# Patient Record
Sex: FEMALE | Race: WHITE | NOT HISPANIC OR LATINO | Employment: OTHER | URBAN - METROPOLITAN AREA
[De-identification: names, ages, dates, MRNs, and addresses within clinical notes are randomized per-mention and may not be internally consistent; named-entity substitution may affect disease eponyms.]

---

## 2017-02-21 RX ORDER — IPRATROPIUM BROMIDE AND ALBUTEROL SULFATE 2.5; .5 MG/3ML; MG/3ML
SOLUTION RESPIRATORY (INHALATION)
Qty: 180 ML | Refills: 0 | Status: SHIPPED | OUTPATIENT
Start: 2017-02-21 | End: 2018-03-20

## 2017-06-01 VITALS
DIASTOLIC BLOOD PRESSURE: 80 MMHG | WEIGHT: 268 LBS | HEART RATE: 98 BPM | BODY MASS INDEX: 45.75 KG/M2 | HEIGHT: 64 IN | OXYGEN SATURATION: 88 % | SYSTOLIC BLOOD PRESSURE: 130 MMHG

## 2017-06-01 RX ORDER — METFORMIN HYDROCHLORIDE 500 MG/1
500 TABLET ORAL
COMMUNITY
End: 2017-08-23 | Stop reason: SDUPTHER

## 2017-06-01 RX ORDER — BUDESONIDE AND FORMOTEROL FUMARATE DIHYDRATE 160; 4.5 UG/1; UG/1
2 AEROSOL RESPIRATORY (INHALATION) EVERY 12 HOURS
COMMUNITY
End: 2017-10-23

## 2017-06-01 RX ORDER — LISINOPRIL 2.5 MG/1
2.5 TABLET ORAL DAILY
COMMUNITY
End: 2017-09-22 | Stop reason: SDUPTHER

## 2017-06-01 RX ORDER — DILTIAZEM HYDROCHLORIDE 120 MG/1
120 CAPSULE, COATED, EXTENDED RELEASE ORAL DAILY
COMMUNITY
End: 2017-10-23 | Stop reason: SDUPTHER

## 2017-06-05 ENCOUNTER — OFFICE VISIT (OUTPATIENT)
Dept: FAMILY MEDICINE | Facility: CLINIC | Age: 67
End: 2017-06-05
Payer: MEDICARE

## 2017-06-05 VITALS
HEIGHT: 65 IN | BODY MASS INDEX: 42.99 KG/M2 | WEIGHT: 258 LBS | HEART RATE: 68 BPM | SYSTOLIC BLOOD PRESSURE: 120 MMHG | DIASTOLIC BLOOD PRESSURE: 68 MMHG | OXYGEN SATURATION: 98 %

## 2017-06-05 DIAGNOSIS — E11.9 TYPE 2 DIABETES MELLITUS WITHOUT COMPLICATION, WITHOUT LONG-TERM CURRENT USE OF INSULIN: ICD-10-CM

## 2017-06-05 DIAGNOSIS — I10 HYPERTENSION, ESSENTIAL: ICD-10-CM

## 2017-06-05 DIAGNOSIS — R73.09 HEMOGLOBIN A1C LESS THAN 7.0%: ICD-10-CM

## 2017-06-05 DIAGNOSIS — M1A.09X0 IDIOPATHIC CHRONIC GOUT OF MULTIPLE SITES WITHOUT TOPHUS: ICD-10-CM

## 2017-06-05 PROCEDURE — 99213 OFFICE O/P EST LOW 20 MIN: CPT | Mod: ,,, | Performed by: FAMILY MEDICINE

## 2017-06-05 RX ORDER — ALLOPURINOL 100 MG/1
100 TABLET ORAL DAILY
Qty: 90 TABLET | Refills: 1 | Status: SHIPPED | OUTPATIENT
Start: 2017-06-05 | End: 2017-10-23 | Stop reason: SDUPTHER

## 2017-06-05 NOTE — PROGRESS NOTES
Subjective:       Patient ID: Johanna Mancini is a 67 y.o. female.    Chief Complaint: Hypertension (rx refills) and Diabetes    Hypertension   This is a chronic problem. The current episode started more than 1 year ago. The problem is unchanged. The problem is controlled. Pertinent negatives include no anxiety, blurred vision, chest pain, headaches or shortness of breath. Risk factors for coronary artery disease include dyslipidemia, obesity, post-menopausal state and sedentary lifestyle. Past treatments include ACE inhibitors. There are no compliance problems.    Diabetes   Pertinent negatives for hypoglycemia include no dizziness or headaches. Pertinent negatives for diabetes include no blurred vision, no chest pain, no fatigue and no weakness.     Review of Systems   Constitutional: Negative for fatigue, fever and unexpected weight change.   HENT: Negative for congestion, hearing loss and sore throat.    Eyes: Negative for blurred vision and visual disturbance.   Respiratory: Negative for cough, chest tightness and shortness of breath.    Cardiovascular: Negative for chest pain and leg swelling.   Gastrointestinal: Negative for abdominal pain, constipation, diarrhea, nausea and vomiting.   Genitourinary: Negative for difficulty urinating.   Musculoskeletal: Negative for back pain.   Neurological: Negative for dizziness, weakness and headaches.   Hematological: Negative for adenopathy.   Psychiatric/Behavioral: Negative for suicidal ideas.       Past Medical History:   Diagnosis Date    Articular gout     COPD (chronic obstructive pulmonary disease)     Hypertension     Osteopetrosis     Sleep apnea       Past Surgical History:   Procedure Laterality Date    CHOLECYSTECTOMY      HYSTERECTOMY      MINERVA equivalent       Family History   Problem Relation Age of Onset    Heart disease Mother     Cancer Father     Cancer Sister      breast       Social History     Social History    Marital status:       Spouse name: N/A    Number of children: N/A    Years of education: N/A     Social History Main Topics    Smoking status: Former Smoker    Smokeless tobacco: None    Alcohol use No    Drug use: No    Sexual activity: Not Asked     Other Topics Concern    None     Social History Narrative    .  Three children. Former smoker, quit 15 yr ago.  Retired , cook and kitchen work.        Current Outpatient Prescriptions   Medication Sig Dispense Refill    albuterol-ipratropium 2.5mg-0.5mg/3mL (DUO-NEB) 0.5 mg-3 mg(2.5 mg base)/3 mL nebulizer solution 1 VIAL THRU NEBULZIER EVERY 6 TO 8 HOURS AS NEEDED 180 mL 0    allopurinol (ZYLOPRIM) 100 MG tablet Take 1 tablet (100 mg total) by mouth once daily. 90 tablet 1    budesonide-formoterol 160-4.5 mcg (SYMBICORT) 160-4.5 mcg/actuation HFAA Inhale 2 puffs into the lungs every 12 (twelve) hours. Controller      cloNIDine (CATAPRES) 0.1 MG tablet TAKE 1 TABLET BY MOUTH TWICE DAILY 60 tablet 5    diltiaZEM (CARDIZEM CD) 120 MG Cp24 Take 120 mg by mouth once daily.      hydrochlorothiazide (HYDRODIURIL) 25 MG tablet TAKE 1 TABLET BY MOUTH DAILY 30 tablet 5    metformin (GLUCOPHAGE) 500 MG tablet Take 500 mg by mouth daily with breakfast.      theophylline (THEODUR) 300 MG 12 hr tablet TAKE 1 TABLET BY MOUTH TWICE DAILY 60 tablet 5    atenolol (TENORMIN) 25 MG tablet TAKE ONE TABLET BY MOUTH EVERY DAY 90 tablet 3    ibuprofen (ADVIL,MOTRIN) 200 MG tablet Take 200 mg by mouth every 6 (six) hours as needed.      lisinopril (PRINIVIL,ZESTRIL) 2.5 MG tablet Take 2.5 mg by mouth once daily.      tramadol (ULTRAM) 50 mg tablet Take 50 mg by mouth every 6 (six) hours as needed.       No current facility-administered medications for this visit.        Review of patient's allergies indicates:   Allergen Reactions    Tylenol [acetaminophen] Anaphylaxis    Levofloxacin Itching     Objective:    HPI     Hypertension    Additional comments: rx refills       Last  "edited by Miguel Ángel Doty MA on 6/5/2017  1:36 PM. (History)      Blood pressure 120/68, pulse 68, height 5' 5" (1.651 m), weight 117 kg (258 lb), SpO2 98 %. Body mass index is 42.93 kg/m².   Physical Exam   Constitutional: Vital signs are normal. She appears well-developed and well-nourished. She is cooperative. No distress.   HENT:   Head: Normocephalic and atraumatic.   Right Ear: Tympanic membrane normal.   Left Ear: Tympanic membrane normal.   Eyes: Conjunctivae, EOM and lids are normal. Lids are everted and swept, no foreign bodies found. Right pupil is round and reactive. Left pupil is round and reactive.   Neck: Trachea normal. Neck supple.   Cardiovascular: Regular rhythm, S1 normal and S2 normal.    Pulmonary/Chest: Breath sounds normal. No respiratory distress. She has no wheezes. She has no rales.   Abdominal: Soft. Bowel sounds are normal. She exhibits no mass. There is no tenderness. There is no rigidity and no guarding.   Musculoskeletal: Normal range of motion.   Lymphadenopathy:     She has no cervical adenopathy.     She has no axillary adenopathy.   Neurological: She is alert.   Skin: Skin is warm and dry.   Psychiatric: She has a normal mood and affect. Her behavior is normal. Judgment and thought content normal.           Assessment:       1. Hypertension, essential    2. Type 2 diabetes mellitus without complication, without long-term current use of insulin    3. Hemoglobin A1c less than 7.0%    4. Idiopathic chronic gout of multiple sites without tophus        Plan:       Johanna was seen today for hypertension and diabetes.    Diagnoses and all orders for this visit:    Hypertension, essential    Type 2 diabetes mellitus without complication, without long-term current use of insulin    Hemoglobin A1c less than 7.0%    Idiopathic chronic gout of multiple sites without tophus  -     allopurinol (ZYLOPRIM) 100 MG tablet; Take 1 tablet (100 mg total) by mouth once daily.         "

## 2017-06-05 NOTE — PATIENT INSTRUCTIONS
Diabetes and Heart Disease     Take your medicines as directed each day, even if you feel fine.   If you have diabetes, you are two to four times more likely to have heart disease than someone without diabetes. This higher risk is due to diabetes, but it is also due to other risk factors for heart disease that happen in people with diabetes. But theres good news. You can help control your health risks by making some changes in your life. You can take steps to reduce your risk of heart disease by half--similar to the risk in people who don't have diabetes.  Your main risk factors  Three major risk factors for heart disease are high blood sugar, high blood pressure, and high levels of lipids. By keeping risk factors under control, you can help keep your heart and arteries healthy. This may reduce your chances of a heart attack.  · Blood sugar. High blood sugar can make artery walls tough and rough. Plaque (waxy material in the blood) can then build up along the artery walls, making it harder for blood to flow through the arteries. Having high blood sugar increases the chances of having high blood pressure and high cholesterol.  · Blood pressure. When blood pressure is high all the time it causes your heart to work harder to pump blood. Artery walls become damaged. This increases the risk for plaque build up.  · Lipids. The body needs some lipids in the blood to stay healthy. But lipid levels that are too high can damage the artery walls. Lipids include cholesterol and triglycerides. There are two kinds of cholesterol. LDL (bad) cholesterol can damage the arteries. But HDL (good) cholesterol helps clear LDL cholesterol from the blood vessels. This helps keep the arteries healthy. When blood sugar is high, the level of triglycerides in the blood may also be high. High blood triglyceride levels can cause plaque to form.   Other risk factors  Certain lifestyle factors can increase levels of your blood sugar, blood  pressure, and lipids. Such increases raise your risk of heart disease:  · Smoking damages the lining of your arteries. This allows plaque to build up in the artery walls. Smoking also constricts (narrows) the arteries. This can raise blood pressure and cause chest pain or angina. Smoking also increases your risk of getting type 2 diabetes.  · Not being active makes it harder for your heart to do its work. Inactivity is linked to many other risk factors, such as high blood pressure and poor cholesterol levels. Inactivity also increases your risk of getting type 2 diabetes.  · Being overweight makes it harder for your body to use insulin. It also makes your heart work too hard. Being overweight is also the main contributor to the development of type 2 diabetes,   Changes you can make  Following a few simple steps can help keep your risk factors under control. Work with your healthcare team to reach your goals.  · Quitting smoking could save your life. Smoking damages the lining of the blood vessels and raises blood pressure. Smoking also affects how your body uses insulin. This makes it harder to keep blood sugar under control. If you smoke and need help quitting, talk to your healthcare team.   · Testing your blood sugar is the only way to know whether it is under control. Be sure to test your blood sugar yourself. Also get your blood tested in the lab, as directed.  · Monitoring your blood pressure and lipid levels can help you achieve safe levels. Visit your healthcare team as scheduled.  · Taking medicines as directed can help control blood sugar, blood pressure, blood clotting, and/or cholesterol levels.  · Eating right can reduce your risk factors and help you lose weight. Try to limit the amount of processed or refined carbohydrates you eat at one time. Cut back on your total calorie intake. Eat foods low in saturated fat and cholesterol. Eat fiber, including vegetables and whole grains, and cut down on salt. A  dietitian or diabetes educator can help form a meal plan that works for you--even if you are on a low budget.   · Being active can help reduce your weight, strengthen your heart, and lower your lipid levels and blood pressure. Exercise and activity are good for your whole body. Talk to your healthcare team about increasing your activity safely over time.  · Keeping your appointments with your healthcare provider helps you stay healthy. Go in for checkups and lab tests as scheduled.  Date Last Reviewed: 5/19/2016  © 0981-5013 [employer]. [address] [place] [address], [place] [address]. All rights reserved. This information is not intended as a substitute for professional medical care. Always follow your healthcare professional's instructions.

## 2017-08-23 DIAGNOSIS — E11.9 TYPE 2 DIABETES MELLITUS WITHOUT COMPLICATION, WITH LONG-TERM CURRENT USE OF INSULIN: Primary | ICD-10-CM

## 2017-08-23 DIAGNOSIS — Z79.4 TYPE 2 DIABETES MELLITUS WITHOUT COMPLICATION, WITH LONG-TERM CURRENT USE OF INSULIN: Primary | ICD-10-CM

## 2017-08-24 RX ORDER — METFORMIN HYDROCHLORIDE 500 MG/1
500 TABLET ORAL
Qty: 30 TABLET | Refills: 2 | Status: SHIPPED | OUTPATIENT
Start: 2017-08-24 | End: 2017-09-22 | Stop reason: SDUPTHER

## 2017-09-20 ENCOUNTER — TELEPHONE (OUTPATIENT)
Dept: FAMILY MEDICINE | Facility: CLINIC | Age: 67
End: 2017-09-20

## 2017-09-20 DIAGNOSIS — Z79.4 TYPE 2 DIABETES MELLITUS WITHOUT COMPLICATION, WITH LONG-TERM CURRENT USE OF INSULIN: Primary | ICD-10-CM

## 2017-09-20 DIAGNOSIS — E11.9 TYPE 2 DIABETES MELLITUS WITHOUT COMPLICATION, WITH LONG-TERM CURRENT USE OF INSULIN: Primary | ICD-10-CM

## 2017-09-20 NOTE — TELEPHONE ENCOUNTER
----- Message from Neelima Meneses sent at 9/19/2017  2:47 PM CDT -----  Patient needs diabetic nephropathy screening.

## 2017-09-22 DIAGNOSIS — Z79.4 TYPE 2 DIABETES MELLITUS WITHOUT COMPLICATION, WITH LONG-TERM CURRENT USE OF INSULIN: ICD-10-CM

## 2017-09-22 DIAGNOSIS — E11.9 TYPE 2 DIABETES MELLITUS WITHOUT COMPLICATION, WITH LONG-TERM CURRENT USE OF INSULIN: ICD-10-CM

## 2017-09-22 RX ORDER — LISINOPRIL 2.5 MG/1
2.5 TABLET ORAL DAILY
Qty: 30 TABLET | Refills: 1 | Status: SHIPPED | OUTPATIENT
Start: 2017-09-22 | End: 2017-10-23 | Stop reason: SDUPTHER

## 2017-09-22 RX ORDER — METFORMIN HYDROCHLORIDE 500 MG/1
500 TABLET ORAL
Qty: 30 TABLET | Refills: 1 | Status: SHIPPED | OUTPATIENT
Start: 2017-09-22 | End: 2017-10-23 | Stop reason: SDUPTHER

## 2017-10-16 LAB
ALBUMIN SERPL-MCNC: 3.8 G/DL (ref 3.1–4.7)
ALP SERPL-CCNC: 70 IU/L (ref 40–104)
ALT (SGPT): 16 IU/L (ref 3–33)
AST SERPL-CCNC: 19 IU/L (ref 10–40)
BILIRUB SERPL-MCNC: 0.7 MG/DL (ref 0.3–1)
BUN SERPL-MCNC: 15 MG/DL (ref 8–20)
CALCIUM SERPL-MCNC: 9.3 MG/DL (ref 7.7–10.4)
CHLORIDE: 98 MMOL/L (ref 98–110)
CO2 SERPL-SCNC: 33.5 MMOL/L (ref 22.8–31.6)
CREATININE RANDOM URINE: 86 MG/DL
CREATININE: 0.65 MG/DL (ref 0.6–1.4)
GLUCOSE: 111 MG/DL (ref 70–99)
MICROALBUM.,U,RANDOM: 2.4 MCG/ML (ref 0–19.9)
MICROALBUMIN/CREATININE RATIO: 3 (ref 0–30)
POTASSIUM SERPL-SCNC: 4.3 MMOL/L (ref 3.5–5)
PROT SERPL-MCNC: 6.9 G/DL (ref 6–8.2)
SODIUM: 142 MMOL/L (ref 134–144)

## 2017-10-17 LAB — HBA1C MFR BLD: 6.2 % (ref 3.1–6.5)

## 2017-10-23 ENCOUNTER — OFFICE VISIT (OUTPATIENT)
Dept: FAMILY MEDICINE | Facility: CLINIC | Age: 67
End: 2017-10-23
Payer: MEDICARE

## 2017-10-23 VITALS
HEIGHT: 65 IN | SYSTOLIC BLOOD PRESSURE: 110 MMHG | OXYGEN SATURATION: 91 % | BODY MASS INDEX: 43.65 KG/M2 | DIASTOLIC BLOOD PRESSURE: 60 MMHG | HEART RATE: 94 BPM | WEIGHT: 262 LBS

## 2017-10-23 DIAGNOSIS — E11.9 TYPE 2 DIABETES MELLITUS WITHOUT COMPLICATION, WITHOUT LONG-TERM CURRENT USE OF INSULIN: Primary | ICD-10-CM

## 2017-10-23 DIAGNOSIS — M1A.09X0 IDIOPATHIC CHRONIC GOUT OF MULTIPLE SITES WITHOUT TOPHUS: ICD-10-CM

## 2017-10-23 DIAGNOSIS — Z79.4 TYPE 2 DIABETES MELLITUS WITHOUT COMPLICATION, WITH LONG-TERM CURRENT USE OF INSULIN: ICD-10-CM

## 2017-10-23 DIAGNOSIS — J43.1 PANLOBULAR EMPHYSEMA: ICD-10-CM

## 2017-10-23 DIAGNOSIS — M10.09 IDIOPATHIC GOUT OF MULTIPLE SITES, UNSPECIFIED CHRONICITY: ICD-10-CM

## 2017-10-23 DIAGNOSIS — I10 HYPERTENSION, ESSENTIAL: ICD-10-CM

## 2017-10-23 DIAGNOSIS — Z23 NEED FOR INFLUENZA VACCINATION: ICD-10-CM

## 2017-10-23 DIAGNOSIS — E11.9 TYPE 2 DIABETES MELLITUS WITHOUT COMPLICATION, WITH LONG-TERM CURRENT USE OF INSULIN: ICD-10-CM

## 2017-10-23 PROCEDURE — G0008 ADMIN INFLUENZA VIRUS VAC: HCPCS | Mod: ,,, | Performed by: FAMILY MEDICINE

## 2017-10-23 PROCEDURE — 99214 OFFICE O/P EST MOD 30 MIN: CPT | Mod: ,,, | Performed by: FAMILY MEDICINE

## 2017-10-23 PROCEDURE — 90662 IIV NO PRSV INCREASED AG IM: CPT | Mod: ,,, | Performed by: FAMILY MEDICINE

## 2017-10-23 RX ORDER — ALLOPURINOL 100 MG/1
100 TABLET ORAL DAILY
Qty: 90 TABLET | Refills: 1 | Status: SHIPPED | OUTPATIENT
Start: 2017-10-23 | End: 2018-03-20 | Stop reason: SDUPTHER

## 2017-10-23 RX ORDER — IBUPROFEN 200 MG
200 TABLET ORAL EVERY 6 HOURS PRN
COMMUNITY
Start: 2017-10-23

## 2017-10-23 RX ORDER — HYDROCHLOROTHIAZIDE 25 MG/1
25 TABLET ORAL DAILY
Qty: 30 TABLET | Refills: 5 | Status: SHIPPED | OUTPATIENT
Start: 2017-10-23 | End: 2018-03-20

## 2017-10-23 RX ORDER — DILTIAZEM HYDROCHLORIDE 120 MG/1
120 CAPSULE, COATED, EXTENDED RELEASE ORAL DAILY
Qty: 30 CAPSULE | Refills: 6 | Status: SHIPPED | OUTPATIENT
Start: 2017-10-23 | End: 2018-03-20

## 2017-10-23 RX ORDER — CLONIDINE HYDROCHLORIDE 0.1 MG/1
0.1 TABLET ORAL 2 TIMES DAILY
Qty: 60 TABLET | Refills: 5 | Status: SHIPPED | OUTPATIENT
Start: 2017-10-23 | End: 2018-03-20

## 2017-10-23 RX ORDER — METFORMIN HYDROCHLORIDE 500 MG/1
500 TABLET ORAL
Qty: 30 TABLET | Refills: 6 | Status: SHIPPED | OUTPATIENT
Start: 2017-10-23 | End: 2018-03-20

## 2017-10-23 RX ORDER — THEOPHYLLINE 300 MG/1
300 TABLET, EXTENDED RELEASE ORAL 2 TIMES DAILY
Qty: 60 TABLET | Refills: 5 | Status: SHIPPED | OUTPATIENT
Start: 2017-10-23 | End: 2018-03-20

## 2017-10-23 RX ORDER — LISINOPRIL 2.5 MG/1
2.5 TABLET ORAL DAILY
Qty: 30 TABLET | Refills: 1 | Status: SHIPPED | OUTPATIENT
Start: 2017-10-23 | End: 2017-12-26 | Stop reason: SDUPTHER

## 2017-10-23 NOTE — PATIENT INSTRUCTIONS
Chronic Lung Disease: Managing Sleep Problems  Chronic lung diseases include chronic obstructive pulmonary disease (COPD), most often chronic bronchitis and emphysema. They also include pulmonary fibrosis or sarcoidosis. If you have a chronic lung disease, you may have trouble sleeping. You may wake up often at night. Or you may not feel rested in the morning. There are many reasons you may not be getting a good nights sleep. Lung disease can make it harder to breathe at night. Age, certain medicines, and not getting enough activity during the day can also affect sleep.    Sleeping better  If youre having trouble sleeping, try the following:  · Use a breathing technique. Taking slow, deep breaths can help you relax and fall asleep.  ¨ Ask your healthcare provider to show you how to do pursed-lip and diaphragmatic breathing in bed. Both of these breathing methods are good for people with lung disease.  · Dont have drinks with caffeine in the afternoon or evening.   · Try to go to sleep and wake up at around the same time every day. This helps your body get into a pattern.  · Avoid long naps during the day. This can make it harder to sleep at night. A very brief nap should be OK.  · Make sure your bed and bedroom are comfortable for you. This includes temperature, light, and noise level.  · It may be best not to watch television or use your computer or phone in bed.  · Talk to your healthcare provider about  any medicines you take at bedtime, they may be keeping you awake. You may be able to take the medicine at another time of day.  CPAP and BiPAP  To help with breathing at night, your healthcare provider may prescribe CPAP or BiPAP. You may be given a CPAP (continuous positive airway pressure) device. Or you may be given a BiPAP (bilevel positive airway pressure) device. These devices send a gentle flow of air through a nasal mask while you sleep. This air goes through your nose and into your lungs, keeping  your airways open. Below are tips for using these devices:  · Give yourself time to adjust to the device. It may take a while. You can ask your provider or someone from the medical supply company for suggestions to make it more comfortable.  · If your mask doesnt fit or feel right, talk to your provider or the medical supply company representative about adjusting it. Or you may try a different mask. Custom-made masks are also available.  · These devices work best if your nose is clear. If you have allergies or other problems that block your nose, talk to your provider.  Date Last Reviewed: 5/1/2016  © 6994-2431 [employer]. [address] [place] [address], [place] [address]. All rights reserved. This information is not intended as a substitute for professional medical care. Always follow your healthcare professional's instructions.

## 2017-10-23 NOTE — PROGRESS NOTES
Subjective:       Patient ID: Johanna Mancini is a 67 y.o. female.    Chief Complaint: Diabetes (discuss lab results) and Medication Refill (b/p, diabetes, )    Diabetes   She presents for her follow-up diabetic visit. She has type 2 diabetes mellitus. No MedicAlert identification noted. Her disease course has been improving. Pertinent negatives for hypoglycemia include no confusion, dizziness or headaches. Pertinent negatives for diabetes include no blurred vision, no chest pain, no fatigue, no foot paresthesias, no foot ulcerations, no polydipsia, no polyphagia and no weakness. Symptoms are stable.   Medication Refill   Pertinent negatives include no abdominal pain, arthralgias, chest pain, chills, congestion, coughing, diaphoresis, fatigue, fever, headaches, myalgias, nausea, rash, sore throat, vomiting or weakness.     Review of Systems   Constitutional: Negative for appetite change, chills, diaphoresis, fatigue and fever.   HENT: Negative for congestion, ear pain, hearing loss, sore throat and trouble swallowing.    Eyes: Negative for blurred vision, photophobia, pain and visual disturbance.   Respiratory: Positive for shortness of breath (sees monique). Negative for cough and chest tightness.    Cardiovascular: Negative for chest pain, palpitations and leg swelling.   Gastrointestinal: Negative for abdominal pain, blood in stool, constipation, diarrhea, nausea and vomiting.   Endocrine: Negative for cold intolerance, heat intolerance, polydipsia and polyphagia.   Genitourinary: Negative for difficulty urinating, flank pain, pelvic pain and vaginal pain.   Musculoskeletal: Negative for arthralgias and myalgias.   Skin: Negative for rash.   Allergic/Immunologic: Negative for immunocompromised state.   Neurological: Negative for dizziness, weakness, light-headedness and headaches.   Hematological: Negative for adenopathy. Does not bruise/bleed easily.   Psychiatric/Behavioral: Negative for confusion, self-injury  and suicidal ideas.       Past Medical History:   Diagnosis Date    Articular gout     CHF (congestive heart failure)     COPD (chronic obstructive pulmonary disease)     Diabetes mellitus, type 2     Hypertension     Osteopetrosis     Sleep apnea       Past Surgical History:   Procedure Laterality Date    CHOLECYSTECTOMY      HYSTERECTOMY      MINERVA equivalent       Family History   Problem Relation Age of Onset    Heart disease Mother     Cancer Father     Cancer Sister      breast       Social History     Social History    Marital status:      Spouse name: N/A    Number of children: N/A    Years of education: N/A     Social History Main Topics    Smoking status: Former Smoker    Smokeless tobacco: Never Used    Alcohol use No    Drug use: No    Sexual activity: Not Asked     Other Topics Concern    None     Social History Narrative    .  Three children. Former smoker, quit 15 yr ago.  Retired , cook and kitchen work.        Current Outpatient Prescriptions   Medication Sig Dispense Refill    albuterol-ipratropium 2.5mg-0.5mg/3mL (DUO-NEB) 0.5 mg-3 mg(2.5 mg base)/3 mL nebulizer solution 1 VIAL THRU NEBULZIER EVERY 6 TO 8 HOURS AS NEEDED 180 mL 0    allopurinol (ZYLOPRIM) 100 MG tablet Take 1 tablet (100 mg total) by mouth once daily. 90 tablet 1    cloNIDine (CATAPRES) 0.1 MG tablet Take 1 tablet (0.1 mg total) by mouth 2 (two) times daily. 60 tablet 5    diltiaZEM (CARDIZEM CD) 120 MG Cp24 Take 1 capsule (120 mg total) by mouth once daily. 30 capsule 6    hydrochlorothiazide (HYDRODIURIL) 25 MG tablet Take 1 tablet (25 mg total) by mouth once daily. 30 tablet 5    ibuprofen (ADVIL,MOTRIN) 200 MG tablet Take 1 tablet (200 mg total) by mouth every 6 (six) hours as needed.      lisinopril (PRINIVIL,ZESTRIL) 2.5 MG tablet Take 1 tablet (2.5 mg total) by mouth once daily. 30 tablet 1    metformin (GLUCOPHAGE) 500 MG tablet Take 1 tablet (500 mg total) by mouth daily with  "breakfast. 30 tablet 6    theophylline (THEODUR) 300 MG 12 hr tablet Take 1 tablet (300 mg total) by mouth 2 (two) times daily. 60 tablet 5     No current facility-administered medications for this visit.        Review of patient's allergies indicates:   Allergen Reactions    Tylenol [acetaminophen] Anaphylaxis    Levofloxacin Itching     Objective:    HPI     Diabetes    Additional comments: discuss lab results           Medication Refill    Additional comments: b/p, diabetes,        Last edited by Breanne Welsh MA on 10/23/2017  9:39 AM. (History)      Blood pressure 110/60, pulse 94, height 5' 5" (1.651 m), weight 118.8 kg (262 lb), SpO2 (!) 91 %. Body mass index is 43.6 kg/m².   Physical Exam   Constitutional: She is oriented to person, place, and time. She appears well-developed and well-nourished. She is cooperative. No distress.   obese   HENT:   Head: Normocephalic and atraumatic.   Right Ear: Tympanic membrane normal.   Left Ear: Tympanic membrane normal.   Eyes: Conjunctivae, EOM and lids are normal. Pupils are equal, round, and reactive to light. Lids are everted and swept, no foreign bodies found. Right pupil is round and reactive. Left pupil is round and reactive.   Neck: Trachea normal and normal range of motion. Neck supple.   Cardiovascular: Normal rate, regular rhythm, S1 normal, S2 normal, normal heart sounds and intact distal pulses.    Pulmonary/Chest: No respiratory distress. She has no wheezes. She has rales (mild, using o2).   Abdominal: Soft. Bowel sounds are normal. There is no rigidity and no guarding.   Musculoskeletal: Normal range of motion.   Lymphadenopathy:     She has no cervical adenopathy.     She has no axillary adenopathy.   Neurological: She is alert and oriented to person, place, and time.   Skin: Skin is warm and dry. Capillary refill takes less than 2 seconds.   Psychiatric: She has a normal mood and affect. Her behavior is normal. Judgment and thought content normal. "   Nursing note and vitals reviewed.          Assessment:       1. Type 2 diabetes mellitus without complication, without long-term current use of insulin    2. Hypertension, essential    3. Panlobular emphysema    4. Idiopathic gout of multiple sites, unspecified chronicity    5. Need for influenza vaccination    6. Idiopathic chronic gout of multiple sites without tophus    7. Type 2 diabetes mellitus without complication, with long-term current use of insulin        Plan:       Johanna was seen today for diabetes and medication refill.    Diagnoses and all orders for this visit:    Type 2 diabetes mellitus without complication, without long-term current use of insulin  -     metformin (GLUCOPHAGE) 500 MG tablet; Take 1 tablet (500 mg total) by mouth daily with breakfast.    Hypertension, essential  -     cloNIDine (CATAPRES) 0.1 MG tablet; Take 1 tablet (0.1 mg total) by mouth 2 (two) times daily.  -     diltiaZEM (CARDIZEM CD) 120 MG Cp24; Take 1 capsule (120 mg total) by mouth once daily.  -     hydrochlorothiazide (HYDRODIURIL) 25 MG tablet; Take 1 tablet (25 mg total) by mouth once daily.  -     lisinopril (PRINIVIL,ZESTRIL) 2.5 MG tablet; Take 1 tablet (2.5 mg total) by mouth once daily.    Panlobular emphysema  -     theophylline (THEODUR) 300 MG 12 hr tablet; Take 1 tablet (300 mg total) by mouth 2 (two) times daily.    Idiopathic gout of multiple sites, unspecified chronicity    Need for influenza vaccination  -     Influenza - High Dose (65+) (PF) (IM)    Idiopathic chronic gout of multiple sites without tophus  -     allopurinol (ZYLOPRIM) 100 MG tablet; Take 1 tablet (100 mg total) by mouth once daily.  -     ibuprofen (ADVIL,MOTRIN) 200 MG tablet; Take 1 tablet (200 mg total) by mouth every 6 (six) hours as needed.    Type 2 diabetes mellitus without complication, with long-term current use of insulin  -     lisinopril (PRINIVIL,ZESTRIL) 2.5 MG tablet; Take 1 tablet (2.5 mg total) by mouth once  daily.  -     metformin (GLUCOPHAGE) 500 MG tablet; Take 1 tablet (500 mg total) by mouth daily with breakfast.

## 2017-12-11 ENCOUNTER — TELEPHONE (OUTPATIENT)
Dept: INTERNAL MEDICINE | Facility: CLINIC | Age: 67
End: 2017-12-11

## 2017-12-11 NOTE — TELEPHONE ENCOUNTER
----- Message from Neelima Meneses sent at 12/6/2017  3:17 PM CST -----  It's reported the patient had a mammo last year I believe. Can you find out where and get result? I need this before year end to send to [facility]. Thanks

## 2017-12-26 DIAGNOSIS — I10 HYPERTENSION, ESSENTIAL: ICD-10-CM

## 2017-12-26 DIAGNOSIS — Z79.4 TYPE 2 DIABETES MELLITUS WITHOUT COMPLICATION, WITH LONG-TERM CURRENT USE OF INSULIN: ICD-10-CM

## 2017-12-26 DIAGNOSIS — E11.9 TYPE 2 DIABETES MELLITUS WITHOUT COMPLICATION, WITH LONG-TERM CURRENT USE OF INSULIN: ICD-10-CM

## 2017-12-27 RX ORDER — LISINOPRIL 2.5 MG/1
TABLET ORAL
Qty: 30 TABLET | Refills: 0 | Status: SHIPPED | OUTPATIENT
Start: 2017-12-27 | End: 2018-03-20

## 2018-01-24 DIAGNOSIS — I10 HYPERTENSION, ESSENTIAL: ICD-10-CM

## 2018-01-24 DIAGNOSIS — Z79.4 TYPE 2 DIABETES MELLITUS WITHOUT COMPLICATION, WITH LONG-TERM CURRENT USE OF INSULIN: ICD-10-CM

## 2018-01-24 DIAGNOSIS — E11.9 TYPE 2 DIABETES MELLITUS WITHOUT COMPLICATION, WITH LONG-TERM CURRENT USE OF INSULIN: ICD-10-CM

## 2018-01-24 RX ORDER — LISINOPRIL 2.5 MG/1
TABLET ORAL
Qty: 30 TABLET | Refills: 0 | Status: SHIPPED | OUTPATIENT
Start: 2018-01-24 | End: 2018-02-21 | Stop reason: SDUPTHER

## 2018-01-25 ENCOUNTER — HOSPITAL ENCOUNTER (EMERGENCY)
Facility: HOSPITAL | Age: 68
Discharge: HOME | End: 2018-01-25
Attending: EMERGENCY MEDICINE
Payer: MEDICARE

## 2018-01-25 VITALS
SYSTOLIC BLOOD PRESSURE: 105 MMHG | HEIGHT: 61 IN | HEART RATE: 102 BPM | OXYGEN SATURATION: 95 % | TEMPERATURE: 98 F | DIASTOLIC BLOOD PRESSURE: 58 MMHG | WEIGHT: 260 LBS | RESPIRATION RATE: 21 BRPM | BODY MASS INDEX: 49.09 KG/M2

## 2018-01-25 DIAGNOSIS — R05.9 COUGH: ICD-10-CM

## 2018-01-25 DIAGNOSIS — J44.9 CHRONIC OBSTRUCTIVE PULMONARY DISEASE, UNSPECIFIED COPD TYPE: Primary | ICD-10-CM

## 2018-01-25 DIAGNOSIS — R06.02 SOB (SHORTNESS OF BREATH): ICD-10-CM

## 2018-01-25 LAB
ALBUMIN SERPL BCP-MCNC: 3.8 G/DL
ALP SERPL-CCNC: 93 U/L
ALT SERPL W/O P-5'-P-CCNC: 18 U/L
ANION GAP SERPL CALC-SCNC: 14 MMOL/L
AST SERPL-CCNC: 19 U/L
BASOPHILS NFR BLD: 0 %
BILIRUB SERPL-MCNC: 0.2 MG/DL
BNP SERPL-MCNC: 27 PG/ML
BUN SERPL-MCNC: 18 MG/DL
CALCIUM SERPL-MCNC: 9.9 MG/DL
CHLORIDE SERPL-SCNC: 97 MMOL/L
CO2 SERPL-SCNC: 30 MMOL/L
CREAT SERPL-MCNC: 0.8 MG/DL
DIFFERENTIAL METHOD: ABNORMAL
EOSINOPHIL NFR BLD: 3 %
ERYTHROCYTE [DISTWIDTH] IN BLOOD BY AUTOMATED COUNT: 13.7 %
EST. GFR  (AFRICAN AMERICAN): >60 ML/MIN/1.73 M^2
EST. GFR  (NON AFRICAN AMERICAN): >60 ML/MIN/1.73 M^2
FLUAV AG SPEC QL IA: NEGATIVE
FLUBV AG SPEC QL IA: NEGATIVE
GLUCOSE SERPL-MCNC: 103 MG/DL
HCT VFR BLD AUTO: 43.3 %
HGB BLD-MCNC: 13.7 G/DL
LYMPHOCYTES NFR BLD: 40 %
MCH RBC QN AUTO: 28.2 PG
MCHC RBC AUTO-ENTMCNC: 31.6 G/DL
MCV RBC AUTO: 89 FL
MONOCYTES NFR BLD: 7 %
NEUTROPHILS NFR BLD: 44 %
NEUTS BAND NFR BLD MANUAL: 6 %
PLATELET # BLD AUTO: 174 K/UL
PMV BLD AUTO: 12.2 FL
POTASSIUM SERPL-SCNC: 3.8 MMOL/L
PROT SERPL-MCNC: 7.5 G/DL
RBC # BLD AUTO: 4.86 M/UL
SODIUM SERPL-SCNC: 141 MMOL/L
SPECIMEN SOURCE: NORMAL
TROPONIN I SERPL DL<=0.01 NG/ML-MCNC: <0.006 NG/ML
WBC # BLD AUTO: 5.85 K/UL

## 2018-01-25 PROCEDURE — 93010 ELECTROCARDIOGRAM REPORT: CPT | Mod: ,,, | Performed by: INTERNAL MEDICINE

## 2018-01-25 PROCEDURE — 83880 ASSAY OF NATRIURETIC PEPTIDE: CPT

## 2018-01-25 PROCEDURE — 85025 COMPLETE CBC W/AUTO DIFF WBC: CPT

## 2018-01-25 PROCEDURE — 80053 COMPREHEN METABOLIC PANEL: CPT

## 2018-01-25 PROCEDURE — 94640 AIRWAY INHALATION TREATMENT: CPT

## 2018-01-25 PROCEDURE — 63600175 PHARM REV CODE 636 W HCPCS: Performed by: EMERGENCY MEDICINE

## 2018-01-25 PROCEDURE — 27000221 HC OXYGEN, UP TO 24 HOURS

## 2018-01-25 PROCEDURE — 87400 INFLUENZA A/B EACH AG IA: CPT

## 2018-01-25 PROCEDURE — 99285 EMERGENCY DEPT VISIT HI MDM: CPT | Mod: 25

## 2018-01-25 PROCEDURE — 84484 ASSAY OF TROPONIN QUANT: CPT

## 2018-01-25 PROCEDURE — 25000242 PHARM REV CODE 250 ALT 637 W/ HCPCS: Performed by: EMERGENCY MEDICINE

## 2018-01-25 RX ORDER — ALBUTEROL SULFATE 2.5 MG/.5ML
5 SOLUTION RESPIRATORY (INHALATION)
Status: COMPLETED | OUTPATIENT
Start: 2018-01-25 | End: 2018-01-25

## 2018-01-25 RX ORDER — PREDNISONE 50 MG/1
50 TABLET ORAL DAILY
Qty: 4 TABLET | Refills: 0 | Status: SHIPPED | OUTPATIENT
Start: 2018-01-26 | End: 2018-01-30

## 2018-01-25 RX ORDER — OSELTAMIVIR PHOSPHATE 75 MG/1
75 CAPSULE ORAL 2 TIMES DAILY
Qty: 10 CAPSULE | Refills: 0 | Status: SHIPPED | OUTPATIENT
Start: 2018-01-25 | End: 2018-01-30

## 2018-01-25 RX ORDER — PREDNISONE 20 MG/1
60 TABLET ORAL
Status: COMPLETED | OUTPATIENT
Start: 2018-01-25 | End: 2018-01-25

## 2018-01-25 RX ORDER — IPRATROPIUM BROMIDE 0.5 MG/2.5ML
500 SOLUTION RESPIRATORY (INHALATION)
Status: COMPLETED | OUTPATIENT
Start: 2018-01-25 | End: 2018-01-25

## 2018-01-25 RX ADMIN — PREDNISONE 60 MG: 20 TABLET ORAL at 10:01

## 2018-01-25 RX ADMIN — IPRATROPIUM BROMIDE 500 MCG: 0.5 SOLUTION RESPIRATORY (INHALATION) at 09:01

## 2018-01-25 RX ADMIN — ALBUTEROL SULFATE 5 MG: 2.5 SOLUTION RESPIRATORY (INHALATION) at 09:01

## 2018-01-26 NOTE — ED TRIAGE NOTES
"Pt presents to ED with c/o " not feeling good". Pt states that she had a cough for 3 weeks, that she thought was getting better. Pt states that she thiks she has a fever. Reports having pneumonia 2 months ago. Denies chest pain, but reports back pain. Denies N/V, reports neck pain. Will continue to monitor..   "

## 2018-01-26 NOTE — ED PROVIDER NOTES
"Encounter Date: 1/25/2018    SCRIBE #1 NOTE: I, Win Lawson, am scribing for, and in the presence of,  Khoi Nicole III, MD. I have scribed the following portions of the note - Other sections scribed: HPI, ROS.       History     Chief Complaint   Patient presents with    Cough     hx of COPD; "I have a cold" patient on 4 L at home     CC: Cough    HPI: This 68 y.o. Female with PMHx COPD, CHF, DM type II, HTN, gout, osteopetrosis, and PSHx cholecystectomy and hysterectomy presents to the ED c/o rhinorrhea, productive (white sputum) cough, postnasal drip, back ache, fatigue, fever, chills. Pt reports SOB which is alleviated by sitting up from a laying position. Pt also says she feels swollen bumps just inferior to her b/l ears. Pt says she had a "cold with a touch of pneumonia" 2 weeks ago which never completely resolved and is "coming back"; pt says she was treated with prednisone and antibiotics. Pt says she uses 4L oxygen at home and 2 breathing tx's per night. Pt denies change in leg swelling. Pt denies seasonal allergies.      The history is provided by the patient. No  was used.     Review of patient's allergies indicates:   Allergen Reactions    Tylenol [acetaminophen] Anaphylaxis    Levofloxacin Itching     Past Medical History:   Diagnosis Date    Articular gout     CHF (congestive heart failure)     COPD (chronic obstructive pulmonary disease)     Diabetes mellitus, type 2     Hypertension     Osteopetrosis     Sleep apnea      Past Surgical History:   Procedure Laterality Date    CHOLECYSTECTOMY      HYSTERECTOMY      MINERVA equivalent     Family History   Problem Relation Age of Onset    Heart disease Mother     Cancer Father     Cancer Sister      breast     Social History   Substance Use Topics    Smoking status: Former Smoker    Smokeless tobacco: Never Used    Alcohol use No     Review of Systems   Constitutional: Positive for chills, fatigue and fever.        (+) " "b/l "bumps" under ears   HENT: Positive for postnasal drip and rhinorrhea. Negative for sore throat.    Respiratory: Positive for cough and shortness of breath.    Cardiovascular: Negative for chest pain and leg swelling (change).   Gastrointestinal: Negative for nausea.   Genitourinary: Negative for dysuria.   Musculoskeletal: Positive for back pain.   Skin: Negative for rash.   Neurological: Negative for weakness.   Hematological: Does not bruise/bleed easily.       Physical Exam     Initial Vitals [01/25/18 1754]   BP Pulse Resp Temp SpO2   (!) 139/94 (!) 130 (!) 24 97.8 °F (36.6 °C) (!) 86 %      MAP       109         Physical Exam    Nursing note and vitals reviewed.  Constitutional: She appears well-developed and well-nourished. She is not diaphoretic. No distress.   HENT:   Head: Normocephalic and atraumatic.   Nose: Nose normal.   Mouth/Throat: Oropharynx is clear and moist. No oropharyngeal exudate.   Eyes: Conjunctivae and EOM are normal. Pupils are equal, round, and reactive to light. No scleral icterus.   Neck: Normal range of motion. Neck supple. No thyromegaly present. No tracheal deviation present.   Cardiovascular: Normal rate, regular rhythm and normal heart sounds. Exam reveals no gallop and no friction rub.    No murmur heard.  Pulmonary/Chest: No respiratory distress. She has wheezes (diffuse exp, decr air entry b/l, prolonged exp). She has no rhonchi. She has no rales.   Abdominal: Soft. Bowel sounds are normal. She exhibits no distension and no mass. There is no tenderness. There is no rebound and no guarding.   Musculoskeletal: Normal range of motion. She exhibits edema (baseline, nonpitting). She exhibits no tenderness.   Lymphadenopathy:     She has no cervical adenopathy.   Neurological: She is alert and oriented to person, place, and time. She has normal strength. No cranial nerve deficit or sensory deficit.   Skin: Skin is warm and dry. No rash noted. No erythema. No pallor. "   Psychiatric: She has a normal mood and affect. Her behavior is normal. Thought content normal.         ED Course   Procedures  Labs Reviewed   CBC W/ AUTO DIFFERENTIAL   COMPREHENSIVE METABOLIC PANEL   TROPONIN I   B-TYPE NATRIURETIC PEPTIDE   INFLUENZA A AND B ANTIGEN             Medical Decision Making:   Initial Assessment:   68-year-old female history of COPD and CHF presents complaining of cough, shortness of breath, and runny nose been present for over 2 weeks but that have begun to worsen again in the last few days after she was initially treated with prednisone by her primary physician in a couple weeks ago.  Physical examination reveals diffuse secretory wheeze, decreased air entry bilaterally, prolongation of expiration, chronic lower extremity edema, and no other concerning abnormalities.  The patient is on 4 L by nasal cannula with an oxygen saturation 95-96%, and this is her home level of oxygen.  Differential Diagnosis:   COPD exacerbation, pneumonia, CHF exacerbation, noncardiogenic pulmonary edema, pleural effusions, influenza  Independently Interpreted Test(s):   I have ordered and independently interpreted X-rays - see summary below.       <> Summary of X-Ray Reading(s): Chest x-ray: No acute abnormality  ED Management:  Screening eval unremarkable. Pt has good WOB and improvement in BS after treatment in the ED. Patient counseled regarding test results, recommendations for supportive care, and need for follow-up.  Return precautions given.              Scribe Attestation:   Scribe #1: I performed the above scribed service and the documentation accurately describes the services I performed. I attest to the accuracy of the note.    Attending Attestation:           Physician Attestation for Scribe:  Physician Attestation Statement for Scribe #1: I, Khoi Nicole III, MD, reviewed documentation, as scribed by Win Lawson in my presence, and it is both accurate and complete.                 ED Course       Clinical Impression:   The primary encounter diagnosis was Chronic obstructive pulmonary disease, unspecified COPD type. Diagnoses of SOB (shortness of breath) and Cough were also pertinent to this visit.                           Khoi Nicole III, MD  03/21/18 3125

## 2018-01-26 NOTE — DISCHARGE INSTRUCTIONS
Return to the emergency department if you develop worsening shortness of breath, persistent vomiting, fainting, or for any new or worsening medical concerns.  For the next few days, you should use your breathing machine every 6 hours.

## 2018-02-21 DIAGNOSIS — E11.9 TYPE 2 DIABETES MELLITUS WITHOUT COMPLICATION, WITH LONG-TERM CURRENT USE OF INSULIN: ICD-10-CM

## 2018-02-21 DIAGNOSIS — Z79.4 TYPE 2 DIABETES MELLITUS WITHOUT COMPLICATION, WITH LONG-TERM CURRENT USE OF INSULIN: ICD-10-CM

## 2018-02-21 DIAGNOSIS — I10 HYPERTENSION, ESSENTIAL: ICD-10-CM

## 2018-02-21 RX ORDER — LISINOPRIL 2.5 MG/1
TABLET ORAL
Qty: 30 TABLET | Refills: 0 | Status: SHIPPED | OUTPATIENT
Start: 2018-02-21 | End: 2018-03-20

## 2018-03-03 DIAGNOSIS — M1A.09X0 IDIOPATHIC CHRONIC GOUT OF MULTIPLE SITES WITHOUT TOPHUS: ICD-10-CM

## 2018-03-05 RX ORDER — ALLOPURINOL 100 MG/1
TABLET ORAL
Qty: 90 TABLET | Refills: 0 | Status: SHIPPED | OUTPATIENT
Start: 2018-03-05 | End: 2018-03-20

## 2018-03-20 ENCOUNTER — OFFICE VISIT (OUTPATIENT)
Dept: FAMILY MEDICINE | Facility: CLINIC | Age: 68
End: 2018-03-20
Payer: MEDICARE

## 2018-03-20 VITALS
RESPIRATION RATE: 24 BRPM | DIASTOLIC BLOOD PRESSURE: 70 MMHG | WEIGHT: 256.81 LBS | OXYGEN SATURATION: 92 % | HEART RATE: 80 BPM | SYSTOLIC BLOOD PRESSURE: 120 MMHG | BODY MASS INDEX: 48.49 KG/M2 | HEIGHT: 61 IN | TEMPERATURE: 98 F

## 2018-03-20 DIAGNOSIS — E11.9 TYPE 2 DIABETES MELLITUS WITHOUT COMPLICATION, WITHOUT LONG-TERM CURRENT USE OF INSULIN: ICD-10-CM

## 2018-03-20 DIAGNOSIS — J43.1 PANLOBULAR EMPHYSEMA: ICD-10-CM

## 2018-03-20 DIAGNOSIS — Z99.81 HYPOXEMIA REQUIRING SUPPLEMENTAL OXYGEN: ICD-10-CM

## 2018-03-20 DIAGNOSIS — I10 HYPERTENSION, ESSENTIAL: Primary | ICD-10-CM

## 2018-03-20 DIAGNOSIS — Z12.11 COLON CANCER SCREENING: ICD-10-CM

## 2018-03-20 DIAGNOSIS — M81.0 AGE-RELATED OSTEOPOROSIS WITHOUT CURRENT PATHOLOGICAL FRACTURE: ICD-10-CM

## 2018-03-20 DIAGNOSIS — M1A.09X0 IDIOPATHIC CHRONIC GOUT OF MULTIPLE SITES WITHOUT TOPHUS: ICD-10-CM

## 2018-03-20 DIAGNOSIS — I83.813 VARICOSE VEINS OF BOTH LOWER EXTREMITIES WITH PAIN: ICD-10-CM

## 2018-03-20 DIAGNOSIS — Z23 IMMUNIZATION DUE: ICD-10-CM

## 2018-03-20 DIAGNOSIS — R09.02 HYPOXEMIA REQUIRING SUPPLEMENTAL OXYGEN: ICD-10-CM

## 2018-03-20 DIAGNOSIS — R73.09 HEMOGLOBIN A1C LESS THAN 7.0%: ICD-10-CM

## 2018-03-20 PROCEDURE — 99215 OFFICE O/P EST HI 40 MIN: CPT | Mod: S$PBB,,, | Performed by: FAMILY MEDICINE

## 2018-03-20 PROCEDURE — 99213 OFFICE O/P EST LOW 20 MIN: CPT | Mod: PBBFAC,PN | Performed by: FAMILY MEDICINE

## 2018-03-20 PROCEDURE — 99999 PR PBB SHADOW E&M-EST. PATIENT-LVL III: CPT | Mod: PBBFAC,,, | Performed by: FAMILY MEDICINE

## 2018-03-20 RX ORDER — LISINOPRIL 2.5 MG/1
TABLET ORAL
COMMUNITY
Start: 2017-03-27 | End: 2018-03-26 | Stop reason: SDUPTHER

## 2018-03-20 RX ORDER — DILTIAZEM HYDROCHLORIDE 120 MG/1
120 CAPSULE, COATED, EXTENDED RELEASE ORAL DAILY
Qty: 90 CAPSULE | Refills: 1 | Status: SHIPPED | OUTPATIENT
Start: 2018-03-20 | End: 2019-04-08 | Stop reason: SDUPTHER

## 2018-03-20 RX ORDER — IPRATROPIUM BROMIDE 0.5 MG/2.5ML
SOLUTION RESPIRATORY (INHALATION)
COMMUNITY
Start: 2017-01-24

## 2018-03-20 RX ORDER — CLONIDINE HYDROCHLORIDE 0.1 MG/1
0.1 TABLET ORAL 2 TIMES DAILY
Qty: 180 TABLET | Refills: 1 | Status: SHIPPED | OUTPATIENT
Start: 2018-03-20 | End: 2018-09-21 | Stop reason: SDUPTHER

## 2018-03-20 RX ORDER — BUDESONIDE AND FORMOTEROL FUMARATE DIHYDRATE 160; 4.5 UG/1; UG/1
AEROSOL RESPIRATORY (INHALATION)
COMMUNITY
Start: 2016-05-04 | End: 2020-01-01 | Stop reason: ALTCHOICE

## 2018-03-20 RX ORDER — HYDROCHLOROTHIAZIDE 25 MG/1
TABLET ORAL
COMMUNITY
Start: 2017-03-27 | End: 2018-03-20 | Stop reason: SDUPTHER

## 2018-03-20 RX ORDER — DILTIAZEM HYDROCHLORIDE 120 MG/1
CAPSULE, COATED, EXTENDED RELEASE ORAL
COMMUNITY
Start: 2017-03-27 | End: 2018-03-20 | Stop reason: SDUPTHER

## 2018-03-20 RX ORDER — HYDROCHLOROTHIAZIDE 25 MG/1
25 TABLET ORAL DAILY
Qty: 90 TABLET | Refills: 1 | Status: SHIPPED | OUTPATIENT
Start: 2018-03-20 | End: 2018-09-21 | Stop reason: SDUPTHER

## 2018-03-20 RX ORDER — THEOPHYLLINE 300 MG/1
300 TABLET, EXTENDED RELEASE ORAL 2 TIMES DAILY
Qty: 180 TABLET | Refills: 1 | Status: SHIPPED | OUTPATIENT
Start: 2018-03-20 | End: 2019-04-26 | Stop reason: SDUPTHER

## 2018-03-20 RX ORDER — METFORMIN HYDROCHLORIDE 500 MG/1
TABLET ORAL
COMMUNITY
Start: 2017-03-27 | End: 2018-03-20 | Stop reason: SDUPTHER

## 2018-03-20 RX ORDER — ALBUTEROL SULFATE 0.83 MG/ML
SOLUTION RESPIRATORY (INHALATION)
COMMUNITY
End: 2018-07-24 | Stop reason: SDUPTHER

## 2018-03-20 RX ORDER — METFORMIN HYDROCHLORIDE 500 MG/1
500 TABLET ORAL
Qty: 90 TABLET | Refills: 1 | Status: SHIPPED | OUTPATIENT
Start: 2018-03-20 | End: 2018-09-21 | Stop reason: SDUPTHER

## 2018-03-20 RX ORDER — CLONIDINE HYDROCHLORIDE 0.1 MG/1
TABLET ORAL
COMMUNITY
Start: 2017-03-27 | End: 2018-03-20 | Stop reason: SDUPTHER

## 2018-03-20 RX ORDER — ALLOPURINOL 100 MG/1
100 TABLET ORAL DAILY
Qty: 90 TABLET | Refills: 1 | Status: SHIPPED | OUTPATIENT
Start: 2018-03-20 | End: 2018-09-24 | Stop reason: SDUPTHER

## 2018-03-21 NOTE — TELEPHONE ENCOUNTER
----- Message from Amanuel Hill sent at 3/21/2018  1:26 PM CDT -----  Contact: [phone]/PT  Calling to inform doc that need true metrix diabetic machine. Pt calling to inform doc of diabetic machine,to call in strips . [facility]

## 2018-03-22 RX ORDER — LANCETS
EACH MISCELLANEOUS
Qty: 200 EACH | Refills: 0 | Status: SHIPPED | OUTPATIENT
Start: 2018-03-22 | End: 2018-03-27 | Stop reason: SDUPTHER

## 2018-03-22 RX ORDER — DEXTROSE 4 G
TABLET,CHEWABLE ORAL
Qty: 1 EACH | Refills: 0 | Status: SHIPPED | OUTPATIENT
Start: 2018-03-22 | End: 2019-01-01

## 2018-03-22 NOTE — TELEPHONE ENCOUNTER
How Severe Is Your Skin Lesion?: moderate Pt was notified that Dr Mcintosh has filled RX for meter, test strips, and lancets   Has Your Skin Lesion Been Treated?: not been treated Is This A New Presentation, Or A Follow-Up?: Skin Lesions Which Family Member (Optional)?: Father Which Family Member (Optional)?: Grandmother

## 2018-03-23 ENCOUNTER — TELEPHONE (OUTPATIENT)
Dept: FAMILY MEDICINE | Facility: CLINIC | Age: 68
End: 2018-03-23

## 2018-03-23 DIAGNOSIS — E11.9 TYPE 2 DIABETES MELLITUS WITHOUT COMPLICATION, WITHOUT LONG-TERM CURRENT USE OF INSULIN: Primary | ICD-10-CM

## 2018-03-23 NOTE — TELEPHONE ENCOUNTER
----- Message from Aneta Harvey sent at 3/23/2018  9:14 AM CDT -----  Contact: Nora [phone]  Pharmacy is needing clearer directions on the lancets and testing strips Please call  at your earliest convenience.  Thanks !

## 2018-03-24 NOTE — PROGRESS NOTES
Routine Office Visit    Patient Name: Johanna Mancini    : 1950  MRN: [id number]    Subjective:  Johanna is a 68 y.o. female who presents today for:    1. Establish care  Patient presenting today to establish care.  She has been diagnoses with CHF, COPD on continuous O2, type 2 DM, HTN, JANELL, and osteoporosis.  She states that she has been taking all medications as prescribed.  She no longer smokes and states she is taking all medications as prescribed.  She is followed by cardiology for CHF and pulmonary for COPD.  Her DM is maintained with most recent A1c (2017) being 6.2.  She has no new complaints.  She was recently seen in the ED for COPD exacerbation.  She states that she is now feeling better.  She is using her O2 regularly and using all inhalers.  She has no new concerns at this time.  There was no echo in the system for review prior to her visit today.    Past Medical History  Past Medical History:   Diagnosis Date    Articular gout     CHF (congestive heart failure)     COPD (chronic obstructive pulmonary disease)     Diabetes mellitus, type 2     Hypertension     Osteopetrosis     Osteoporosis     Sleep apnea        Past Surgical History  Past Surgical History:   Procedure Laterality Date    CHOLECYSTECTOMY      HYSTERECTOMY      MINERVA equivalent       Family History  Family History   Problem Relation Age of Onset    Heart disease Mother     Cancer Father     Cancer Sister      breast       Social History  Social History     Social History    Marital status:      Spouse name: N/A    Number of children: N/A    Years of education: N/A     Occupational History    Not on file.     Social History Main Topics    Smoking status: Former Smoker    Smokeless tobacco: Never Used    Alcohol use No    Drug use: No    Sexual activity: Not on file     Other Topics Concern    Not on file     Social History Narrative    .  Three children. Former smoker, quit 15 yr ago.  Retired  ", cook and kitchen work.        Current Medications  Current Outpatient Prescriptions on File Prior to Visit   Medication Sig Dispense Refill    ibuprofen (ADVIL,MOTRIN) 200 MG tablet Take 1 tablet (200 mg total) by mouth every 6 (six) hours as needed.       No current facility-administered medications on file prior to visit.        Allergies   Review of patient's allergies indicates:   Allergen Reactions    Tylenol [acetaminophen] Anaphylaxis    Levofloxacin Itching       Review of Systems (Pertinent positives)  Review of Systems   Constitutional: Negative.    HENT: Negative.    Eyes: Negative.    Respiratory: Negative.    Cardiovascular: Negative.    Gastrointestinal: Negative.    Genitourinary: Negative.    Musculoskeletal: Positive for joint pain.   Skin: Negative.    Neurological: Negative.          /70 (BP Location: Left arm, Patient Position: Sitting, BP Method: Medium (Manual))   Pulse 80   Temp 97.6 °F (36.4 °C) (Oral)   Resp (!) 24   Ht 5' 1" (1.549 m)   Wt 116.5 kg (256 lb 13.4 oz)   SpO2 (!) 92%   BMI 48.53 kg/m²     GENERAL APPEARANCE: in no apparent distress and well developed and well nourished  HEENT: PERRL, EOMI, Sclera clear, anicteric, Oropharynx clear, no lesions, Neck supple with midline trachea  NECK: normal, supple, no adenopathy, thyroid normal in size  RESPIRATORY: appears well, vitals normal, no respiratory distress, acyanotic, normal RR, chest clear, no wheezing, crepitations, rhonchi, normal symmetric air entry  HEART: regular rate and rhythm, S1, S2 normal, no murmur, click, rub or gallop.    ABDOMEN: abdomen is soft without tenderness, no masses, no hernias, no organomegaly, no rebound, no guarding. Suprapubic tenderness absent. No CVA tenderness.  NEUROLOGIC: normal without focal findings, CN II-XII are intact.  r  SKIN: no rashes, no wounds, no other lesions  PSYCH: Alert, oriented x 3, thought content appropriate, speech normal, pleasant and cooperative, good " eye contact, well groomed    Assessment/Plan:  Johanna Mancini is a 68 y.o. female who presents today for :    Johanna was seen today for establish care.    Diagnoses and all orders for this visit:    Hypertension, essential  -     diltiaZEM (CARDIZEM CD) 120 MG Cp24; Take 1 capsule (120 mg total) by mouth once daily.  -     hydroCHLOROthiazide (HYDRODIURIL) 25 MG tablet; Take 1 tablet (25 mg total) by mouth once daily.  -     cloNIDine (CATAPRES) 0.1 MG tablet; Take 1 tablet (0.1 mg total) by mouth 2 (two) times daily.    Idiopathic chronic gout of multiple sites without tophus  -     allopurinol (ZYLOPRIM) 100 MG tablet; Take 1 tablet (100 mg total) by mouth once daily.    Type 2 diabetes mellitus without complication, without long-term current use of insulin  -     metFORMIN (GLUCOPHAGE) 500 MG tablet; Take 1 tablet (500 mg total) by mouth daily with breakfast.    Panlobular emphysema  -     theophylline (THEODUR) 300 MG 12 hr tablet; Take 1 tablet (300 mg total) by mouth 2 (two) times daily.    Hypoxemia requiring supplemental oxygen    Varicose veins of both lower extremities with pain    Hemoglobin A1c less than 7.0%    Age-related osteoporosis without current pathological fracture  -     DXA Bone Density Spine And Hip; Future    Colon cancer screening  -     Fecal Immunochemical Test (iFOBT); Future    Immunization due  -     varicella-zoster [id number], PF, (SHINGRIX, PF,) 50 mcg/0.5 mL injection; Inject 0.5 mLs into the muscle once.      1.  Medications refilled  2.  Patient agrees to [facility] for colon cancer screening  3.  Ordered shingrix  4.  Continue O2 therapy  5.  Varicose veins stable  6.  Type 2 DM controlled  7.  Follow up 3 months or sooner if needed  8.  DXA scan ordered

## 2018-03-26 DIAGNOSIS — I10 HYPERTENSION, ESSENTIAL: ICD-10-CM

## 2018-03-26 RX ORDER — LISINOPRIL 2.5 MG/1
2.5 TABLET ORAL DAILY
Qty: 90 TABLET | Refills: 1 | Status: SHIPPED | OUTPATIENT
Start: 2018-03-26 | End: 2018-09-21 | Stop reason: SDUPTHER

## 2018-03-26 RX ORDER — DILTIAZEM HYDROCHLORIDE 120 MG/1
CAPSULE, EXTENDED RELEASE ORAL
Qty: 90 CAPSULE | Refills: 3 | Status: SHIPPED | OUTPATIENT
Start: 2018-03-26 | End: 2019-04-08

## 2018-03-26 NOTE — TELEPHONE ENCOUNTER
ANNIE with [facility] tech re: clarifying medication. She states pharmacist will be back for 2pm from lunch.

## 2018-03-26 NOTE — TELEPHONE ENCOUNTER
----- Message from Amanuel Hill sent at 3/26/2018  9:54 AM CDT -----  Contact: [phone]/PT   Calling TO check on refill for Lisinopril,that was supposed to be called into [facility]. Pt states she's out,and needs refill ASAP.Pt states pharm needs direction for diabetic med as well

## 2018-03-26 NOTE — TELEPHONE ENCOUNTER
----- Message from Rajani Leary sent at 3/26/2018  9:01 AM CDT -----  Contact: [facility] [phone]  [facility] to clarify directions on this pt [facility]. [phone]. Thanks.........Trinity

## 2018-03-27 RX ORDER — LANCETS
EACH MISCELLANEOUS
Qty: 200 EACH | Refills: 0 | Status: SHIPPED | OUTPATIENT
Start: 2018-03-27 | End: 2018-09-19 | Stop reason: SDUPTHER

## 2018-04-04 DIAGNOSIS — J43.1 PANLOBULAR EMPHYSEMA: ICD-10-CM

## 2018-04-04 RX ORDER — THEOPHYLLINE 300 MG/1
TABLET, EXTENDED RELEASE ORAL
Qty: 180 TABLET | Refills: 2 | OUTPATIENT
Start: 2018-04-04

## 2018-04-06 ENCOUNTER — LAB VISIT (OUTPATIENT)
Dept: LAB | Facility: HOSPITAL | Age: 68
End: 2018-04-06
Attending: FAMILY MEDICINE
Payer: MEDICARE

## 2018-04-06 DIAGNOSIS — Z12.11 COLON CANCER SCREENING: ICD-10-CM

## 2018-04-06 PROCEDURE — 82274 ASSAY TEST FOR BLOOD FECAL: CPT

## 2018-04-09 LAB — HEMOCCULT STL QL IA: POSITIVE

## 2018-04-10 ENCOUNTER — TELEPHONE (OUTPATIENT)
Dept: FAMILY MEDICINE | Facility: CLINIC | Age: 68
End: 2018-04-10

## 2018-04-10 DIAGNOSIS — Z12.11 COLON CANCER SCREENING: Primary | ICD-10-CM

## 2018-04-10 NOTE — PROGRESS NOTES
Please let patient know that her stool test was positive for blood.  I would highly recommend that she have a colonoscopy.    Thanks,  Dr. Mcintosh

## 2018-04-10 NOTE — TELEPHONE ENCOUNTER
----- Message from Johnson Mcintosh MD sent at 4/10/2018  7:56 AM CDT -----  Please let patient know that her stool test was positive for blood.  I would highly recommend that she have a colonoscopy.    Thanks,  Dr. Mcintosh

## 2018-04-18 NOTE — TELEPHONE ENCOUNTER
Attempted to contact patient re: lab results. Phone rings for a bit then goes busy. I contacted patient's son and asked to have her call us re: her lab results.

## 2018-04-18 NOTE — TELEPHONE ENCOUNTER
----- Message from Liza Rowan sent at 4/18/2018  1:01 PM CDT -----  Contact: self  Pt returning call. She can be reached at [phone]. Thank you!

## 2018-04-18 NOTE — TELEPHONE ENCOUNTER
Pt informed of results and recommendation for colonoscopy; verbalized understanding; pt ok with you placing orders for colonoscopy; orders pended

## 2018-04-26 DIAGNOSIS — Z13.5 DIABETIC RETINOPATHY SCREENING: ICD-10-CM

## 2018-04-27 DIAGNOSIS — J43.1 PANLOBULAR EMPHYSEMA: ICD-10-CM

## 2018-04-27 RX ORDER — THEOPHYLLINE 300 MG/1
TABLET, EXTENDED RELEASE ORAL
Qty: 180 TABLET | Refills: 2 | Status: SHIPPED | OUTPATIENT
Start: 2018-04-27 | End: 2018-05-15 | Stop reason: SDUPTHER

## 2018-04-30 DIAGNOSIS — I10 HYPERTENSION, ESSENTIAL: ICD-10-CM

## 2018-04-30 RX ORDER — DILTIAZEM HYDROCHLORIDE 120 MG/1
CAPSULE, EXTENDED RELEASE ORAL
Qty: 90 CAPSULE | Refills: 3 | Status: SHIPPED | OUTPATIENT
Start: 2018-04-30 | End: 2018-05-15 | Stop reason: SDUPTHER

## 2018-05-04 DIAGNOSIS — E11.9 TYPE 2 DIABETES MELLITUS WITHOUT COMPLICATION: ICD-10-CM

## 2018-05-15 ENCOUNTER — LAB VISIT (OUTPATIENT)
Dept: LAB | Facility: HOSPITAL | Age: 68
End: 2018-05-15
Attending: FAMILY MEDICINE
Payer: MEDICARE

## 2018-05-15 ENCOUNTER — OFFICE VISIT (OUTPATIENT)
Dept: FAMILY MEDICINE | Facility: CLINIC | Age: 68
End: 2018-05-15
Payer: MEDICARE

## 2018-05-15 VITALS
HEIGHT: 61 IN | TEMPERATURE: 98 F | SYSTOLIC BLOOD PRESSURE: 118 MMHG | OXYGEN SATURATION: 90 % | HEART RATE: 87 BPM | WEIGHT: 252 LBS | DIASTOLIC BLOOD PRESSURE: 70 MMHG | BODY MASS INDEX: 47.58 KG/M2

## 2018-05-15 DIAGNOSIS — E11.9 TYPE 2 DIABETES MELLITUS WITHOUT COMPLICATION, WITHOUT LONG-TERM CURRENT USE OF INSULIN: ICD-10-CM

## 2018-05-15 DIAGNOSIS — E11.9 TYPE 2 DIABETES MELLITUS WITHOUT COMPLICATION, WITHOUT LONG-TERM CURRENT USE OF INSULIN: Primary | ICD-10-CM

## 2018-05-15 DIAGNOSIS — S90.121A HEMATOMA OF TOE OF RIGHT FOOT, INITIAL ENCOUNTER: ICD-10-CM

## 2018-05-15 DIAGNOSIS — I83.893 VARICOSE VEINS OF BOTH LEGS WITH EDEMA: ICD-10-CM

## 2018-05-15 DIAGNOSIS — Z78.0 HISTORY OF MENOPAUSE: ICD-10-CM

## 2018-05-15 DIAGNOSIS — Z11.59 ENCOUNTER FOR HEPATITIS C SCREENING TEST FOR LOW RISK PATIENT: ICD-10-CM

## 2018-05-15 PROCEDURE — 99215 OFFICE O/P EST HI 40 MIN: CPT | Mod: PBBFAC,PN | Performed by: FAMILY MEDICINE

## 2018-05-15 PROCEDURE — 83036 HEMOGLOBIN GLYCOSYLATED A1C: CPT

## 2018-05-15 PROCEDURE — 86803 HEPATITIS C AB TEST: CPT

## 2018-05-15 PROCEDURE — 36415 COLL VENOUS BLD VENIPUNCTURE: CPT | Mod: PN

## 2018-05-15 PROCEDURE — 99999 PR PBB SHADOW E&M-EST. PATIENT-LVL V: CPT | Mod: PBBFAC,,, | Performed by: FAMILY MEDICINE

## 2018-05-15 PROCEDURE — 99215 OFFICE O/P EST HI 40 MIN: CPT | Mod: S$PBB,,, | Performed by: FAMILY MEDICINE

## 2018-05-15 NOTE — PROGRESS NOTES
"Routine Office Visit    Patient Name: Johanna Mancini    : 1950  MRN: [id number]    Subjective:  Johanna is a 68 y.o. female who presents today for:    1. Type 2 DM  Patient presenting today for follow up of her type 2 DM.  She states that she has not been checking blood sugars stating she never got test strips.  She reports that she called to get directions placed on the prescription, but even after that the pharmacist didn't fill them.  There has been no admissions for hyperglycemia.  There has been no polydipsia or polyuria.     2.  COPD  Patient states that her COPD has been stable.  There has been no increase in wheezing.  She has been taking all of her inhalers and nebulizers as prescribed.  She has no acute trouble.    3.  Toe injury  Patient presenting today with hematoma under right great toe after hitting it last week.  She states that the nail turned black and started to lift off the toe.  She has clipped the toenail as much as she could.  She is requesting referral to podiatry for further work up.    4.  Varicose veins  Patient presenting today with varicosities in bilateral lower extremities.  She states that every time she hits one varicose vein on her RLE it bled heavily.  She states that her previous PCP never did any work up.  She denies ever having any US.  She states that she was told nothing could be done for varicose veins unless "there was a clot".      5.  DXA scan  Patient states that she wasn't aware of needing a DXA scan.  She is on chronic daily inhaled steroids and is over the age of 63.  She denies any pathological fractures.  She states that she was diagnosed with osteoporosis, but just doesn't follow up on it regularly.  She has no joint or bone pains at this time.    Past Medical History  Past Medical History:   Diagnosis Date    Articular gout     CHF (congestive heart failure)     COPD (chronic obstructive pulmonary disease)     Diabetes mellitus, type 2     Hypertension  "    Osteopetrosis     Osteoporosis     Sleep apnea        Past Surgical History  Past Surgical History:   Procedure Laterality Date    CHOLECYSTECTOMY      HYSTERECTOMY      MINERVA equivalent       Family History  Family History   Problem Relation Age of Onset    Heart disease Mother     Cancer Father     Cancer Sister         breast       Social History  Social History     Social History    Marital status:      Spouse name: N/A    Number of children: N/A    Years of education: N/A     Occupational History    Not on file.     Social History Main Topics    Smoking status: Former Smoker    Smokeless tobacco: Never Used    Alcohol use No    Drug use: No    Sexual activity: Not on file     Other Topics Concern    Not on file     Social History Narrative    .  Three children. Former smoker, quit 15 yr ago.  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        Current Medications  Current Outpatient Prescriptions on File Prior to Visit   Medication Sig Dispense Refill    albuterol (PROVENTIL) 2.5 mg /3 mL (0.083 %) nebulizer solution Inhale into the lungs.      allopurinol (ZYLOPRIM) 100 MG tablet Take 1 tablet (100 mg total) by mouth once daily. 90 tablet 1    budesonide-formoterol 160-4.5 mcg (SYMBICORT) 160-4.5 mcg/actuation HFAA Inhale into the lungs.      cloNIDine (CATAPRES) 0.1 MG tablet Take 1 tablet (0.1 mg total) by mouth 2 (two) times daily. 180 tablet 1    diltiaZEM (CARDIZEM CD) 120 MG Cp24 Take 1 capsule (120 mg total) by mouth once daily. 90 capsule 1    hydroCHLOROthiazide (HYDRODIURIL) 25 MG tablet Take 1 tablet (25 mg total) by mouth once daily. 90 tablet 1    ibuprofen (ADVIL,MOTRIN) 200 MG tablet Take 1 tablet (200 mg total) by mouth every 6 (six) hours as needed.      ipratropium (ATROVENT) 0.02 % nebulizer solution Inhale into the lungs.      lisinopril (PRINIVIL,ZESTRIL) 2.5 MG tablet Take 1 tablet (2.5 mg total) by mouth once daily. 90 tablet 1    metFORMIN  "(GLUCOPHAGE) 500 MG tablet Take 1 tablet (500 mg total) by mouth daily with breakfast. 90 tablet 1    theophylline (THEODUR) 300 MG 12 hr tablet Take 1 tablet (300 mg total) by mouth 2 (two) times daily. 180 tablet 1    blood-glucose meter Misc TEST BLOOD SUGAR AS DIRECTED WITH TRUE METRIX MACHINE 1 each 0    CARTIA  mg Cp24 TAKE ONE CAPSULE BY MOUTH ONCE DAILY 90 capsule 3    lancets (ONETOUCH ULTRASOFT LANCETS) Misc USE WITH TRUE METRIX METER AND TESTS STRIPS twice daily 200 each 0    [DISCONTINUED] blood sugar diagnostic Strp TEST BLOOD SUGARS AS DIRECTED WITH TRUE METRIX TEST STRIPS TWICE DAILY 200 strip 0    [DISCONTINUED] CARTIA  mg Cp24 TAKE ONE CAPSULE BY MOUTH ONCE DAILY 90 capsule 3    [DISCONTINUED] theophylline (THEODUR) 300 MG 12 hr tablet TAKE 1 TABLET BY MOUTH TWICE DAILY 180 tablet 2     No current facility-administered medications on file prior to visit.        Allergies   Review of patient's allergies indicates:   Allergen Reactions    Tylenol [acetaminophen] Anaphylaxis    Levofloxacin Itching       Review of Systems (Pertinent positives)  Review of Systems   Constitutional: Negative.    HENT: Negative.    Eyes: Negative.    Respiratory: Negative.    Cardiovascular: Negative.    Gastrointestinal: Negative.    Musculoskeletal: Negative.         +hematoma under right great toenail   Skin: Negative.    Neurological: Negative for seizures, loss of consciousness and headaches.         /70   Pulse 87   Temp 98 °F (36.7 °C) (Oral)   Ht 5' 1" (1.549 m)   Wt 114.3 kg (251 lb 15.8 oz)   SpO2 (!) 90%   BMI 47.61 kg/m²     GENERAL APPEARANCE: in no apparent distress and well developed and well nourished  HEENT: PERRLA, EOMI, Sclera clear, anicteric, Oropharynx clear, no lesions, Neck supple with midline trachea  NECK: normal, supple, no adenopathy, thyroid normal in size  RESPIRATORY: appears well, vitals normal, no respiratory distress, acyanotic, normal RR, chest clear, no " wheezing, crepitations, rhonchi, normal symmetric air entry  HEART: regular rate and rhythm, S1, S2 normal, no murmur, click, rub or gallop.    ABDOMEN: abdomen is soft without tenderness, no masses, no hernias, no organomegaly, no rebound, no guarding. Suprapubic tenderness absent. No CVA tenderness.  NEUROLOGIC: normal without focal findings, CN II-XII are intact.    Extremities: warm/well perfused.  No abnormal hair patterns.  No clubbing, cyanosis or edema.  no muscular tenderness noted, full range of motion without pain.  no joint tenderness, deformity or swelling noted.  SKIN: hematoma noted under right great toenail  PSYCH: Alert, oriented x 3, thought content appropriate, speech normal, pleasant and cooperative, good eye contact, well groomed    Assessment/Plan:  Johanna Mancini is a 68 y.o. female who presents today for :    Johanna was seen today for ingrown toenail and umbilical hernia.    Diagnoses and all orders for this visit:    Type 2 diabetes mellitus without complication, without long-term current use of insulin  -     Hemoglobin A1c; Future  -     blood sugar diagnostic Strp; TEST BLOOD SUGARS  TWICE DAILY FOR TRUMETRIX GLUCOMETER    Encounter for hepatitis C screening test for low risk patient  -     Hepatitis C antibody; Future    History of menopause  -     DXA Bone Density Spine And Hip; Future    Hematoma of toe of right foot, initial encounter  -     Ambulatory referral to Podiatry    Varicose veins of both legs with edema  -     Ambulatory referral to Vascular Surgery  -     US Lower Extremity Veins Bilateral Insufficiency; Future    Other orders  -     Cancel: Hemoglobin A1c; Future      1.  Labs to be done today  2.  Refer to vascular for varicosities  3.  US for varicosities  4.  DXA scan reordered today  5.  Will screen for hep c per USPSTF guidelines  6.  Follow up 3 months or sooner if needed    Johnson Mcintosh MD

## 2018-05-16 LAB
ESTIMATED AVG GLUCOSE: 128 MG/DL
HBA1C MFR BLD HPLC: 6.1 %
HCV AB SERPL QL IA: NEGATIVE

## 2018-05-18 NOTE — PROGRESS NOTES
Please let patient know that her labs looked great.  I would like to repeat them in 6 months.    Thanks,  Dr. Mcintosh

## 2018-05-21 ENCOUNTER — TELEPHONE (OUTPATIENT)
Dept: FAMILY MEDICINE | Facility: CLINIC | Age: 68
End: 2018-05-21

## 2018-05-21 NOTE — TELEPHONE ENCOUNTER
----- Message from Johnson Mcintosh MD sent at 5/18/2018  3:46 PM CDT -----  Please let patient know that her labs looked great.  I would like to repeat them in 6 months.    Thanks,  Dr. Mcintosh

## 2018-05-30 DIAGNOSIS — M1A.09X0 IDIOPATHIC CHRONIC GOUT OF MULTIPLE SITES WITHOUT TOPHUS: ICD-10-CM

## 2018-05-31 RX ORDER — ALLOPURINOL 100 MG/1
TABLET ORAL
Qty: 90 TABLET | Refills: 0 | Status: SHIPPED | OUTPATIENT
Start: 2018-05-31 | End: 2018-08-20 | Stop reason: SDUPTHER

## 2018-06-06 ENCOUNTER — TELEPHONE (OUTPATIENT)
Dept: FAMILY MEDICINE | Facility: CLINIC | Age: 68
End: 2018-06-06

## 2018-06-06 NOTE — TELEPHONE ENCOUNTER
Contacted [facility] explained the med necessity form was faxed on 6/1/18 and again today. Phar will check to make sure medicare received it.

## 2018-06-06 NOTE — TELEPHONE ENCOUNTER
----- Message from Jess Bill sent at 6/5/2018  3:56 PM CDT -----  Contact: [facility]   Checking on status of paperwork that was faxed over on 06/01 from Medicare Dept regarding  Diabetic Necessity form. Rep states pt cannot get Lancets filled until this is completed.      [phone].

## 2018-06-06 NOTE — TELEPHONE ENCOUNTER
----- Message from Amanuel Hill sent at 6/6/2018 11:26 AM CDT -----  Contact: [facility] [phone]  Returning call regarding [facility] [place] left to call office

## 2018-06-20 ENCOUNTER — HOSPITAL ENCOUNTER (OUTPATIENT)
Dept: RADIOLOGY | Facility: HOSPITAL | Age: 68
Discharge: HOME | End: 2018-06-20
Attending: FAMILY MEDICINE
Payer: MEDICARE

## 2018-06-20 DIAGNOSIS — I83.893 VARICOSE VEINS OF BOTH LEGS WITH EDEMA: ICD-10-CM

## 2018-06-20 PROCEDURE — 93970 EXTREMITY STUDY: CPT | Mod: TC

## 2018-06-20 PROCEDURE — 93970 EXTREMITY STUDY: CPT | Mod: 26,,, | Performed by: RADIOLOGY

## 2018-06-21 ENCOUNTER — OFFICE VISIT (OUTPATIENT)
Dept: VASCULAR SURGERY | Facility: CLINIC | Age: 68
End: 2018-06-21
Payer: MEDICARE

## 2018-06-21 VITALS
DIASTOLIC BLOOD PRESSURE: 70 MMHG | WEIGHT: 256.19 LBS | HEART RATE: 74 BPM | SYSTOLIC BLOOD PRESSURE: 140 MMHG | HEIGHT: 66 IN | BODY MASS INDEX: 41.17 KG/M2

## 2018-06-21 DIAGNOSIS — I87.2 VENOUS INSUFFICIENCY OF BOTH LOWER EXTREMITIES: Primary | ICD-10-CM

## 2018-06-21 DIAGNOSIS — R09.89 LEFT CAROTID BRUIT: ICD-10-CM

## 2018-06-21 PROCEDURE — 99204 OFFICE O/P NEW MOD 45 MIN: CPT | Mod: S$PBB,,, | Performed by: SURGERY

## 2018-06-21 PROCEDURE — 99999 PR PBB SHADOW E&M-EST. PATIENT-LVL III: CPT | Mod: PBBFAC,,, | Performed by: SURGERY

## 2018-06-21 PROCEDURE — 99213 OFFICE O/P EST LOW 20 MIN: CPT | Mod: PBBFAC | Performed by: SURGERY

## 2018-06-21 NOTE — LETTER
June 21, 2018      Johnson Mcintosh MD  [address] [facility] [address]           [facility]  [address] [facility] [address]  [place] [address]  Phone: [phone]  Fax: [phone]          Patient: Johanna Mancini   MR Number: [id number]   YOB: 1950   Date of Visit: 6/21/2018       Dear Dr. Johnson BENNETT Page:    Thank you for referring Johanna Mancini to me for evaluation. Attached you will find relevant portions of my assessment and plan of care.    If you have questions, please do not hesitate to call me. I look forward to following Johanna Mancini along with you.    Sincerely,    Leoncio Shaver MD    Enclosure  CC:  No Recipients    If you would like to receive this communication electronically, please contact [email or web address] or [phone] to request more information on [employer] access.    For providers and/or their staff who would like to refer a patient to [facility], please contact us through our one-stop-shop provider referral line, [facility], at [phone].    If you feel you have received this communication in error or would no longer like to receive these types of communications, please e-mail [email or web address]

## 2018-06-21 NOTE — PROGRESS NOTES
Leoncio Shaver MD VI                       [facility]                         06/21/2018    HPI:  Johanna Mancini is a 68 y.o. female with   Patient Active Problem List   Diagnosis    Varicose veins of both lower extremities with pain    COPD (chronic obstructive pulmonary disease)    Gout    Hypoxemia requiring supplemental oxygen    Hypertension, essential    Type 2 diabetes mellitus without complication, without long-term current use of insulin    Hemoglobin A1c less than 7.0%    Idiopathic chronic gout of multiple sites without tophus    Age-related osteoporosis without current pathological fracture    being managed by PCP and specialists who is here today for evaluation of BLE varicose veins.  Patient states location is lower aspect of legs bilaterally occurring for many years.  Associated signs and symptoms include bleeding.  Has R posterior calf pain.  Quality is sharp and severity is 5/10.  Symptoms began several years ago.  Alleviating factors include elevation.  Worsening factors include dependency.    no MI  no Stroke  Tobacco use: quit 20 yrs ago    Past Medical History:   Diagnosis Date    Articular gout     CHF (congestive heart failure)     COPD (chronic obstructive pulmonary disease)     Diabetes mellitus, type 2     Hypertension     Osteopetrosis     Osteoporosis     Sleep apnea      Past Surgical History:   Procedure Laterality Date    CHOLECYSTECTOMY      HYSTERECTOMY      MINERVA equivalent     Family History   Problem Relation Age of Onset    Heart disease Mother     Cancer Father     Cancer Sister         breast     Social History     Social History    Marital status:      Spouse name: N/A    Number of children: N/A    Years of education: N/A     Occupational History    Not on file.     Social History Main Topics    Smoking status: Former Smoker    Smokeless tobacco: Never Used    Alcohol use No    Drug use: No    Sexual activity:  Not on file     Other Topics Concern    Not on file     Social History Narrative    .  Three children. Former smoker, quit 15 yr ago.  Retired , cook and kitchen work.        Current Outpatient Prescriptions:     albuterol (PROVENTIL) 2.5 mg /3 mL (0.083 %) nebulizer solution, Inhale into the lungs., Disp: , Rfl:     allopurinol (ZYLOPRIM) 100 MG tablet, Take 1 tablet (100 mg total) by mouth once daily., Disp: 90 tablet, Rfl: 1    allopurinol (ZYLOPRIM) 100 MG tablet, TAKE 1 TABLET BY MOUTH ONCE DAILY, Disp: 90 tablet, Rfl: 0    blood sugar diagnostic Strp, TEST BLOOD SUGARS  TWICE DAILY FOR TRUMETRIX GLUCOMETER, Disp: 200 strip, Rfl: 0    blood-glucose meter Misc, TEST BLOOD SUGAR AS DIRECTED WITH TRUE METRIX MACHINE, Disp: 1 each, Rfl: 0    budesonide-formoterol 160-4.5 mcg (SYMBICORT) 160-4.5 mcg/actuation HFAA, Inhale into the lungs., Disp: , Rfl:     CARTIA  mg Cp24, TAKE ONE CAPSULE BY MOUTH ONCE DAILY, Disp: 90 capsule, Rfl: 3    cloNIDine (CATAPRES) 0.1 MG tablet, Take 1 tablet (0.1 mg total) by mouth 2 (two) times daily., Disp: 180 tablet, Rfl: 1    diltiaZEM (CARDIZEM CD) 120 MG Cp24, Take 1 capsule (120 mg total) by mouth once daily., Disp: 90 capsule, Rfl: 1    hydroCHLOROthiazide (HYDRODIURIL) 25 MG tablet, Take 1 tablet (25 mg total) by mouth once daily., Disp: 90 tablet, Rfl: 1    ibuprofen (ADVIL,MOTRIN) 200 MG tablet, Take 1 tablet (200 mg total) by mouth every 6 (six) hours as needed., Disp: , Rfl:     ipratropium (ATROVENT) 0.02 % nebulizer solution, Inhale into the lungs., Disp: , Rfl:     lancets (ONETOUCH ULTRASOFT LANCETS) Misc, USE WITH TRUE METRIX METER AND TESTS STRIPS twice daily, Disp: 200 each, Rfl: 0    lisinopril (PRINIVIL,ZESTRIL) 2.5 MG tablet, Take 1 tablet (2.5 mg total) by mouth once daily., Disp: 90 tablet, Rfl: 1    metFORMIN (GLUCOPHAGE) 500 MG tablet, Take 1 tablet (500 mg total) by mouth daily with breakfast., Disp: 90 tablet, Rfl: 1     theophylline (THEODUR) 300 MG 12 hr tablet, Take 1 tablet (300 mg total) by mouth 2 (two) times daily., Disp: 180 tablet, Rfl: 1    REVIEW OF SYSTEMS:  General: No fevers or chills; ENT: No sore throat; Allergy and Immunology: no persistent infections; Hematological and Lymphatic: No history of bleeding or easy bruising; Endocrine: negative; Respiratory: no cough, shortness of breath, or wheezing; Cardiovascular: no chest pain or dyspnea on exertion; Gastrointestinal: no abdominal pain/back, change in bowel habits, or bloody stools; Genito-Urinary: no dysuria, trouble voiding, or hematuria; Musculoskeletal: negative; Neurological: no TIA or stroke symptoms; Psychiatric: no nervousness, anxiety or depression.    PHYSICAL EXAM:   Right Arm BP - Sittin/70 (18 1541)  Left Arm BP - Sittin/70 (18 1541)  Pulse: 74         General appearance:  Alert, well-appearing, and in no distress.  Oriented to person, place, and time                    Neurological: Normal speech, no focal findings noted; CN II - XII grossly intact. RLE with sensation to light touch, LLE with sensation to light touch.            Musculoskeletal: Digits/nail without cyanosis/clubbing.  Strength 5/5 BLE.                    Neck: Supple, no significant adenopathy, L carotid bruit can be auscultated                  Chest:  Clear to auscultation, no wheezes, rales or rhonchi, symmetric air entry. No use of accessory muscles               Cardiac: Normal rate and regular rhythm, S1 and S2 normal            Abdomen: Soft, nontender, nondistended, no masses or organomegaly, no hernia     No rebound tenderness noted; bowel sounds normal     No groin adenopathy      Extremities:   2+ R femoral pulse, 2+ L femoral pulse     1+ R popliteal pulse, 1+ L popliteal pulse     1+ R PT pulse, 1+ L PT pulse     1+ R DP pulse, 1+ L DP pulse     2+ RLE edema, 2+ LLE edema    Skin: BLE with varicose veins and spider veins    LAB RESULTS:  No results  found for: CBC  Lab Results   Component Value Date    LABPROT 10.7 12/17/2011    INR 1.0 12/17/2011     Lab Results   Component Value Date     01/25/2018    K 3.8 01/25/2018    CL 97 01/25/2018    CO2 30 (H) 01/25/2018     01/25/2018    BUN 18 01/25/2018    CREATININE 0.8 01/25/2018    CALCIUM 9.9 01/25/2018    ANIONGAP 14 01/25/2018    EGFRNONAA >60 01/25/2018     Lab Results   Component Value Date    WBC 5.85 01/25/2018    RBC 4.86 01/25/2018    HGB 13.7 01/25/2018    HCT 43.3 01/25/2018    MCV 89 01/25/2018    MCH 28.2 01/25/2018    MCHC 31.6 (L) 01/25/2018    RDW 13.7 01/25/2018     01/25/2018    MPV 12.2 01/25/2018    GRAN 44.0 01/25/2018    LYMPH 40.0 01/25/2018    MONO 7.0 01/25/2018    EOS 0.2 06/27/2014    BASO 0.01 06/27/2014    EOSINOPHIL 3.0 01/25/2018    BASOPHIL 0.0 01/25/2018    DIFFMETHOD Automated 01/25/2018     .  Lab Results   Component Value Date    HGBA1C 6.1 (H) 05/15/2018       IMAGING:  All pertinent imaging has been reviewed and interpreted independently.    Bilateral GSV reflux.  No DVT.    IMP/PLAN:  68 y.o. female with   Patient Active Problem List   Diagnosis    Varicose veins of both lower extremities with pain    COPD (chronic obstructive pulmonary disease)    Gout    Hypoxemia requiring supplemental oxygen    Hypertension, essential    Type 2 diabetes mellitus without complication, without long-term current use of insulin    Hemoglobin A1c less than 7.0%    Idiopathic chronic gout of multiple sites without tophus    Age-related osteoporosis without current pathological fracture    being managed by PCP and specialists who is here today for evaluation of BLE edema and varicose veins.    -recommend compression with Rx stockings, elevation, dietary changes associated with water and sodium intake discussed at length with patient  -RTC 3 mo for further evalatuion    I spent 30 minutes evaluating this patient and greater than 50% of the time was spent counseling,  coordinator care and discussing the plan of care.  All questions were answered and patient stated understanding with agreement with the above treatment plan.    Leoncio Shaver MD RPVI  Vascular and Endovascular Surgery

## 2018-06-21 NOTE — PATIENT INSTRUCTIONS
Low-Salt Diet  This diet removes foods that are high in salt. It also limits the amount of salt you use when cooking. It is most often used for people with high blood pressure, edema (fluid retention), and kidney, liver, or heart disease.  Table salt contains the mineral sodium. Your body needs sodium to work normally. But too much sodium can make your health problems worse. Your healthcare provider is recommending a low-salt (also called low-sodium) diet for you. Your total daily allowance of salt is 1,500 to 2,300 milligrams (mg). It is less than 1 teaspoon of table salt. This means you can have only about 500 to 700 mg of sodium at each meal. People with certain health problems should limit salt intake to the lower end of the recommended range.    When you cook, dont add much salt. If you can cook without using salt, even better. Dont add salt to your food at the table.  When shopping, read food labels. Salt is often called sodium on the label. Choose foods that are salt-free, low salt, or very low salt. Note that foods with reduced salt may not lower your salt intake enough.    Beans, potatoes, and pasta  Ok: Dry beans, split peas, lentils, potatoes, rice, macaroni, pasta, spaghetti without added salt  Avoid: Potato chips, tortilla chips, and similar products  Breads and cereals  Ok: Low-sodium breads, rolls, cereals, and cakes; low-salt crackers, matzo crackers  Avoid: Salted crackers, pretzels, popcorn, Faroese toast, pancakes, muffins  Dairy  Ok: Milk, chocolate milk, hot chocolate mix, low-salt cheeses, and yogurt  Avoid: Processed cheese and cheese spreads; Roquefort, Camembert, and cottage cheese; buttermilk, instant breakfast drink  Desserts  Ok: Ice cream, frozen yogurt, juice bars, gelatin, cookies and pies, sugar, honey, jelly, hard candy  Avoid: Most pies, cakes and cookies prepared or processed with salt; instant pudding  Drinks  Ok: Tea, coffee, fizzy (carbonated) drinks, juices  Avoid: Flavored  coffees, electrolyte replacement drinks, sports drinks  Meats  Ok: All fresh meat, fish, poultry, low-salt tuna, eggs, egg substitute  Avoid: Smoked, pickled, brine-cured, or salted meats and fish. This includes camejo, chipped beef, corned beef, hot dogs, deli meats, ham, kosher meats, salt pork, sausage, canned tuna, salted codfish, smoked salmon, herring, sardines, or anchovies.  Seasonings and spices  Ok: Most seasonings are okay. Good substitutes for salt include: fresh herb blends, hot sauce, lemon, garlic, han, vinegar, dry mustard, parsley, cilantro, horseradish, tomato paste, regular margarine, mayonnaise, unsalted butter, cream cheese, vegetable oil, cream, low-salt salad dressing and gravy.  Avoid: Regular ketchup, relishes, pickles, soy sauce, teriyaki sauce, Worcestershire sauce, BBQ sauce, tartar sauce, meat tenderizer, chili sauce, regular gravy, regular salad dressing, salted butter  Soups  Ok: Low-salt soups and broths made with allowed foods  Avoid: Bouillon cubes, soups with smoked or salted meats, regular soup and broth  Vegetables  Ok: Most vegetables are okay; also low-salt tomato and vegetable juices  Avoid: Sauerkraut and other brine-soaked vegetables; pickles and other pickled vegetables; tomato juice, olives  Date Last Reviewed: 8/1/2016 © 2000-2017 [employer]. [address] [place] [address]. All rights reserved. This information is not intended as a substitute for professional medical care. Always follow your healthcare professional's instructions.        Tips for Using Less Salt    Most people with heart problems need to eat less salt (sodium). Reducing the amount of salt you eat may help control your blood pressure. The higher your blood pressure, the greater your risk for heart disease, stroke, blindness, and kidney problems.  At the store  · Make low-salt choices by reading labels carefully. Look for the total amount of sodium per serving.  · Use more fresh  food. Buy more fruits and vegetables. Select lean meats, fish, and poultry.  · Use fewer frozen, canned, and packaged foods which often contain a lot of sodium.  · Use plain frozen vegetables without sauces or toppings. These products are often low- or no-sodium.  · Opt for reduced-sodium or no-salt-added versions of canned vegetables and soups.  In the kitchen  · Don't add salt to food when you're cooking. Season with flavorings such as onion, garlic, pepper, salt-free herbal blends, and lemon or lime juice.  · Use a cookbook containing low-salt recipes. It can give you ideas for tasty meals that are healthy for your heart.  · Sprinkle salt-free herbal blends on vegetables and meat.  · Drain and rinse canned foods, such as canned beans and vegetables, before cooking or eating.  Eating out  · Tell the  you're on a low-salt diet. Ask questions about the menu.  · Order fish, chicken, and meat broiled, baked, poached, or grilled without salt, butter, or breading.  · Use lemon, pepper, and salt-free herb mixes to add flavor.  · Choose plain steamed rice, boiled noodles, and baked or boiled potatoes. Top potatoes with chives and a little sour cream.     Beware! Salt goes by many other names. Limit foods with these words listed as ingredients: salt, sodium, soy sauce, baking soda, baking powder, MSG, monosodium, Na (the chemical symbol for sodium). Some antacids are also high in salt.   Date Last Reviewed: 6/19/2015  © 6395-1909 [employer]. [address] [place] [address], [place] [address]. All rights reserved. This information is not intended as a substitute for professional medical care. Always follow your healthcare professional's instructions.        Endovenous Laser Treatment (EVLT) for Varicose Veins  Endovenous laser treatment (EVLT) is a procedure that uses laser heat to treat varicose veins.   Varicose veins are swollen and enlarged veins. They occur most often in the legs. Varicose veins can develop  when valves in your veins become damaged. This causes problems with blood flow. Over time, too much blood collects in the veins. The veins may bulge, twist, and stand out under your skin. They can also cause symptoms such as aching, cramping, or swelling in your legs.  During EVLT, heat created using a laser is sent into the vein through a thin, flexible tube (catheter). This closes off blood flow in the main problem vein.  Getting ready for your treatment  Follow any instructions from your healthcare provider.  Tell your healthcare provider if you:  · Are pregnant or think you may be pregnant  · Are breastfeeding  · Smoke or use alcohol on a regular basis  · Have other health problems, such as diabetes or kidney problems  Tell your provider about any medicines you are taking. You may need to stop taking all or some of these before the procedure. This includes:  · Medicines that can thin your blood or prevent clotting (anticoagulants)  · All prescription medicines  · Over-the-counter medicines such as aspirin or ibuprofen  · Street drugs  · Herbs, vitamins, and other supplements  Follow any directions youre given for not eating or drinking before the treatment.  The day of your treatment    The treatment takes 45 to 60 minutes. The entire treatment (including time to prepare and recover) takes about 1 to 3 hours. You can go home the same day. For the treatment:  · Youll lie down on a hospital bed.  · An imaging method, such as ultrasound, is used to guide the procedure.  · The leg to be treated is injected with numbing medicine.  · Once your leg is numb, a needle makes a small hole (puncture) in the vein to be treated.  · The catheter containing the laser heat source is inserted into your vein.  · More numbing medicine may be injected around the vein.  · Once the catheter is in the right position, it is then slowly drawn backward. As the catheter sends out heat, the vein is closed off.  · In some cases, other side  branch varicose veins may be removed or tied off through several small cuts (incisions).  · When the treatment is done, the catheter is removed. Pressure is applied to the insertion site to stop any bleeding. An elastic compression stocking or a bandage may then be put on your leg.  Recovering at home  Once at home, follow all the instructions youve been given. Be sure to:  · Take all medicines as directed  · Care for the catheter insertion site as directed  · Check for signs of infection at the catheter insertion site (see below)  · Wear elastic stockings or bandages as directed  · Keep your legs raised (elevated) as directed  · Walk a few times a day  · Avoid heavy exercise, lifting, and standing for long periods as advised  · Avoid air travel, hot baths, saunas, or whirlpools as advised  Call your healthcare provider  Call your healthcare provider if you have any of the following:  · Fever of 100.4°F (38°C) or higher, or as directed by your provider  · Chest pain or trouble breathing  · Signs of infection at the catheter insertion site. These include greater redness or swelling (inflammation), warmth, increasing pain, bleeding, or bad-smelling discharge.  · Severe numbness or tingling in the treated leg  · Severe pain or swelling in the treated leg   Follow-up  Youll have a follow-up visit with your healthcare provider within a week. An ultrasound will likely be done to check for problems, such as blood clots. Your provider will discuss more treatments with you, if needed.   Risk and possible complications  These include:  · Bleeding  · Infection  · Blood clots  · Damage to the nerves in the treated area  · Irritation or burning of the skin over the treated vein  · Treatment doesn't improve the look or the symptoms of the problem veins  · Risks of any medicines used during the treatment   Date Last Reviewed: 5/1/2016  © 0887-1676 [employer]. [address] [place] [address], [place] [address]. All rights  reserved. This information is not intended as a substitute for professional medical care. Always follow your healthcare professional's instructions.

## 2018-07-01 NOTE — PROGRESS NOTES
Please let patient know that her ultrasounds know she does have some evidence of veinous disease.  I would like for her to see vascular doctor if she is ok with this.    Dr. Mcintosh

## 2018-07-09 ENCOUNTER — TELEPHONE (OUTPATIENT)
Dept: FAMILY MEDICINE | Facility: CLINIC | Age: 68
End: 2018-07-09

## 2018-07-09 ENCOUNTER — OFFICE VISIT (OUTPATIENT)
Dept: PODIATRY | Facility: CLINIC | Age: 68
End: 2018-07-09
Payer: MEDICARE

## 2018-07-09 VITALS — WEIGHT: 256 LBS | BODY MASS INDEX: 41.14 KG/M2 | HEIGHT: 66 IN

## 2018-07-09 DIAGNOSIS — E11.9 CONTROLLED TYPE 2 DIABETES MELLITUS WITHOUT COMPLICATION, UNSPECIFIED WHETHER LONG TERM INSULIN USE: Primary | ICD-10-CM

## 2018-07-09 DIAGNOSIS — I73.9 PVD (PERIPHERAL VASCULAR DISEASE): ICD-10-CM

## 2018-07-09 DIAGNOSIS — B35.1 ONYCHOMYCOSIS DUE TO DERMATOPHYTE: ICD-10-CM

## 2018-07-09 DIAGNOSIS — L84 CORN OR CALLUS: ICD-10-CM

## 2018-07-09 DIAGNOSIS — E11.9 COMPREHENSIVE DIABETIC FOOT EXAMINATION, TYPE 2 DM, ENCOUNTER FOR: ICD-10-CM

## 2018-07-09 PROCEDURE — 11721 DEBRIDE NAIL 6 OR MORE: CPT | Mod: Q8,59,S$PBB, | Performed by: PODIATRIST

## 2018-07-09 PROCEDURE — 99203 OFFICE O/P NEW LOW 30 MIN: CPT | Mod: 25,S$PBB,, | Performed by: PODIATRIST

## 2018-07-09 PROCEDURE — 11056 PARNG/CUTG B9 HYPRKR LES 2-4: CPT | Mod: Q8,PBBFAC,PO | Performed by: PODIATRIST

## 2018-07-09 PROCEDURE — 99212 OFFICE O/P EST SF 10 MIN: CPT | Mod: PBBFAC,PO,25 | Performed by: PODIATRIST

## 2018-07-09 PROCEDURE — 11721 DEBRIDE NAIL 6 OR MORE: CPT | Mod: Q8,PBBFAC,PO,59 | Performed by: PODIATRIST

## 2018-07-09 PROCEDURE — 99999 PR PBB SHADOW E&M-EST. PATIENT-LVL II: CPT | Mod: PBBFAC,,, | Performed by: PODIATRIST

## 2018-07-09 PROCEDURE — 11056 PARNG/CUTG B9 HYPRKR LES 2-4: CPT | Mod: Q8,S$PBB,, | Performed by: PODIATRIST

## 2018-07-09 NOTE — TELEPHONE ENCOUNTER
----- Message from Johnson Mcintosh MD sent at 7/1/2018  5:09 PM CDT -----  Please let patient know that her ultrasounds know she does have some evidence of veinous disease.  I would like for her to see vascular doctor if she is ok with this.    Dr. Mcintosh

## 2018-07-09 NOTE — PROGRESS NOTES
Subjective:      Patient ID: Johanna Mancini is a 68 y.o. female.    Chief Complaint: Diabetic Foot Exam (Pcp  Page  5/15/2018); Diabetes Mellitus; and Nail Care    Johanna is a 68 y.o. female who presents to the clinic for evaluation and treatment of high risk feet. Johanna has a past medical history of Articular gout; CHF (congestive heart failure); COPD (chronic obstructive pulmonary disease); Diabetes mellitus, type 2; Hypertension; Osteopetrosis; Osteoporosis; and Sleep apnea. The patient's chief complaint is long, thick toenails and uncomfortable calluses on bottom of the R foot which hurt when she walks. Previously saw outside podiatrist but has not done so in nearly a year. Patient is here to have comprehensive DM foot exam and to establish care per recommendations of PCP. No other pedal complaints other than nails and calluses and need for comp. DM foot exam.    This patient has documented high risk feet requiring routine maintenance secondary to peripheral vascular disease.    PCP: Johnson Mcintosh MD    Date Last Seen by PCP:   Chief Complaint   Patient presents with    Diabetic Foot Exam     Pcp  Page  5/15/2018    Diabetes Mellitus    Nail Care         Current shoe gear:    Rx diabetic extra depth shoes and custom accommodative insoles    Hemoglobin A1C   Date Value Ref Range Status   05/15/2018 6.1 (H) 4.0 - 5.6 % Final     Comment:     According to ADA guidelines, hemoglobin A1c <7.0% represents  optimal control in non-pregnant diabetic patients. Different  metrics may apply to specific patient populations.   Standards of Medical Care in Diabetes-2016.  For the purpose of screening for the presence of diabetes:  <5.7%     Consistent with the absence of diabetes  5.7-6.4%  Consistent with increasing risk for diabetes   (prediabetes)  >or=6.5%  Consistent with diabetes  Currently, no consensus exists for use of hemoglobin A1c  for diagnosis of diabetes for children.  This Hemoglobin A1c assay has  "significant interference with fetal   hemoglobin   (HbF). The results are invalid for patients with abnormal amounts of   HbF,   including those with known Hereditary Persistence   of Fetal Hemoglobin. Heterozygous hemoglobin variants (HbAS, HbAC,   HbAD, HbAE, HbA2) do not significantly interfere with this assay;   however, presence of multiple variants in a sample may impact the %   interference.     10/16/2017 6.2 3.1 - 6.5 %      Vitals:    07/09/18 1452   Weight: 116.1 kg (256 lb)   Height: 5' 6" (1.676 m)   PainSc:   5   PainLoc: Foot     Past Medical History:   Diagnosis Date    Articular gout     CHF (congestive heart failure)     COPD (chronic obstructive pulmonary disease)     Diabetes mellitus, type 2     Hypertension     Osteopetrosis     Osteoporosis     Sleep apnea        Past Surgical History:   Procedure Laterality Date    CHOLECYSTECTOMY      HYSTERECTOMY      MINERVA equivalent       Family History   Problem Relation Age of Onset    Heart disease Mother     Cancer Father     Cancer Sister         breast       Social History     Social History    Marital status:      Spouse name: N/A    Number of children: N/A    Years of education: N/A     Social History Main Topics    Smoking status: Former Smoker    Smokeless tobacco: Never Used    Alcohol use No    Drug use: No    Sexual activity: Not Asked     Other Topics Concern    None     Social History Narrative    .  Three children. Former smoker, quit 15 yr ago.  Retired , cook and kitchen work.        Current Outpatient Prescriptions   Medication Sig Dispense Refill    albuterol (PROVENTIL) 2.5 mg /3 mL (0.083 %) nebulizer solution Inhale into the lungs.      allopurinol (ZYLOPRIM) 100 MG tablet Take 1 tablet (100 mg total) by mouth once daily. 90 tablet 1    allopurinol (ZYLOPRIM) 100 MG tablet TAKE 1 TABLET BY MOUTH ONCE DAILY 90 tablet 0    blood sugar diagnostic Strp TEST BLOOD SUGARS  TWICE DAILY FOR " TRUMETRIX GLUCOMETER 200 strip 0    blood-glucose meter Misc TEST BLOOD SUGAR AS DIRECTED WITH TRUE METRIX MACHINE 1 each 0    budesonide-formoterol 160-4.5 mcg (SYMBICORT) 160-4.5 mcg/actuation HFAA Inhale into the lungs.      CARTIA  mg Cp24 TAKE ONE CAPSULE BY MOUTH ONCE DAILY 90 capsule 3    cloNIDine (CATAPRES) 0.1 MG tablet Take 1 tablet (0.1 mg total) by mouth 2 (two) times daily. 180 tablet 1    diltiaZEM (CARDIZEM CD) 120 MG Cp24 Take 1 capsule (120 mg total) by mouth once daily. 90 capsule 1    hydroCHLOROthiazide (HYDRODIURIL) 25 MG tablet Take 1 tablet (25 mg total) by mouth once daily. 90 tablet 1    ibuprofen (ADVIL,MOTRIN) 200 MG tablet Take 1 tablet (200 mg total) by mouth every 6 (six) hours as needed.      ipratropium (ATROVENT) 0.02 % nebulizer solution Inhale into the lungs.      lancets (ONETOUCH ULTRASOFT LANCETS) Misc USE WITH TRUE METRIX METER AND TESTS STRIPS twice daily 200 each 0    lisinopril (PRINIVIL,ZESTRIL) 2.5 MG tablet Take 1 tablet (2.5 mg total) by mouth once daily. 90 tablet 1    metFORMIN (GLUCOPHAGE) 500 MG tablet Take 1 tablet (500 mg total) by mouth daily with breakfast. 90 tablet 1    theophylline (THEODUR) 300 MG 12 hr tablet Take 1 tablet (300 mg total) by mouth 2 (two) times daily. 180 tablet 1     No current facility-administered medications for this visit.        Review of patient's allergies indicates:   Allergen Reactions    Tylenol [acetaminophen] Anaphylaxis    Levofloxacin Itching       Review of Systems   Constitution: Negative for chills and fever.   Cardiovascular: Positive for leg swelling. Negative for chest pain and claudication.   Respiratory: Negative for cough and shortness of breath.    Skin: Positive for dry skin, nail changes and suspicious lesions.   Musculoskeletal: Positive for gout (hx), muscle cramps (night cramps), muscle weakness, myalgias and stiffness.        Toenail pain, callus pain   Gastrointestinal: Negative for nausea  and vomiting.   Neurological: Negative for numbness and paresthesias.   Psychiatric/Behavioral: Negative for altered mental status.           Objective:      Physical Exam   Constitutional: She is oriented to person, place, and time. She appears well-developed and well-nourished.   HENT:   Head: Normocephalic.   Cardiovascular: Intact distal pulses.    DP/PT pulses trace.  CRT < 3 sec to tips of toes. 2+ edema noted to b/l LE. ++ spider veins and vericosities noted to b/l LEs.      Pulmonary/Chest: No respiratory distress.   Musculoskeletal:   Gastrocnemius equinus noted to b/l ankles with decreased DF noted on exam. MMT 5/5 in DF/PF/Inv/Ev resistance with no reproduction of pain in any direction. Passive range of motion of ankle and pedal joints is painless. Adequate pedal joint ROM.     Hypermobility noted to 1st ray b/l with near complete collapse of medial longitudinal arch b/l with loading.     Semi-reducible hammertoe contractures noted to toes 2-4 b/l-asymptomatic.     Feet:   Right Foot:   Protective Sensation: 10 sites tested. 10 sites sensed.   Skin Integrity: Positive for callus. Negative for skin breakdown or dry skin.   Left Foot:   Protective Sensation: 10 sites tested. 10 sites sensed.   Skin Integrity: Negative for skin breakdown, callus or dry skin.   Neurological: She is alert and oriented to person, place, and time. She has normal strength. A sensory deficit is present.   Light touch, proprioception, and sharp/dull sensation are all intact bilaterally. Protective threshold with the [place]-Wienstein monofilament is intact bilaterally. Vibratory sensation decreased b/l distal foot.     Skin: Skin is warm, dry and intact. Lesion noted. No bruising and no rash noted. No erythema.   No open lesions, lacerations or wounds noted. Nails are thickened, elongated, discolored yellow/brown with subungual debris and brittleness to R 1-5 and L 1-5. Interdigital spaces clean, dry and intact b/l. No erythema  noted to b/l foot. Skin texture thin, atrophic, dry. Pedal hair diminished. Toes cool to touch b/l    Hyperkeratotic lesion noted to plantar R hallux IPJ, 5th met head R plantar. Skin lines present to lesion/s. No signs of deep tissue injury.      Psychiatric: She has a normal mood and affect. Her behavior is normal. Judgment and thought content normal.   Vitals reviewed.            Assessment:       Encounter Diagnoses   Name Primary?    Controlled type 2 diabetes mellitus without complication, unspecified whether long term insulin use Yes    PVD (peripheral vascular disease)     Onychomycosis due to dermatophyte     Corn or callus     Comprehensive diabetic foot examination, type 2 DM, encounter for          Plan:       Johanna was seen today for diabetic foot exam, diabetes mellitus and nail care.    Diagnoses and all orders for this visit:    Controlled type 2 diabetes mellitus without complication, unspecified whether long term insulin use    PVD (peripheral vascular disease)    Onychomycosis due to dermatophyte    Corn or callus    Comprehensive diabetic foot examination, type 2 DM, encounter for      I counseled the patient on her conditions, their implications and medical management.    - Shoe inspection. Diabetic Foot Education. Patient reminded of the importance of good nutrition and blood sugar control to help prevent podiatric complications of diabetes. Patient instructed on proper foot hygeine. We discussed wearing proper shoe gear, daily foot inspections, never walking without protective shoe gear, caution putting sharp instruments to feet     - Discussed DM foot care:  Wear comfortable, proper fitting shoes. Wash feet daily. Dry well. After drying, apply moisturizer to feet (no lotion to webspaces). Inspect feet daily for skin breaks, blisters, swelling, or redness. Wear cotton socks (preferably white)  Change socks every day. Do NOT walk barefoot. Do NOT use heating pads or warm/hot water soaks      With patient's permission, nails were aggressively reduced and debrided 1,2,3,4, 5 R and 1,2, 3,4,5 L and filed to their soft tissue attachment mechanically and with electric , removing all offending nail and debris.  Patient tolerated this well and no blood was drawn. Patient reports relief following the procedure.     The affected area was cleansed with an alcohol prep pad. Next, utilizing a No.15 scalpel, the hyperkeratotic tissues were trimmed from plantar R hallux IPJ, plantar R 5th met head (2 lesions), down to appropriate level of skin. Care was taken to remove any nucleated core from the center of the lesion. No pinpoint bleeding was encountered. The patient tolerated relief following this procedure.     Discussed regular and routine moisturizer to skin of both feet to help improve dry skin. Advised to apply twice daily until resolution of symptoms. Avoid between toes.     Continue DM shoes. Consider new Rx at next visit.     RTC 3 months, sooner PRN

## 2018-07-09 NOTE — TELEPHONE ENCOUNTER
Spoke with patient with charted results and patient said that she has already seen Vascular and has a 3 months follow up with him and that he also order an Ultra sound of the neck on September 27, 2018.

## 2018-07-09 NOTE — LETTER
July 9, 2018      Johnson Mcintosh MD  [address]  [place] [address]           [facility]  [address]  [place] [address]  Phone: [phone]          Patient: Johanna Mancini   MR Number: [id number]   YOB: 1950   Date of Visit: 7/9/2018       Dear Dr. Johnson BENNETT Page:    Thank you for referring Johanna Mancini to me for evaluation. Attached you will find relevant portions of my assessment and plan of care.    If you have questions, please do not hesitate to call me. I look forward to following Johanna Mancini along with you.    Sincerely,    Ezra Vega, JACKIE    Enclosure  CC:  No Recipients    If you would like to receive this communication electronically, please contact [email or web address] or [phone] to request more information on [facility] [employer] access.    For providers and/or their staff who would like to refer a patient to [facility], please contact us through our one-stop-shop provider referral line, [facility] , at [phone].    If you feel you have received this communication in error or would no longer like to receive these types of communications, please e-mail [email or web address]

## 2018-07-23 DIAGNOSIS — E11.9 TYPE 2 DIABETES MELLITUS WITHOUT COMPLICATION, WITHOUT LONG-TERM CURRENT USE OF INSULIN: ICD-10-CM

## 2018-07-24 RX ORDER — ALBUTEROL SULFATE 0.83 MG/ML
2.5 SOLUTION RESPIRATORY (INHALATION) EVERY 6 HOURS PRN
Qty: 300 ML | Refills: 1 | Status: SHIPPED | OUTPATIENT
Start: 2018-07-24 | End: 2018-09-24 | Stop reason: SDUPTHER

## 2018-08-06 DIAGNOSIS — Z12.39 SCREENING BREAST EXAMINATION: Primary | ICD-10-CM

## 2018-08-20 ENCOUNTER — OFFICE VISIT (OUTPATIENT)
Dept: FAMILY MEDICINE | Facility: CLINIC | Age: 68
End: 2018-08-20
Payer: MEDICARE

## 2018-08-20 VITALS
SYSTOLIC BLOOD PRESSURE: 100 MMHG | BODY MASS INDEX: 41.56 KG/M2 | HEIGHT: 66 IN | HEART RATE: 85 BPM | TEMPERATURE: 98 F | OXYGEN SATURATION: 95 % | DIASTOLIC BLOOD PRESSURE: 60 MMHG | WEIGHT: 258.63 LBS

## 2018-08-20 DIAGNOSIS — E11.9 TYPE 2 DIABETES MELLITUS WITHOUT COMPLICATION, WITHOUT LONG-TERM CURRENT USE OF INSULIN: ICD-10-CM

## 2018-08-20 DIAGNOSIS — I10 HYPERTENSION, ESSENTIAL: ICD-10-CM

## 2018-08-20 DIAGNOSIS — R07.89 CHEST WALL PAIN: Primary | ICD-10-CM

## 2018-08-20 PROCEDURE — 99999 PR PBB SHADOW E&M-EST. PATIENT-LVL III: CPT | Mod: PBBFAC,,, | Performed by: FAMILY MEDICINE

## 2018-08-20 PROCEDURE — 99213 OFFICE O/P EST LOW 20 MIN: CPT | Mod: PBBFAC,PN | Performed by: FAMILY MEDICINE

## 2018-08-20 PROCEDURE — 99214 OFFICE O/P EST MOD 30 MIN: CPT | Mod: S$PBB,,, | Performed by: FAMILY MEDICINE

## 2018-08-20 RX ORDER — ATORVASTATIN CALCIUM 10 MG/1
10 TABLET, FILM COATED ORAL DAILY
Qty: 90 TABLET | Refills: 3 | Status: SHIPPED | OUTPATIENT
Start: 2018-08-20 | End: 2018-09-24 | Stop reason: SDUPTHER

## 2018-08-20 NOTE — PROGRESS NOTES
Routine Office Visit    Patient Name: Johanna Mancini    : 1950  MRN: [id number]    Subjective:  Johanna is a 68 y.o. female who presents today for:    1. Chest pain  Patient presenting 2 weeks after having a single episode of chest pain after getting in an argument with her son.  She states that it felt as if she had her chest tighten up.  The pain was worsened with applying pressure to the area.  There has been some subsequent upper back pain that is cramping in nature as well.  No shortness of breath, sweating, or weakness.  She states she thought about going to the ED, but decided against it.     Past Medical History  Past Medical History:   Diagnosis Date    Articular gout     CHF (congestive heart failure)     COPD (chronic obstructive pulmonary disease)     Diabetes mellitus, type 2     Hypertension     Osteopetrosis     Osteoporosis     Sleep apnea        Past Surgical History  Past Surgical History:   Procedure Laterality Date    CHOLECYSTECTOMY      HYSTERECTOMY      MINERVA equivalent       Family History  Family History   Problem Relation Age of Onset    Heart disease Mother     Cancer Father     Cancer Sister         breast       Social History  Social History     Socioeconomic History    Marital status:      Spouse name: Not on file    Number of children: Not on file    Years of education: Not on file    Highest education level: Not on file   Social Needs    Financial resource strain: Not on file    Food insecurity - worry: Not on file    Food insecurity - inability: Not on file    Transportation needs - medical: Not on file    Transportation needs - non-medical: Not on file   Occupational History    Not on file   Tobacco Use    Smoking status: Former Smoker    Smokeless tobacco: Never Used   Substance and Sexual Activity    Alcohol use: No    Drug use: No    Sexual activity: Not on file   Other Topics Concern    Not on file   Social History Narrative    .  Three  children. Former smoker, quit 15 yr ago.  Retired [employer], [employer] and kitchen work.        Current Medications  Current Outpatient Medications on File Prior to Visit   Medication Sig Dispense Refill    albuterol (PROVENTIL) 2.5 mg /3 mL (0.083 %) nebulizer solution Take 3 mLs (2.5 mg total) by nebulization every 6 (six) hours as needed for Wheezing. 300 mL 1    allopurinol (ZYLOPRIM) 100 MG tablet Take 1 tablet (100 mg total) by mouth once daily. 90 tablet 1    blood sugar diagnostic Strp TEST BLOOD SUGARS  TWICE DAILY FOR TRUMETRIX GLUCOMETER 200 strip 0    blood-glucose meter Misc TEST BLOOD SUGAR AS DIRECTED WITH TRUE METRIX MACHINE 1 each 0    CARTIA  mg Cp24 TAKE ONE CAPSULE BY MOUTH ONCE DAILY 90 capsule 3    cloNIDine (CATAPRES) 0.1 MG tablet Take 1 tablet (0.1 mg total) by mouth 2 (two) times daily. 180 tablet 1    diltiaZEM (CARDIZEM CD) 120 MG Cp24 Take 1 capsule (120 mg total) by mouth once daily. 90 capsule 1    hydroCHLOROthiazide (HYDRODIURIL) 25 MG tablet Take 1 tablet (25 mg total) by mouth once daily. 90 tablet 1    ibuprofen (ADVIL,MOTRIN) 200 MG tablet Take 1 tablet (200 mg total) by mouth every 6 (six) hours as needed.      ipratropium (ATROVENT) 0.02 % nebulizer solution Inhale into the lungs.      lancets (ONETOUCH ULTRASOFT LANCETS) Misc USE WITH TRUE METRIX METER AND TESTS STRIPS twice daily 200 each 0    lisinopril (PRINIVIL,ZESTRIL) 2.5 MG tablet Take 1 tablet (2.5 mg total) by mouth once daily. 90 tablet 1    metFORMIN (GLUCOPHAGE) 500 MG tablet Take 1 tablet (500 mg total) by mouth daily with breakfast. 90 tablet 1    theophylline (THEODUR) 300 MG 12 hr tablet Take 1 tablet (300 mg total) by mouth 2 (two) times daily. 180 tablet 1    budesonide-formoterol 160-4.5 mcg (SYMBICORT) 160-4.5 mcg/actuation HFAA Inhale into the lungs.      [DISCONTINUED] allopurinol (ZYLOPRIM) 100 MG tablet TAKE 1 TABLET BY MOUTH ONCE DAILY 90 tablet 0     No current facility-administered  "medications on file prior to visit.        Allergies   Review of patient's allergies indicates:   Allergen Reactions    Tylenol [acetaminophen] Anaphylaxis    Levofloxacin Itching       Review of Systems (Pertinent positives)  Review of Systems   Constitutional: Negative.    HENT: Negative.    Eyes: Negative.    Respiratory: Negative.    Gastrointestinal: Negative.    Musculoskeletal: Positive for myalgias.   Skin: Negative.          /60 (BP Location: Right arm, Patient Position: Sitting, BP Method: X-Large (Manual))   Pulse 85   Temp 97.6 °F (36.4 °C) (Oral)   Ht 5' 6" (1.676 m)   Wt 117.3 kg (258 lb 9.6 oz)   SpO2 95%   BMI 41.74 kg/m²     GENERAL APPEARANCE: in no apparent distress and well developed and well nourished  HEENT: PERRL, EOMI, Sclera clear, anicteric, Oropharynx clear, no lesions, Neck supple with midline trachea  NECK: normal, supple, no adenopathy, thyroid normal in size  RESPIRATORY: appears well, vitals normal, no respiratory distress, acyanotic, normal RR, chest clear, no wheezing, crepitations, rhonchi, normal symmetric air entry  HEART: regular rate and rhythm, S1, S2 normal, no murmur, click, rub or gallop.    ABDOMEN: abdomen is soft without tenderness, no masses, no hernias, no organomegaly, no rebound, no guarding. Suprapubic tenderness absent. No CVA tenderness.  MS:  Pain no palpation of thoracic paraspinal muscles  SKIN: no rashes, no wounds, no other lesions  PSYCH: Alert, oriented x 3, thought content appropriate, speech normal, pleasant and cooperative, good eye contact, well groomed    Assessment/Plan:  Johanna Mancini is a 68 y.o. female who presents today for :    Johanna was seen today for back pain and chest pain.    Diagnoses and all orders for this visit:    Chest wall pain    Type 2 diabetes mellitus without complication, without long-term current use of insulin  -     atorvastatin (LIPITOR) 10 MG tablet; Take 1 tablet (10 mg total) by mouth once " daily.    Hypertension, essential  -     atorvastatin (LIPITOR) 10 MG tablet; Take 1 tablet (10 mg total) by mouth once daily.      1.  Start on lipitor due to underlying comorbidities  2.  Chest pain was more chest wall pain  3.  Follow up 3 months or sooner  4.  She was told to call 911 for any chest pain that occurs in the future    Johnson Mcintosh MD

## 2018-09-18 ENCOUNTER — HOSPITAL ENCOUNTER (EMERGENCY)
Facility: HOSPITAL | Age: 68
Discharge: HOME | End: 2018-09-18
Attending: EMERGENCY MEDICINE
Payer: MEDICARE

## 2018-09-18 ENCOUNTER — NURSE TRIAGE (OUTPATIENT)
Dept: ADMINISTRATIVE | Facility: CLINIC | Age: 68
End: 2018-09-18

## 2018-09-18 VITALS
HEART RATE: 88 BPM | SYSTOLIC BLOOD PRESSURE: 130 MMHG | HEIGHT: 62 IN | BODY MASS INDEX: 47.11 KG/M2 | DIASTOLIC BLOOD PRESSURE: 62 MMHG | RESPIRATION RATE: 18 BRPM | OXYGEN SATURATION: 96 % | TEMPERATURE: 98 F | WEIGHT: 256 LBS

## 2018-09-18 DIAGNOSIS — K42.9 UMBILICAL HERNIA WITHOUT OBSTRUCTION AND WITHOUT GANGRENE: Primary | ICD-10-CM

## 2018-09-18 PROCEDURE — 99283 EMERGENCY DEPT VISIT LOW MDM: CPT

## 2018-09-18 NOTE — ED TRIAGE NOTES
Pt here for round area to umbilicus. Reports it has been there x6mo or so; over the past 2 weeks it has gotten more swollen and change in color noted. Pt denies N/V/D; passing gas, stool and urine without difficulty.

## 2018-09-18 NOTE — TELEPHONE ENCOUNTER
Reason for Disposition   Patient sounds very sick or weak to the triager    Protocols used: ST ABDOMINAL PAIN - FEMALE-A-OH    Daughter is calling to report Johanna has a swollen belly button.  States it is very large, hard and purple.  Denies any injury to abdomen.  Also reports pain of a 4 while sitting but increases with standing.  Whites of eyes are yellow.  Advised ER now.

## 2018-09-18 NOTE — ED PROVIDER NOTES
Encounter Date: 9/18/2018    SCRIBE #1 NOTE: I, Ray Amos, am scribing for, and in the presence of,  Jos Garsia MD. I have scribed the following portions of the note - Other sections scribed: HPI, ROS, and PE.       History     Chief Complaint   Patient presents with    protruding bellybutton     states he has a hernia.  has noticed belly button protruding and changing colors.  area is about 1 inch.  states itching around belly button.     CC: Protruding Bellybuttom    HPI: This is a 68 y.o. female PMHx HTN, DM, CHF presents with umbilical hernia. She has intermittent itch to the area. No constipation, abdominal pain. She is passing flatus. Has yet to consult a general surgeon. No further symptoms.      The history is provided by the patient. No  was used.     Review of patient's allergies indicates:   Allergen Reactions    Tylenol [acetaminophen] Anaphylaxis    Levofloxacin Itching     Past Medical History:   Diagnosis Date    Articular gout     CHF (congestive heart failure)     COPD (chronic obstructive pulmonary disease)     Diabetes mellitus, type 2     Hypertension     Osteopetrosis     Osteoporosis     Sleep apnea      Past Surgical History:   Procedure Laterality Date    CHOLECYSTECTOMY      HYSTERECTOMY      MINERVA equivalent     Family History   Problem Relation Age of Onset    Heart disease Mother     Cancer Father     Cancer Sister         breast     Social History     Tobacco Use    Smoking status: Former Smoker    Smokeless tobacco: Never Used   Substance Use Topics    Alcohol use: No    Drug use: No     Review of Systems   Constitutional: Negative for chills, diaphoresis and fever.   HENT: Negative for rhinorrhea and sore throat.    Eyes: Negative for visual disturbance.   Respiratory: Negative for cough and shortness of breath.    Cardiovascular: Negative for chest pain.   Gastrointestinal: Negative for abdominal pain, constipation, diarrhea, nausea and  vomiting.   Endocrine: Negative for polyuria.   Genitourinary: Negative for dysuria.   Skin:        (+) umbilical hernia   Neurological: Negative for headaches.   All other systems reviewed and are negative.      Physical Exam     Initial Vitals [09/18/18 1137]   BP Pulse Resp Temp SpO2   (!) 127/58 80 18 97.7 °F (36.5 °C) 97 %      MAP       --         Physical Exam    Nursing note and vitals reviewed.  Constitutional: She is not diaphoretic. She is Obese . No distress.   HENT:   Head: Normocephalic and atraumatic.   Mouth/Throat: Oropharynx is clear and moist.   Eyes: EOM are normal. Pupils are equal, round, and reactive to light. No scleral icterus.   Neck: Normal range of motion. Neck supple. No JVD present.   Cardiovascular: Normal rate and regular rhythm.   Pulmonary/Chest: Breath sounds normal. No stridor. No respiratory distress.   Abdominal: Soft. Bowel sounds are normal. She exhibits no distension. There is no tenderness.   Musculoskeletal: Normal range of motion. She exhibits no edema or tenderness.   Neurological: She is alert and oriented to person, place, and time. She has normal strength. No cranial nerve deficit or sensory deficit.   No cerebellar dysfunction.   Skin: Skin is warm and dry.   Psychiatric: She has a normal mood and affect.         ED Course   Procedures  Labs Reviewed - No data to display       Imaging Results    None                     Scribe Attestation:   Scribe #1: I performed the above scribed service and the documentation accurately describes the services I performed. I attest to the accuracy of the note.    Attending Attestation:           Physician Attestation for Scribe:  Physician Attestation Statement for Scribe #1: I, Jos Garsia MD, reviewed documentation, as scribed by Ray Amos in my presence, and it is both accurate and complete.                 ED Course as of Sep 18 1249   Tue Sep 18, 2018   1245 This is the attending provider  Patient is here complaining of  her umbilical hernia she says that is itching she denies any abdominal pain she has normal bowel habits and is passing gas normally physical exam shows she is obese it is a reducible hernia is mildly protruding from the umbilicus I do not see any evidence of obstruction  I told the patient that really only thing we can offer is to have general surgery do a definitive hernia repair so she said she is going to contact her primary care physician to get that set up  [SA]      ED Course User Index  [SA] Jos Garsia MD     Clinical Impression:   There were no encounter diagnoses.                             Jos Garsia MD  09/19/18 0756

## 2018-09-19 DIAGNOSIS — E11.9 TYPE 2 DIABETES MELLITUS WITHOUT COMPLICATION, WITHOUT LONG-TERM CURRENT USE OF INSULIN: ICD-10-CM

## 2018-09-19 RX ORDER — LANCETS
EACH MISCELLANEOUS
Qty: 200 EACH | Refills: 0 | Status: SHIPPED | OUTPATIENT
Start: 2018-09-19 | End: 2019-01-01

## 2018-09-19 NOTE — TELEPHONE ENCOUNTER
Scheduled hospital f/u for 9/21 and sent request for diabetic supplies.    PT.USES TRUE METRIX STRIPS AND LANCETS TWICE A DAY.

## 2018-09-19 NOTE — TELEPHONE ENCOUNTER
----- Message from Jess Knightcarmenosbaldo sent at 9/18/2018  1:59 PM CDT -----  Contact: self / [phone]  Pt is asking for a call back to give nurse the information she requested for her diabetic meter and to also relay some personal information.

## 2018-09-20 ENCOUNTER — TELEPHONE (OUTPATIENT)
Dept: FAMILY MEDICINE | Facility: CLINIC | Age: 68
End: 2018-09-20

## 2018-09-20 NOTE — TELEPHONE ENCOUNTER
----- Message from Dalila Alcaraz sent at 9/19/2018  4:32 PM CDT -----  Contact: Nora thompson is calling to speak with staff regarding directions. Please call [phone].

## 2018-09-21 DIAGNOSIS — I10 HYPERTENSION, ESSENTIAL: ICD-10-CM

## 2018-09-21 DIAGNOSIS — E11.9 TYPE 2 DIABETES MELLITUS WITHOUT COMPLICATION, WITHOUT LONG-TERM CURRENT USE OF INSULIN: ICD-10-CM

## 2018-09-24 ENCOUNTER — OFFICE VISIT (OUTPATIENT)
Dept: FAMILY MEDICINE | Facility: CLINIC | Age: 68
End: 2018-09-24
Payer: MEDICARE

## 2018-09-24 ENCOUNTER — TELEPHONE (OUTPATIENT)
Dept: FAMILY MEDICINE | Facility: CLINIC | Age: 68
End: 2018-09-24

## 2018-09-24 VITALS
DIASTOLIC BLOOD PRESSURE: 68 MMHG | RESPIRATION RATE: 20 BRPM | TEMPERATURE: 98 F | WEIGHT: 256.81 LBS | HEART RATE: 75 BPM | HEIGHT: 62 IN | BODY MASS INDEX: 47.26 KG/M2 | SYSTOLIC BLOOD PRESSURE: 124 MMHG | OXYGEN SATURATION: 92 %

## 2018-09-24 DIAGNOSIS — K42.9 UMBILICAL HERNIA WITHOUT OBSTRUCTION AND WITHOUT GANGRENE: ICD-10-CM

## 2018-09-24 DIAGNOSIS — M1A.09X0 IDIOPATHIC CHRONIC GOUT OF MULTIPLE SITES WITHOUT TOPHUS: ICD-10-CM

## 2018-09-24 DIAGNOSIS — E11.9 TYPE 2 DIABETES MELLITUS WITHOUT COMPLICATION, WITHOUT LONG-TERM CURRENT USE OF INSULIN: ICD-10-CM

## 2018-09-24 DIAGNOSIS — J43.1 PANLOBULAR EMPHYSEMA: ICD-10-CM

## 2018-09-24 DIAGNOSIS — I10 HYPERTENSION, ESSENTIAL: ICD-10-CM

## 2018-09-24 PROCEDURE — 99214 OFFICE O/P EST MOD 30 MIN: CPT | Mod: S$PBB,,, | Performed by: FAMILY MEDICINE

## 2018-09-24 PROCEDURE — 99999 PR PBB SHADOW E&M-EST. PATIENT-LVL III: CPT | Mod: PBBFAC,,, | Performed by: FAMILY MEDICINE

## 2018-09-24 PROCEDURE — 99213 OFFICE O/P EST LOW 20 MIN: CPT | Mod: PBBFAC,PN | Performed by: FAMILY MEDICINE

## 2018-09-24 RX ORDER — METFORMIN HYDROCHLORIDE 500 MG/1
TABLET ORAL
Qty: 90 TABLET | Refills: 0 | Status: CANCELLED | OUTPATIENT
Start: 2018-09-24

## 2018-09-24 RX ORDER — LISINOPRIL 2.5 MG/1
TABLET ORAL
Qty: 90 TABLET | Refills: 0 | Status: SHIPPED | OUTPATIENT
Start: 2018-09-24 | End: 2018-12-14 | Stop reason: SDUPTHER

## 2018-09-24 RX ORDER — DILTIAZEM HYDROCHLORIDE 120 MG/1
120 CAPSULE, COATED, EXTENDED RELEASE ORAL DAILY
Qty: 90 CAPSULE | Refills: 3 | Status: CANCELLED | OUTPATIENT
Start: 2018-09-24

## 2018-09-24 RX ORDER — HYDROCHLOROTHIAZIDE 25 MG/1
TABLET ORAL
Qty: 90 TABLET | Refills: 0 | Status: SHIPPED | OUTPATIENT
Start: 2018-09-24 | End: 2018-12-14 | Stop reason: SDUPTHER

## 2018-09-24 RX ORDER — METFORMIN HYDROCHLORIDE 500 MG/1
TABLET ORAL
Qty: 90 TABLET | Refills: 0 | Status: SHIPPED | OUTPATIENT
Start: 2018-09-24 | End: 2018-12-14 | Stop reason: SDUPTHER

## 2018-09-24 RX ORDER — ALLOPURINOL 100 MG/1
100 TABLET ORAL DAILY
Qty: 90 TABLET | Refills: 1 | Status: SHIPPED | OUTPATIENT
Start: 2018-09-24 | End: 2019-03-04 | Stop reason: SDUPTHER

## 2018-09-24 RX ORDER — ATORVASTATIN CALCIUM 10 MG/1
10 TABLET, FILM COATED ORAL DAILY
Qty: 90 TABLET | Refills: 3 | Status: SHIPPED | OUTPATIENT
Start: 2018-09-24 | End: 2018-10-08 | Stop reason: SDUPTHER

## 2018-09-24 RX ORDER — ALBUTEROL SULFATE 0.83 MG/ML
2.5 SOLUTION RESPIRATORY (INHALATION) EVERY 6 HOURS PRN
Qty: 300 ML | Refills: 1 | Status: SHIPPED | OUTPATIENT
Start: 2018-09-24 | End: 2019-01-01 | Stop reason: SDUPTHER

## 2018-09-24 RX ORDER — HYDROCHLOROTHIAZIDE 25 MG/1
TABLET ORAL
Qty: 90 TABLET | Refills: 0 | Status: CANCELLED | OUTPATIENT
Start: 2018-09-24

## 2018-09-24 RX ORDER — LISINOPRIL 2.5 MG/1
TABLET ORAL
Qty: 90 TABLET | Refills: 0 | Status: CANCELLED | OUTPATIENT
Start: 2018-09-24

## 2018-09-24 RX ORDER — CLONIDINE HYDROCHLORIDE 0.1 MG/1
TABLET ORAL
Qty: 180 TABLET | Refills: 0 | Status: SHIPPED | OUTPATIENT
Start: 2018-09-24 | End: 2018-12-14 | Stop reason: SDUPTHER

## 2018-09-24 NOTE — PROGRESS NOTES
Routine Office Visit    Patient Name: Johanna Mancini    : 1950  MRN: [id number]    Subjective:  Johanna is a 68 y.o. female who presents today for:    1. Hernia  Patient presenting today for evaluation of umbilical hernia.  She states that it irritates her mainly at night, but no pain.  No changes in bowel habits.  She is still able to have BM's and pass gas.  She states that the ED doctor told her she would likely need surgery.      Past Medical History  Past Medical History:   Diagnosis Date    Articular gout     CHF (congestive heart failure)     COPD (chronic obstructive pulmonary disease)     Diabetes mellitus, type 2     Hypertension     Osteopetrosis     Osteoporosis     Sleep apnea        Past Surgical History  Past Surgical History:   Procedure Laterality Date    CHOLECYSTECTOMY      HYSTERECTOMY      MINERVA equivalent       Family History  Family History   Problem Relation Age of Onset    Heart disease Mother     Cancer Father     Cancer Sister         breast       Social History  Social History     Socioeconomic History    Marital status:      Spouse name: Not on file    Number of children: Not on file    Years of education: Not on file    Highest education level: Not on file   Social Needs    Financial resource strain: Not on file    Food insecurity - worry: Not on file    Food insecurity - inability: Not on file    Transportation needs - medical: Not on file    Transportation needs - non-medical: Not on file   Occupational History    Not on file   Tobacco Use    Smoking status: Former Smoker    Smokeless tobacco: Never Used   Substance and Sexual Activity    Alcohol use: No    Drug use: No    Sexual activity: Not on file   Other Topics Concern    Not on file   Social History Narrative    .  Three children. Former smoker, quit 15 yr ago.  Retired , cook and kitchen work.        Current Medications  Current Outpatient Medications on File Prior to Visit    Medication Sig Dispense Refill    blood sugar diagnostic Strp TEST BLOOD SUGARS  TWICE DAILY FOR TRUMETRIX GLUCOMETER 200 strip 0    blood-glucose meter Misc TEST BLOOD SUGAR AS DIRECTED WITH TRUE METRIX MACHINE 1 each 0    budesonide-formoterol 160-4.5 mcg (SYMBICORT) 160-4.5 mcg/actuation HFAA Inhale into the lungs.      CARTIA  mg Cp24 TAKE ONE CAPSULE BY MOUTH ONCE DAILY 90 capsule 3    cloNIDine (CATAPRES) 0.1 MG tablet TAKE 1 TABLET(0.1 MG) BY MOUTH TWICE DAILY 180 tablet 0    diltiaZEM (CARDIZEM CD) 120 MG Cp24 Take 1 capsule (120 mg total) by mouth once daily. 90 capsule 1    hydroCHLOROthiazide (HYDRODIURIL) 25 MG tablet TAKE 1 TABLET(25 MG) BY MOUTH EVERY DAY 90 tablet 0    ibuprofen (ADVIL,MOTRIN) 200 MG tablet Take 1 tablet (200 mg total) by mouth every 6 (six) hours as needed.      ipratropium (ATROVENT) 0.02 % nebulizer solution Inhale into the lungs.      lancets (ONETOUCH ULTRASOFT LANCETS) Misc USE WITH TRUE METRIX METER AND TESTS STRIPS twice daily 200 each 0    lisinopril (PRINIVIL,ZESTRIL) 2.5 MG tablet TAKE 1 TABLET(2.5 MG) BY MOUTH EVERY DAY 90 tablet 0    metFORMIN (GLUCOPHAGE) 500 MG tablet TAKE 1 TABLET(500 MG) BY MOUTH DAILY WITH BREAKFAST 90 tablet 0    theophylline (THEODUR) 300 MG 12 hr tablet Take 1 tablet (300 mg total) by mouth 2 (two) times daily. 180 tablet 1    [DISCONTINUED] albuterol (PROVENTIL) 2.5 mg /3 mL (0.083 %) nebulizer solution Take 3 mLs (2.5 mg total) by nebulization every 6 (six) hours as needed for Wheezing. 300 mL 1    [DISCONTINUED] allopurinol (ZYLOPRIM) 100 MG tablet Take 1 tablet (100 mg total) by mouth once daily. 90 tablet 1    [DISCONTINUED] atorvastatin (LIPITOR) 10 MG tablet Take 1 tablet (10 mg total) by mouth once daily. 90 tablet 3     No current facility-administered medications on file prior to visit.        Allergies   Review of patient's allergies indicates:   Allergen Reactions    Tylenol [acetaminophen] Anaphylaxis     "Levofloxacin Itching       Review of Systems (Pertinent positives)  Review of Systems   Constitutional: Negative.    HENT: Negative.    Eyes: Negative.    Respiratory: Negative.    Cardiovascular: Negative.    Gastrointestinal: Positive for abdominal pain.   Musculoskeletal: Negative.    Skin: Negative.          /68 (BP Location: Right arm, Patient Position: Sitting, BP Method: Medium (Automatic))   Pulse 75   Temp 97.9 °F (36.6 °C) (Oral)   Resp 20   Ht 5' 2" (1.575 m)   Wt 116.5 kg (256 lb 13.4 oz)   SpO2 (!) 92%   BMI 46.98 kg/m²     GENERAL APPEARANCE: in no apparent distress and well developed and well nourished  HEENT: PERRL, EOMI, Sclera clear, anicteric, Oropharynx clear, no lesions, Neck supple with midline trachea  NECK: normal, supple, no adenopathy, thyroid normal in size  RESPIRATORY: appears well, vitals normal, no respiratory distress, acyanotic, normal RR, chest clear, no wheezing, crepitations, rhonchi, normal symmetric air entry  HEART: regular rate and rhythm, S1, S2 normal, no murmur, click, rub or gallop.    ABDOMEN: abdomen is soft without tenderness, + umbilical hernias, no organomegaly, no rebound, no guarding. Suprapubic tenderness absent. No CVA tenderness.  SKIN: no rashes, no wounds, no other lesions  PSYCH: Alert, oriented x 3, thought content appropriate, speech normal, pleasant and cooperative, good eye contact, well groomed    Assessment/Plan:  Johanna Mancini is a 68 y.o. female who presents today for :    Johanna was seen today for umbilical hernia.    Diagnoses and all orders for this visit:    Umbilical hernia without obstruction and without gangrene      Hypertension, essential  -     atorvastatin (LIPITOR) 10 MG tablet; Take 1 tablet (10 mg total) by mouth once daily.    Panlobular emphysema    Type 2 diabetes mellitus without complication, without long-term current use of insulin  -     atorvastatin (LIPITOR) 10 MG tablet; Take 1 tablet (10 mg total) by mouth once " daily.    Idiopathic chronic gout of multiple sites without tophus  -     allopurinol (ZYLOPRIM) 100 MG tablet; Take 1 tablet (100 mg total) by mouth once daily.      1.  Patient is high risk for surgery given her severe COPD and her diabetes (although controlled)  2.  Medications refilled  3.  Patient declines surgical eval at this time  4.  She is to go to the ED for any worsening symptoms and was told that the longer she waits the more difficulat the surgery will be        Johnson Mcintosh MD

## 2018-09-27 ENCOUNTER — HOSPITAL ENCOUNTER (OUTPATIENT)
Dept: CARDIOLOGY | Facility: HOSPITAL | Age: 68
Discharge: HOME | End: 2018-09-27
Attending: SURGERY
Payer: MEDICARE

## 2018-09-27 ENCOUNTER — OFFICE VISIT (OUTPATIENT)
Dept: VASCULAR SURGERY | Facility: CLINIC | Age: 68
End: 2018-09-27
Payer: MEDICARE

## 2018-09-27 VITALS
DIASTOLIC BLOOD PRESSURE: 64 MMHG | HEIGHT: 62 IN | BODY MASS INDEX: 46.7 KG/M2 | WEIGHT: 253.75 LBS | SYSTOLIC BLOOD PRESSURE: 122 MMHG | HEART RATE: 72 BPM

## 2018-09-27 DIAGNOSIS — I87.2 VENOUS INSUFFICIENCY OF BOTH LOWER EXTREMITIES: Primary | ICD-10-CM

## 2018-09-27 DIAGNOSIS — Z01.818 PRE-OP EVALUATION: Primary | ICD-10-CM

## 2018-09-27 DIAGNOSIS — R09.89 LEFT CAROTID BRUIT: ICD-10-CM

## 2018-09-27 PROCEDURE — 93880 EXTRACRANIAL BILAT STUDY: CPT | Mod: 26,,, | Performed by: SURGERY

## 2018-09-27 PROCEDURE — 93880 EXTRACRANIAL BILAT STUDY: CPT

## 2018-09-27 PROCEDURE — 99214 OFFICE O/P EST MOD 30 MIN: CPT | Mod: S$PBB,,, | Performed by: SURGERY

## 2018-09-27 PROCEDURE — 99999 PR PBB SHADOW E&M-EST. PATIENT-LVL III: CPT | Mod: PBBFAC,,, | Performed by: SURGERY

## 2018-09-27 PROCEDURE — 99213 OFFICE O/P EST LOW 20 MIN: CPT | Mod: PBBFAC | Performed by: SURGERY

## 2018-09-27 NOTE — PATIENT INSTRUCTIONS
Having Carotid Endarterectomy     A skin incision is made over the carotid artery.     Endarterectomy is the removal of plaque from the carotid artery through an incision in the neck. This surgery has a low risk of stroke or complication (1% to 3%). It typically involves a quick recovery with little pain. You may be asleep under general anesthesia during surgery, or awake with local anesthesia to control pain. This will be discussed with you before surgery.  How the endarterectomy is performed  1. Make the skin incision. The surgeon makes an incision in the skin over the carotid artery. The image above shows a common incision site and length.  2. Open the artery. The surgeon places clamps on the artery above and below the blockage. This temporarily stops blood flow. The brain receives blood from the carotid artery on the other side of your neck. The surgeon then makes an incision in the artery itself.  3. Place shunt. A shunt may be used to preserve blood flow to the brain during the procedure. After the shunt is in place, the clamps are removed from the internal carotid artery. In some cases a shunt is not needed because the brain is receiving enough blood through other arteries (from the carotid artery on the other side of your neck).   4. Remove plaque. The surgeon loosens plaque from the artery wall. The plaque is then removed, often in a single piece. The surgeon inspects the artery to confirm that all of the plaque has been removed.  5.      A shunt helps keep blood flowing during the procedure.     Close the incision. The surgeon closes the incision using either sutures or a patch. The clamps are then removed. Next, the skin incision is sutured closed. A tube or drain may be put in place to keep fluids from collecting around the area.  The surgery usually takes around 2 hours, but may be longer depending on the anesthetic and your situation.  Date Last Reviewed: 6/8/2015  © 8308-0327 [employer] [address] [place] [address]. All rights reserved. This information is not intended as a substitute for professional medical care. Always follow your healthcare professional's instructions.

## 2018-09-27 NOTE — LETTER
September 27, 2018        Johnson Mcintosh MD  [address] [facility] [address]             [facility]  [address]  [facility] [address]  Phone: [phone]  Fax: [phone]   Patient: Johanna Mancini   MR Number: [id number]   YOB: 1950   Date of Visit: 9/27/2018       Dear Dr. Mcintosh:    Thank you for referring Johanna Mancini to me for evaluation. Below are the relevant portions of my assessment and plan of care.            If you have questions, please do not hesitate to call me. I look forward to following Johanna along with you.    Sincerely,      Leoncio Shaver MD           CC  No Recipients

## 2018-09-27 NOTE — TELEPHONE ENCOUNTER
----- Message from Jess Khan sent at 9/27/2018  2:00 PM CDT -----  Contact: Awilda with [facility]  - [phone]  Rep states the Diagnosis code was not put on the CMS  Form they received on 09/21 along with no signature or date. Please update and resend.

## 2018-09-27 NOTE — PROGRESS NOTES
Leoncio Shaver MD VI                       [facility]                         09/27/2018    HPI:  Johanna Mancini is a 68 y.o. female with   Patient Active Problem List   Diagnosis    Varicose veins of both lower extremities with pain    COPD (chronic obstructive pulmonary disease)    Gout    Hypoxemia requiring supplemental oxygen    Hypertension, essential    Type 2 diabetes mellitus without complication, without long-term current use of insulin    Hemoglobin A1c less than 7.0%    Idiopathic chronic gout of multiple sites without tophus    Age-related osteoporosis without current pathological fracture    Umbilical hernia without obstruction and without gangrene    being managed by PCP and specialists who is here today for evaluation of BLE varicose veins.  Patient states location is lower aspect of legs bilaterally occurring for many years.  Associated signs and symptoms include bleeding.  Has R posterior calf pain.  Quality is sharp and severity is 5/10.  Symptoms began several years ago.  Alleviating factors include elevation.  Worsening factors include dependency.    no MI  no Stroke  Tobacco use: quit 20 yrs ago    Interval history: Cont to experience BLE edema, not compliant with compression due to pain.  On home O2 not fully compliant.  PARIKH at 1/2 block.  No chest pain.  Minimal elevation.  No TIA, CVA or new neuro symptoms.    Past Medical History:   Diagnosis Date    Articular gout     CHF (congestive heart failure)     COPD (chronic obstructive pulmonary disease)     Diabetes mellitus, type 2     Hypertension     Osteopetrosis     Osteoporosis     Sleep apnea      Past Surgical History:   Procedure Laterality Date    CHOLECYSTECTOMY      HYSTERECTOMY      MINERVA equivalent     Family History   Problem Relation Age of Onset    Heart disease Mother     Cancer Father     Cancer Sister         breast     Social History     Socioeconomic History    Marital  status:      Spouse name: Not on file    Number of children: Not on file    Years of education: Not on file    Highest education level: Not on file   Social Needs    Financial resource strain: Not on file    Food insecurity - worry: Not on file    Food insecurity - inability: Not on file    Transportation needs - medical: Not on file    Transportation needs - non-medical: Not on file   Occupational History    Not on file   Tobacco Use    Smoking status: Former Smoker    Smokeless tobacco: Never Used   Substance and Sexual Activity    Alcohol use: No    Drug use: No    Sexual activity: Not on file   Other Topics Concern    Not on file   Social History Narrative    .  Three children. Former smoker, quit 15 yr ago.  Retired [employer], [employer] and kitchen work.        Current Outpatient Medications:     albuterol (PROVENTIL) 2.5 mg /3 mL (0.083 %) nebulizer solution, Take 3 mLs (2.5 mg total) by nebulization every 6 (six) hours as needed for Wheezing., Disp: 300 mL, Rfl: 1    allopurinol (ZYLOPRIM) 100 MG tablet, Take 1 tablet (100 mg total) by mouth once daily., Disp: 90 tablet, Rfl: 1    atorvastatin (LIPITOR) 10 MG tablet, Take 1 tablet (10 mg total) by mouth once daily., Disp: 90 tablet, Rfl: 3    budesonide-formoterol 160-4.5 mcg (SYMBICORT) 160-4.5 mcg/actuation HFAA, Inhale into the lungs., Disp: , Rfl:     cloNIDine (CATAPRES) 0.1 MG tablet, TAKE 1 TABLET(0.1 MG) BY MOUTH TWICE DAILY, Disp: 180 tablet, Rfl: 0    diltiaZEM (CARDIZEM CD) 120 MG Cp24, Take 1 capsule (120 mg total) by mouth once daily., Disp: 90 capsule, Rfl: 1    hydroCHLOROthiazide (HYDRODIURIL) 25 MG tablet, TAKE 1 TABLET(25 MG) BY MOUTH EVERY DAY, Disp: 90 tablet, Rfl: 0    ipratropium (ATROVENT) 0.02 % nebulizer solution, Inhale into the lungs., Disp: , Rfl:     lisinopril (PRINIVIL,ZESTRIL) 2.5 MG tablet, TAKE 1 TABLET(2.5 MG) BY MOUTH EVERY DAY, Disp: 90 tablet, Rfl: 0    metFORMIN (GLUCOPHAGE) 500 MG tablet,  TAKE 1 TABLET(500 MG) BY MOUTH DAILY WITH BREAKFAST, Disp: 90 tablet, Rfl: 0    theophylline (THEODUR) 300 MG 12 hr tablet, Take 1 tablet (300 mg total) by mouth 2 (two) times daily., Disp: 180 tablet, Rfl: 1    blood sugar diagnostic Strp, TEST BLOOD SUGARS  TWICE DAILY FOR TRUMETRIX GLUCOMETER, Disp: 200 strip, Rfl: 0    blood-glucose meter Misc, TEST BLOOD SUGAR AS DIRECTED WITH TRUE METRIX MACHINE, Disp: 1 each, Rfl: 0    CARTIA  mg Cp24, TAKE ONE CAPSULE BY MOUTH ONCE DAILY, Disp: 90 capsule, Rfl: 3    ibuprofen (ADVIL,MOTRIN) 200 MG tablet, Take 1 tablet (200 mg total) by mouth every 6 (six) hours as needed., Disp: , Rfl:     lancets (ONETOUCH ULTRASOFT LANCETS) Misc, USE WITH TRUE METRIX METER AND TESTS STRIPS twice daily, Disp: 200 each, Rfl: 0    REVIEW OF SYSTEMS:  General: No fevers or chills; ENT: No sore throat; Allergy and Immunology: no persistent infections; Hematological and Lymphatic: No history of bleeding or easy bruising; Endocrine: negative; Respiratory: no cough, shortness of breath, or wheezing; Cardiovascular: no chest pain or dyspnea on exertion; Gastrointestinal: no abdominal pain/back, change in bowel habits, or bloody stools; Genito-Urinary: no dysuria, trouble voiding, or hematuria; Musculoskeletal: negative; Neurological: no TIA or stroke symptoms; Psychiatric: no nervousness, anxiety or depression.    PHYSICAL EXAM:      Pulse: 72         General appearance:  Alert, well-appearing, and in no distress.  Oriented to person, place, and time                    Neurological: Normal speech, no focal findings noted; CN II - XII grossly intact. RLE with sensation to light touch, LLE with sensation to light touch.            Musculoskeletal: Digits/nail without cyanosis/clubbing.  Strength 5/5 BLE.                    Neck: Supple, no significant adenopathy, L carotid bruit can be auscultated                  Chest:  Clear to auscultation, no wheezes, rales or rhonchi, symmetric air  entry. No use of accessory muscles               Cardiac: Normal rate and regular rhythm, S1 and S2 normal            Abdomen: Soft, nontender, nondistended, no masses or organomegaly, no hernia     No rebound tenderness noted; bowel sounds normal     No groin adenopathy      Extremities:   2+ R femoral pulse, 2+ L femoral pulse     1+ R popliteal pulse, 1+ L popliteal pulse     1+ R PT pulse, 1+ L PT pulse     1+ R DP pulse, 1+ L DP pulse     2+ RLE edema, 2+ LLE edema    Skin: BLE with varicose veins and spider veins    LAB RESULTS:  No results found for: CBC  Lab Results   Component Value Date    LABPROT 10.7 12/17/2011    INR 1.0 12/17/2011     Lab Results   Component Value Date     01/25/2018    K 3.8 01/25/2018    CL 97 01/25/2018    CO2 30 (H) 01/25/2018     01/25/2018    BUN 18 01/25/2018    CREATININE 0.8 01/25/2018    CALCIUM 9.9 01/25/2018    ANIONGAP 14 01/25/2018    EGFRNONAA >60 01/25/2018     Lab Results   Component Value Date    WBC 5.85 01/25/2018    RBC 4.86 01/25/2018    HGB 13.7 01/25/2018    HCT 43.3 01/25/2018    MCV 89 01/25/2018    MCH 28.2 01/25/2018    MCHC 31.6 (L) 01/25/2018    RDW 13.7 01/25/2018     01/25/2018    MPV 12.2 01/25/2018    GRAN 44.0 01/25/2018    LYMPH 40.0 01/25/2018    MONO 7.0 01/25/2018    EOS 0.2 06/27/2014    BASO 0.01 06/27/2014    EOSINOPHIL 3.0 01/25/2018    BASOPHIL 0.0 01/25/2018    DIFFMETHOD Automated 01/25/2018     .  Lab Results   Component Value Date    HGBA1C 6.1 (H) 05/15/2018       IMAGING:  All pertinent imaging has been reviewed and interpreted independently.    Bilateral GSV reflux.  No DVT.    Carotid US 9/27/18: R 80-99% stenosis, L 60-79% stenosis    IMP/PLAN:  68 y.o. female with   Patient Active Problem List   Diagnosis    Varicose veins of both lower extremities with pain    COPD (chronic obstructive pulmonary disease)    Gout    Hypoxemia requiring supplemental oxygen    Hypertension, essential    Type 2 diabetes  mellitus without complication, without long-term current use of insulin    Hemoglobin A1c less than 7.0%    Idiopathic chronic gout of multiple sites without tophus    Age-related osteoporosis without current pathological fracture    Umbilical hernia without obstruction and without gangrene    being managed by PCP and specialists who is here today for evaluation of BLE edema and varicose veins.    -Asymptomatic carotid occlusive disease, bilateral R/L - rec R CEA after evaluation by Cardiology and Pulmonary for preop risk stratification  -Needs increase in Atorvastatin to 80 mg daily  -non compliance with medical therapy for venous insufficiency - recommend compliance compression with Rx stockings (new Rx given today), elevation, dietary changes associated with water and sodium intake discussed at length with patient  -RTC 4-6 weeks after preop evaluations    I spent 30 minutes evaluating this patient and greater than 50% of the time was spent counseling, coordinator care and discussing the plan of care.  All questions were answered and patient stated understanding with agreement with the above treatment plan.    Leoncio Shaver MD [facility]  Vascular and Endovascular Surgery

## 2018-09-28 LAB — INTERNAL CAROTID STENOSIS: ABNORMAL

## 2018-10-02 ENCOUNTER — TELEPHONE (OUTPATIENT)
Dept: VASCULAR SURGERY | Facility: CLINIC | Age: 68
End: 2018-10-02

## 2018-10-02 NOTE — TELEPHONE ENCOUNTER
Spoke to Ms. Addis and explained that her appointments at [facility] pulmonology were made. Explained that the date and time of first available test was not until the end of the month. Gave her date and times of appointment at [facility] and phone number to their office for directions as not sure of the office. Explained would make appointment with our office after Nov. 7th and mail that out to her. Explained to be sure to receive a clearance letter from the pulmonologist and bring to her next appointment. She stated understanding.

## 2018-10-03 ENCOUNTER — TELEPHONE (OUTPATIENT)
Dept: PODIATRY | Facility: CLINIC | Age: 68
End: 2018-10-03

## 2018-10-03 NOTE — TELEPHONE ENCOUNTER
Spoke with patient. States that she has order for compression stockings from. Advised to contact Medicare to find vendor that she can get 20-30 mm Hg compression from.     Or may be able to order online

## 2018-10-03 NOTE — TELEPHONE ENCOUNTER
----- Message from Tarah Lisa sent at 10/3/2018 10:08 AM CDT -----  Contact: Self/ [phone]  Pt requesting call from office. Wants to know if office sells compression stockings. Thank you.

## 2018-10-08 ENCOUNTER — OFFICE VISIT (OUTPATIENT)
Dept: CARDIOLOGY | Facility: CLINIC | Age: 68
End: 2018-10-08
Payer: MEDICARE

## 2018-10-08 VITALS
HEART RATE: 94 BPM | WEIGHT: 255 LBS | RESPIRATION RATE: 15 BRPM | HEIGHT: 62 IN | DIASTOLIC BLOOD PRESSURE: 62 MMHG | SYSTOLIC BLOOD PRESSURE: 108 MMHG | OXYGEN SATURATION: 93 % | BODY MASS INDEX: 46.93 KG/M2

## 2018-10-08 DIAGNOSIS — E78.2 MIXED HYPERLIPIDEMIA: ICD-10-CM

## 2018-10-08 DIAGNOSIS — I83.813 VARICOSE VEINS OF BOTH LOWER EXTREMITIES WITH PAIN: ICD-10-CM

## 2018-10-08 DIAGNOSIS — J43.1 PANLOBULAR EMPHYSEMA: ICD-10-CM

## 2018-10-08 DIAGNOSIS — Z01.810 PREOP CARDIOVASCULAR EXAM: Primary | ICD-10-CM

## 2018-10-08 DIAGNOSIS — R07.9 CHEST PAIN, UNSPECIFIED TYPE: ICD-10-CM

## 2018-10-08 DIAGNOSIS — E66.01 MORBID OBESITY, UNSPECIFIED OBESITY TYPE: ICD-10-CM

## 2018-10-08 DIAGNOSIS — I10 HYPERTENSION, ESSENTIAL: ICD-10-CM

## 2018-10-08 DIAGNOSIS — Z99.81 HYPOXEMIA REQUIRING SUPPLEMENTAL OXYGEN: ICD-10-CM

## 2018-10-08 DIAGNOSIS — R09.02 HYPOXEMIA REQUIRING SUPPLEMENTAL OXYGEN: ICD-10-CM

## 2018-10-08 DIAGNOSIS — E11.9 TYPE 2 DIABETES MELLITUS WITHOUT COMPLICATION, WITHOUT LONG-TERM CURRENT USE OF INSULIN: ICD-10-CM

## 2018-10-08 PROCEDURE — 93010 ELECTROCARDIOGRAM REPORT: CPT | Mod: S$PBB,,, | Performed by: INTERNAL MEDICINE

## 2018-10-08 PROCEDURE — 93005 ELECTROCARDIOGRAM TRACING: CPT | Mod: PBBFAC | Performed by: INTERNAL MEDICINE

## 2018-10-08 PROCEDURE — 99213 OFFICE O/P EST LOW 20 MIN: CPT | Mod: PBBFAC | Performed by: INTERNAL MEDICINE

## 2018-10-08 PROCEDURE — 99204 OFFICE O/P NEW MOD 45 MIN: CPT | Mod: S$PBB,,, | Performed by: INTERNAL MEDICINE

## 2018-10-08 PROCEDURE — 99999 PR PBB SHADOW E&M-EST. PATIENT-LVL III: CPT | Mod: PBBFAC,,, | Performed by: INTERNAL MEDICINE

## 2018-10-08 RX ORDER — ASPIRIN 81 MG/1
81 TABLET ORAL DAILY
Qty: 90 TABLET | Refills: 3 | COMMUNITY
Start: 2018-10-08 | End: 2019-01-01

## 2018-10-08 RX ORDER — ATORVASTATIN CALCIUM 40 MG/1
40 TABLET, FILM COATED ORAL DAILY
Qty: 90 TABLET | Refills: 3 | Status: SHIPPED | OUTPATIENT
Start: 2018-10-08 | End: 2019-03-14 | Stop reason: SDUPTHER

## 2018-10-08 NOTE — PROGRESS NOTES
CARDIOVASCULAR CONSULTATION    REASON FOR CONSULT:   Johanna Mancini is a 68 y.o. female who presents for preop CV eval.    Req/Vasc: Sivakumar  PCP: Page  HISTORY OF PRESENT ILLNESS:   The patient is a very pleasant 68-year-old  woman referred at the request of her vascular surgeon for preoperative cardiac risk stratification.  The patient is due to undergo right carotid endarterectomy for severe right carotid stenosis.  She denies any history of stroke or TIA.  She does describe a history of chest discomfort with exertion.  She does have chronic dyspnea on exertion and wears oxygen chronically.  She has had no palpitations, lightheadedness, dizziness, or syncope.  There has been no PND, orthopnea, or lower extremity edema. She denies melena, hematuria, or claudicant symptoms.    Family history is notable for mother with myocardial infarction in her 50s.    The patient is a former smoker.    CARDIOVASCULAR HISTORY:   Carotid stenosis R>L  CVI    PAST MEDICAL HISTORY:     Past Medical History:   Diagnosis Date    Articular gout     CHF (congestive heart failure)     COPD (chronic obstructive pulmonary disease)     Diabetes mellitus, type 2     Hypertension     Osteopetrosis     Osteoporosis     Sleep apnea        PAST SURGICAL HISTORY:     Past Surgical History:   Procedure Laterality Date    CHOLECYSTECTOMY      HYSTERECTOMY      MINERVA equivalent       ALLERGIES AND MEDICATION:     Review of patient's allergies indicates:   Allergen Reactions    Tylenol [acetaminophen] Anaphylaxis    Levofloxacin Itching        Medication List           Accurate as of 10/8/18  1:44 PM. If you have any questions, ask your nurse or doctor.               CONTINUE taking these medications    albuterol 2.5 mg /3 mL (0.083 %) nebulizer solution  Commonly known as:  PROVENTIL  Take 3 mLs (2.5 mg total) by nebulization every 6 (six) hours as needed for Wheezing.     allopurinol 100 MG tablet  Commonly known as:   ZYLOPRIM  Take 1 tablet (100 mg total) by mouth once daily.     atorvastatin 10 MG tablet  Commonly known as:  LIPITOR  Take 1 tablet (10 mg total) by mouth once daily.     blood sugar diagnostic Strp  TEST BLOOD SUGARS  TWICE DAILY FOR TRUMETRIX GLUCOMETER     blood-glucose meter Misc  TEST BLOOD SUGAR AS DIRECTED WITH TRUE METRIX MACHINE     cloNIDine 0.1 MG tablet  Commonly known as:  CATAPRES  TAKE 1 TABLET(0.1 MG) BY MOUTH TWICE DAILY     * diltiaZEM 120 MG Cp24  Commonly known as:  CARDIZEM CD  Take 1 capsule (120 mg total) by mouth once daily.     * CARTIA  MG Cp24  Generic drug:  diltiaZEM  TAKE ONE CAPSULE BY MOUTH ONCE DAILY     hydroCHLOROthiazide 25 MG tablet  Commonly known as:  HYDRODIURIL  TAKE 1 TABLET(25 MG) BY MOUTH EVERY DAY     ibuprofen 200 MG tablet  Commonly known as:  ADVIL,MOTRIN  Take 1 tablet (200 mg total) by mouth every 6 (six) hours as needed.     ipratropium 0.02 % nebulizer solution  Commonly known as:  ATROVENT     lancets Misc  Commonly known as:  ONETOUCH ULTRASOFT LANCETS  USE WITH TRUE METRIX METER AND TESTS STRIPS twice daily     lisinopril 2.5 MG tablet  Commonly known as:  PRINIVIL,ZESTRIL  TAKE 1 TABLET(2.5 MG) BY MOUTH EVERY DAY     metFORMIN 500 MG tablet  Commonly known as:  GLUCOPHAGE  TAKE 1 TABLET(500 MG) BY MOUTH DAILY WITH BREAKFAST     SYMBICORT 160-4.5 mcg/actuation Hfaa  Generic drug:  budesonide-formoterol 160-4.5 mcg     theophylline 300 MG 12 hr tablet  Commonly known as:  THEODUR  Take 1 tablet (300 mg total) by mouth 2 (two) times daily.         * This list has 2 medication(s) that are the same as other medications prescribed for you. Read the directions carefully, and ask your doctor or other care provider to review them with you.                SOCIAL HISTORY:     Social History     Socioeconomic History    Marital status:      Spouse name: Not on file    Number of children: Not on file    Years of education: Not on file    Highest education  "level: Not on file   Social Needs    Financial resource strain: Not on file    Food insecurity - worry: Not on file    Food insecurity - inability: Not on file    Transportation needs - medical: Not on file    Transportation needs - non-medical: Not on file   Occupational History    Not on file   Tobacco Use    Smoking status: Former Smoker    Smokeless tobacco: Never Used   Substance and Sexual Activity    Alcohol use: No    Drug use: No    Sexual activity: Not on file   Other Topics Concern    Not on file   Social History Narrative    .  Three children. Former smoker, quit 15 yr ago.  Retired , cook and kitchen work.        FAMILY HISTORY:     Family History   Problem Relation Age of Onset    Heart disease Mother     Cancer Father     Cancer Sister         breast       REVIEW OF SYSTEMS:   Review of Systems   Constitutional: Negative for chills, diaphoresis and fever.   HENT: Negative for nosebleeds.    Eyes: Negative for blurred vision, double vision and photophobia.   Respiratory: Positive for shortness of breath. Negative for hemoptysis and wheezing.    Cardiovascular: Positive for chest pain. Negative for palpitations, orthopnea, claudication, leg swelling and PND.   Gastrointestinal: Negative for abdominal pain, blood in stool, heartburn, melena, nausea and vomiting.   Genitourinary: Negative for flank pain and hematuria.   Musculoskeletal: Negative for falls, myalgias and neck pain.   Skin: Negative for rash.   Neurological: Negative for dizziness, seizures, loss of consciousness, weakness and headaches.   Endo/Heme/Allergies: Negative for polydipsia. Does not bruise/bleed easily.   Psychiatric/Behavioral: Negative for depression and memory loss. The patient is not nervous/anxious.        PHYSICAL EXAM:     Vitals:    10/08/18 1325   BP: 108/62   Pulse: 94   Resp: 15    Body mass index is 46.64 kg/m².  Weight: 115.7 kg (255 lb)   Height: 5' 2" (157.5 cm)     Physical Exam "   Constitutional: She is oriented to person, place, and time. She appears well-developed and well-nourished. She is cooperative.  Non-toxic appearance. No distress.   HENT:   Head: Normocephalic and atraumatic.   Eyes: Conjunctivae and EOM are normal. Pupils are equal, round, and reactive to light. No scleral icterus.   Neck: Trachea normal and normal range of motion. Neck supple. Normal carotid pulses and no JVD present. Carotid bruit is not present. No neck rigidity. No tracheal deviation and no edema present. No thyromegaly present.   Cardiovascular: Normal rate, regular rhythm, S1 normal and S2 normal. PMI is not displaced. Exam reveals no gallop and no friction rub.   No murmur heard.  Pulses:       Carotid pulses are 2+ on the right side, and 2+ on the left side.  Pulmonary/Chest: Effort normal and breath sounds normal. No stridor. No respiratory distress. She has no wheezes. She has no rales. She exhibits no tenderness.   Abdominal: Soft. She exhibits no distension. There is no hepatosplenomegaly.   obese   Musculoskeletal: She exhibits no edema or tenderness.   Feet:   Right Foot:   Skin Integrity: Negative for ulcer.   Left Foot:   Skin Integrity: Negative for ulcer.   Neurological: She is alert and oriented to person, place, and time. No cranial nerve deficit.   Skin: Skin is warm and dry. No rash noted. No erythema.   Psychiatric: She has a normal mood and affect. Her speech is normal and behavior is normal.   Vitals reviewed.      DATA:   EKG: (personally reviewed tracing)  10/8/18 SR 94, PAC, low volt    Laboratory:  CBC:  Recent Labs   Lab  01/25/18   1810   WHITE BLOOD CELL COUNT  5.85   HEMOGLOBIN  13.7   HEMATOCRIT  43.3   PLATELETS  174       CHEMISTRIES:  Recent Labs   Lab  10/16/17   1125  01/25/18   1810   7452 GLUCOSE  111 H   --    GLUCOSE   --   103   7448 SODIUM  142   --    SODIUM   --   141   POTASSIUM  4.3  3.8   BUN BLD  15  18   CREATININE   --   0.8   7451 CREATININE  0.65   --    EGFR  IF    --   >60   EGFR IF NON-   --   >60   CALCIUM  9.3  9.9       CARDIAC BIOMARKERS:  Recent Labs   Lab  01/25/18   1810   TROPONIN I  <0.006       COAGS:        LIPIDS/LFTS:  Recent Labs   Lab  10/16/17   1125  01/25/18   1810   AST  19  19   ALT   --   18       Cardiovascular Testing:  Carotid US 9/27/18  There is 80 - 99% right Internal Carotid stenosis.  There is 60 - 69% left Internal Carotid stenosis.    LE venous US/reflux 6/20/18  1. There is hemodynamically significant venous reflux (reflux lasting greater than 500 ms) within the bilateral greater saphenous vein.  2. There is no evidence of bilateral lower extremity deep venous thrombosis.    ASSESSMENT:   # preop CV eval prior to R CEA  # poor exercise capacity, pt unable to climb up a flight of stairs  # reported CP with exertion  # HTN, controlled  # O2 dep COPD  # HLP, on atorva 10mg  # DM  # BMI 47  # CVI    PLAN:   Start ASA 81mg qd  Inc atorva to 40mg qd  Echo  Tracy MPI  Pulm eval as planned  RTC 1 month for cardiac clearance  Check lipids/LFT 3 months (mid Jan 2019)    Desean Monroe MD, FACC

## 2018-10-08 NOTE — LETTER
October 8, 2018      Johnson Mcintosh MD  [address] [place] [address]           [facility] - Cardiology  [address] [facility] [address]  [facility] [address]  Phone: [phone]          Patient: Johanna Mancini   MR Number: [id number]   YOB: 1950   Date of Visit: 10/8/2018       Dear Dr. Leoncio Shaver:    Thank you for referring Johanna Mancini to me for evaluation. Attached you will find relevant portions of my assessment and plan of care.    If you have questions, please do not hesitate to call me. I look forward to following Johanna Mancini along with you.    Sincerely,    Desean Monroe MD    Enclosure  CC:  Leoncio Shaver MD    If you would like to receive this communication electronically, please contact [email or web address] or [phone] to request more information on [facility] access.    For providers and/or their staff who would like to refer a patient to [facility], please contact us through our one-stop-shop provider referral line, [facility] [place], at [phone].    If you feel you have received this communication in error or would no longer like to receive these types of communications, please e-mail [email or web address]

## 2018-10-09 ENCOUNTER — OFFICE VISIT (OUTPATIENT)
Dept: PODIATRY | Facility: CLINIC | Age: 68
End: 2018-10-09
Payer: MEDICARE

## 2018-10-09 VITALS — BODY MASS INDEX: 46.93 KG/M2 | WEIGHT: 255 LBS | HEIGHT: 62 IN

## 2018-10-09 DIAGNOSIS — M20.40 HAMMER TOE, UNSPECIFIED LATERALITY: ICD-10-CM

## 2018-10-09 DIAGNOSIS — E11.9 CONTROLLED TYPE 2 DIABETES MELLITUS WITHOUT COMPLICATION, UNSPECIFIED WHETHER LONG TERM INSULIN USE: Primary | ICD-10-CM

## 2018-10-09 DIAGNOSIS — I73.9 PVD (PERIPHERAL VASCULAR DISEASE): ICD-10-CM

## 2018-10-09 DIAGNOSIS — B35.1 ONYCHOMYCOSIS DUE TO DERMATOPHYTE: ICD-10-CM

## 2018-10-09 DIAGNOSIS — M21.619 BUNION: ICD-10-CM

## 2018-10-09 PROCEDURE — 99213 OFFICE O/P EST LOW 20 MIN: CPT | Mod: PBBFAC,PO,25 | Performed by: PODIATRIST

## 2018-10-09 PROCEDURE — 99999 PR PBB SHADOW E&M-EST. PATIENT-LVL III: CPT | Mod: PBBFAC,,, | Performed by: PODIATRIST

## 2018-10-09 PROCEDURE — 11721 DEBRIDE NAIL 6 OR MORE: CPT | Mod: PBBFAC,PO | Performed by: PODIATRIST

## 2018-10-09 PROCEDURE — 99499 UNLISTED E&M SERVICE: CPT | Mod: S$PBB,,, | Performed by: PODIATRIST

## 2018-10-10 NOTE — PROGRESS NOTES
Subjective:      Patient ID: Johanna Mancini is a 68 y.o. female.    Chief Complaint: Diabetes Mellitus (PCP Dr Mcintosh); Diabetic Foot Exam; and Nail Care    Johanna is a 68 y.o. female who presents to the clinic for evaluation and treatment of high risk feet. Johanna has a past medical history of Articular gout, Carotid artery occlusion, CHF (congestive heart failure), COPD (chronic obstructive pulmonary disease), Diabetes mellitus, type 2, Hyperlipidemia, Hypertension, Osteopetrosis, Osteoporosis, and Sleep apnea. The patient's chief complaint is long, thick toenails.  No other pedal complaints other than nails. DM foot exam.      This patient has documented high risk feet requiring routine maintenance secondary to peripheral vascular disease.    PCP: Johnson Mcintosh MD    Date Last Seen by PCP:   Chief Complaint   Patient presents with    Diabetes Mellitus     PCP Dr Mcintosh    Diabetic Foot Exam    Nail Care         Current shoe gear:    Rx diabetic extra depth shoes and custom accommodative insoles    Hemoglobin A1C   Date Value Ref Range Status   05/15/2018 6.1 (H) 4.0 - 5.6 % Final     Comment:     According to ADA guidelines, hemoglobin A1c <7.0% represents  optimal control in non-pregnant diabetic patients. Different  metrics may apply to specific patient populations.   Standards of Medical Care in Diabetes-2016.  For the purpose of screening for the presence of diabetes:  <5.7%     Consistent with the absence of diabetes  5.7-6.4%  Consistent with increasing risk for diabetes   (prediabetes)  >or=6.5%  Consistent with diabetes  Currently, no consensus exists for use of hemoglobin A1c  for diagnosis of diabetes for children.  This Hemoglobin A1c assay has significant interference with fetal   hemoglobin   (HbF). The results are invalid for patients with abnormal amounts of   HbF,   including those with known Hereditary Persistence   of Fetal Hemoglobin. Heterozygous hemoglobin variants (HbAS, HbAC,   HbAD, HbAE,  "HbA2) do not significantly interfere with this assay;   however, presence of multiple variants in a sample may impact the %   interference.     10/16/2017 6.2 3.1 - 6.5 %      Vitals:    10/09/18 1429   Weight: 115.7 kg (255 lb)   Height: 5' 2" (1.575 m)   PainSc: 0-No pain     Past Medical History:   Diagnosis Date    Articular gout     Carotid artery occlusion     CHF (congestive heart failure)     COPD (chronic obstructive pulmonary disease)     Diabetes mellitus, type 2     Hyperlipidemia     Hypertension     Osteopetrosis     Osteoporosis     Sleep apnea        Past Surgical History:   Procedure Laterality Date    CHOLECYSTECTOMY      HYSTERECTOMY      MINERVA equivalent       Family History   Problem Relation Age of Onset    Heart disease Mother     Cancer Father     Cancer Sister         breast       Social History     Socioeconomic History    Marital status:      Spouse name: None    Number of children: None    Years of education: None    Highest education level: None   Social Needs    Financial resource strain: None    Food insecurity - worry: None    Food insecurity - inability: None    Transportation needs - medical: None    Transportation needs - non-medical: None   Occupational History    None   Tobacco Use    Smoking status: Former Smoker    Smokeless tobacco: Never Used   Substance and Sexual Activity    Alcohol use: No    Drug use: No    Sexual activity: None   Other Topics Concern    None   Social History Narrative    .  Three children. Former smoker, quit 15 yr ago.  Retired , cook and kitchen work.        Current Outpatient Medications   Medication Sig Dispense Refill    albuterol (PROVENTIL) 2.5 mg /3 mL (0.083 %) nebulizer solution Take 3 mLs (2.5 mg total) by nebulization every 6 (six) hours as needed for Wheezing. 300 mL 1    allopurinol (ZYLOPRIM) 100 MG tablet Take 1 tablet (100 mg total) by mouth once daily. 90 tablet 1    aspirin (ECOTRIN) " 81 MG EC tablet Take 1 tablet (81 mg total) by mouth once daily. 90 tablet 3    atorvastatin (LIPITOR) 40 MG tablet Take 1 tablet (40 mg total) by mouth once daily. 90 tablet 3    blood sugar diagnostic Strp TEST BLOOD SUGARS  TWICE DAILY FOR TRUMETRIX GLUCOMETER 200 strip 0    blood-glucose meter Misc TEST BLOOD SUGAR AS DIRECTED WITH TRUE METRIX MACHINE 1 each 0    budesonide-formoterol 160-4.5 mcg (SYMBICORT) 160-4.5 mcg/actuation HFAA Inhale into the lungs.      CARTIA  mg Cp24 TAKE ONE CAPSULE BY MOUTH ONCE DAILY 90 capsule 3    cloNIDine (CATAPRES) 0.1 MG tablet TAKE 1 TABLET(0.1 MG) BY MOUTH TWICE DAILY 180 tablet 0    diltiaZEM (CARDIZEM CD) 120 MG Cp24 Take 1 capsule (120 mg total) by mouth once daily. 90 capsule 1    hydroCHLOROthiazide (HYDRODIURIL) 25 MG tablet TAKE 1 TABLET(25 MG) BY MOUTH EVERY DAY 90 tablet 0    ibuprofen (ADVIL,MOTRIN) 200 MG tablet Take 1 tablet (200 mg total) by mouth every 6 (six) hours as needed.      ipratropium (ATROVENT) 0.02 % nebulizer solution Inhale into the lungs.      lancets (ONETOUCH ULTRASOFT LANCETS) Misc USE WITH TRUE METRIX METER AND TESTS STRIPS twice daily 200 each 0    lisinopril (PRINIVIL,ZESTRIL) 2.5 MG tablet TAKE 1 TABLET(2.5 MG) BY MOUTH EVERY DAY 90 tablet 0    metFORMIN (GLUCOPHAGE) 500 MG tablet TAKE 1 TABLET(500 MG) BY MOUTH DAILY WITH BREAKFAST 90 tablet 0    theophylline (THEODUR) 300 MG 12 hr tablet Take 1 tablet (300 mg total) by mouth 2 (two) times daily. 180 tablet 1     No current facility-administered medications for this visit.        Review of patient's allergies indicates:   Allergen Reactions    Tylenol [acetaminophen] Anaphylaxis    Levofloxacin Itching       Review of Systems   Constitution: Negative for chills and fever.   Cardiovascular: Positive for leg swelling. Negative for chest pain and claudication.   Respiratory: Negative for cough and shortness of breath.    Skin: Positive for dry skin, nail changes and  suspicious lesions.   Musculoskeletal: Positive for gout (hx), muscle cramps (night cramps), muscle weakness, myalgias and stiffness.        Toenail pain, callus pain   Gastrointestinal: Negative for nausea and vomiting.   Neurological: Negative for numbness and paresthesias.   Psychiatric/Behavioral: Negative for altered mental status.           Objective:      Physical Exam   Constitutional: She is oriented to person, place, and time. She appears well-developed and well-nourished.   HENT:   Head: Normocephalic.   Cardiovascular: Intact distal pulses.   DP/PT pulses trace.  CRT < 3 sec to tips of toes. 2+ edema noted to b/l LE. ++ spider veins and vericosities noted to b/l LEs.      Pulmonary/Chest: No respiratory distress.   Musculoskeletal:   Gastrocnemius equinus noted to b/l ankles with decreased DF noted on exam. MMT 5/5 in DF/PF/Inv/Ev resistance with no reproduction of pain in any direction. Passive range of motion of ankle and pedal joints is painless. Adequate pedal joint ROM.     Hypermobility noted to 1st ray b/l with near complete collapse of medial longitudinal arch b/l with loading.     Semi-reducible hammertoe contractures noted to toes 2-4 b/l-asymptomatic.     Feet:   Right Foot:   Protective Sensation: 10 sites tested. 10 sites sensed.   Skin Integrity: Positive for callus. Negative for skin breakdown or dry skin.   Left Foot:   Protective Sensation: 10 sites tested. 10 sites sensed.   Skin Integrity: Negative for skin breakdown, callus or dry skin.   Neurological: She is alert and oriented to person, place, and time. She has normal strength. A sensory deficit is present.   Light touch, proprioception, and sharp/dull sensation are all intact bilaterally. Protective threshold with the Peerless-Wienstein monofilament is intact bilaterally. Vibratory sensation decreased b/l distal foot.     Skin: Skin is warm, dry and intact. Lesion noted. No bruising and no rash noted. No erythema.   No open lesions,  lacerations or wounds noted. Nails are thickened, elongated, discolored yellow/brown with subungual debris and brittleness to R 1-5 and L 1-5. Interdigital spaces clean, dry and intact b/l. No erythema noted to b/l foot. Skin texture thin, atrophic, dry. Pedal hair diminished. Toes cool to touch b/l    Hyperkeratotic lesion noted to plantar R hallux IPJ, 5th met head R plantar. Skin lines present to lesion/s. No signs of deep tissue injury.      Psychiatric: She has a normal mood and affect. Her behavior is normal. Judgment and thought content normal.   Vitals reviewed.            Assessment:       Encounter Diagnoses   Name Primary?    Controlled type 2 diabetes mellitus without complication, unspecified whether long term insulin use Yes    PVD (peripheral vascular disease)     Onychomycosis due to dermatophyte     Hammer toe, unspecified laterality     Bunion          Plan:       Johanna was seen today for diabetes mellitus, diabetic foot exam and nail care.    Diagnoses and all orders for this visit:    Controlled type 2 diabetes mellitus without complication, unspecified whether long term insulin use  -     DIABETIC SHOES FOR HOME USE  -     Routine Foot Care    PVD (peripheral vascular disease)    Onychomycosis due to dermatophyte  -     Routine Foot Care    Hammer toe, unspecified laterality  -     DIABETIC SHOES FOR HOME USE    Bunion  -     DIABETIC SHOES FOR HOME USE      I counseled the patient on her conditions, their implications and medical management.    - Shoe inspection. Diabetic Foot Education. Patient reminded of the importance of good nutrition and blood sugar control to help prevent podiatric complications of diabetes. Patient instructed on proper foot hygeine. We discussed wearing proper shoe gear, daily foot inspections, never walking without protective shoe gear, caution putting sharp instruments to feet     - Discussed DM foot care:  Wear comfortable, proper fitting shoes. Wash feet  daily. Dry well. After drying, apply moisturizer to feet (no lotion to webspaces). Inspect feet daily for skin breaks, blisters, swelling, or redness. Wear cotton socks (preferably white)  Change socks every day. Do NOT walk barefoot. Do NOT use heating pads or warm/hot water soaks     See routine foot care procedure note for nail debridement     Rx diabetic shoes with custom molded inserts to be worn at all times while ambulating. Prescription provided with list of local retailers.     Discussed regular and routine moisturizer to skin of both feet to help improve dry skin. Advised to apply twice daily until resolution of symptoms. Avoid between toes.       RTC 3 months, sooner PRBRENDA Kern DPM

## 2018-10-10 NOTE — PROCEDURES
Routine Foot Care  Date/Time: 10/9/2018 2:45 PM  Performed by: Debi Kern DPM  Authorized by: Debi Kern DPM     Consent Done?:  Yes (Verbal)  Hyperkeratotic Skin Lesions?: No      Nail Care Type:  Debride  Location(s): All  (Left 1st Toe, Left 3rd Toe, Left 2nd Toe, Left 4th Toe, Left 5th Toe, Right 1st Toe, Right 2nd Toe, Right 3rd Toe, Right 4th Toe and Right 5th Toe)  Patient tolerance:  Patient tolerated the procedure well with no immediate complications

## 2018-10-15 ENCOUNTER — HOSPITAL ENCOUNTER (OUTPATIENT)
Dept: RADIOLOGY | Facility: HOSPITAL | Age: 68
Discharge: HOME | End: 2018-10-15
Attending: INTERNAL MEDICINE
Payer: MEDICARE

## 2018-10-15 ENCOUNTER — HOSPITAL ENCOUNTER (OUTPATIENT)
Dept: CARDIOLOGY | Facility: HOSPITAL | Age: 68
Discharge: HOME | End: 2018-10-15
Attending: INTERNAL MEDICINE
Payer: MEDICARE

## 2018-10-15 VITALS — WEIGHT: 253 LBS | BODY MASS INDEX: 44.83 KG/M2 | HEIGHT: 63 IN

## 2018-10-15 DIAGNOSIS — R07.9 CHEST PAIN, UNSPECIFIED TYPE: ICD-10-CM

## 2018-10-15 DIAGNOSIS — Z01.810 PREOP CARDIOVASCULAR EXAM: ICD-10-CM

## 2018-10-15 DIAGNOSIS — E11.9 TYPE 2 DIABETES MELLITUS WITHOUT COMPLICATION, WITHOUT LONG-TERM CURRENT USE OF INSULIN: ICD-10-CM

## 2018-10-15 LAB
AORTIC ROOT ANNULUS: 2.66 CM
AORTIC VALVE CUSP SEPERATION: 1.6 CM
ASCENDING AORTA: 2.27 CM
AV MEAN GRADIENT: 6.36 MMHG
AV PEAK GRADIENT: 10.56 MMHG
AV VALVE AREA: 2.72 CM2
BSA FOR ECHO PROCEDURE: 2.26 M2
CV ECHO LV RWT: 0.52 CM
CV STRESS BASE HR: 71
DIASTOLIC BLOOD PRESSURE: 51
DOP CALC AO PEAK VEL: 1.62 M/S
DOP CALC AO VTI: 38.87 CM
DOP CALC LVOT AREA: 3.3 CM2
DOP CALC LVOT DIAMETER: 2.05 CM
DOP CALC LVOT STROKE VOLUME: 105.53 CM3
DOP CALCLVOT PEAK VEL VTI: 31.99 CM
E WAVE DECELERATION TIME: 224.01 MSEC
E/A RATIO: 0.94
E/E' RATIO: 8.95
ECHO LV POSTERIOR WALL: 1.08 CM (ref 0.6–1.1)
FRACTIONAL SHORTENING: 43 % (ref 28–44)
INTERVENTRICULAR SEPTUM: 1.14 CM (ref 0.6–1.1)
IVRT: 0.09 MSEC
LA MAJOR: 4.28 CM
LA MINOR: 5.57 CM
LA WIDTH: 3.8 CM
LEFT ATRIUM SIZE: 3.79 CM
LEFT ATRIUM VOLUME INDEX: 26.2 ML/M2
LEFT ATRIUM VOLUME: 59.26 CM3
LEFT INTERNAL DIMENSION IN SYSTOLE: 2.44 CM (ref 2.1–4)
LEFT VENTRICLE DIASTOLIC VOLUME INDEX: 36.1 ML/M2
LEFT VENTRICLE DIASTOLIC VOLUME: 81.59 ML
LEFT VENTRICLE MASS INDEX: 72.2 G/M2
LEFT VENTRICLE SYSTOLIC VOLUME INDEX: 9.3 ML/M2
LEFT VENTRICLE SYSTOLIC VOLUME: 20.99 ML
LEFT VENTRICULAR INTERNAL DIMENSION IN DIASTOLE: 4.27 CM (ref 3.5–6)
LEFT VENTRICULAR MASS: 163.26 G
LV LATERAL E/E' RATIO: 8.5
LV SEPTAL E/E' RATIO: 9.44
MV PEAK A VEL: 0.9 M/S
MV PEAK E VEL: 0.85 M/S
MV STENOSIS PRESSURE HALF TIME: 87.49 MS
MV VALVE AREA P 1/2 METHOD: 2.51 CM2
NUC STRESS EJECTION FRACTION: 73 %
OHS CV CPX 85 PERCENT MAX PREDICTED HEART RATE MALE: 123
OHS CV CPX MAX PREDICTED HEART RATE: 144
OHS CV CPX PATIENT IS FEMALE: 1
OHS CV CPX PATIENT IS MALE: 0
OHS CV CPX PEAK DIASTOLIC BLOOD PRESSURE: 63 MMHG
OHS CV CPX PEAK HEAR RATE: 90
OHS CV CPX PEAK RATE PRESSURE PRODUCT: NORMAL
OHS CV CPX PEAK SYSTOLIC BLOOD PRESSURE: 137
OHS CV CPX PERCENT MAX PREDICTED HEART RATE ACHIEVED: 62
OHS CV CPX RATE PRESSURE PRODUCT PRESENTING: 7881
PISA TR MAX VEL: 1.63 M/S
PULM VEIN S/D RATIO: 1.27
PV PEAK D VEL: 0.33 M/S
PV PEAK GRADIENT: 4.54 MMHG
PV PEAK S VEL: 0.42 M/S
PV PEAK VELOCITY: 1.07 CM/S
RA MAJOR: 4.17 CM
RA PRESSURE: 3 MMHG
RA WIDTH: 3.37 CM
RETIRED EF AND QEF - SEE NOTES: 74.27 %
RIGHT VENTRICULAR END-DIASTOLIC DIMENSION: 3.65 CM
SINUS: 2.74 CM
STJ: 2.2 CM
SYSTOLIC BLOOD PRESSURE: 111
TDI LATERAL: 0.1
TDI SEPTAL: 0.09
TDI: 0.1
TR MAX PG: 10.63 MMHG
TRICUSPID ANNULAR PLANE SYSTOLIC EXCURSION: 0.02 CM
TV REST PULMONARY ARTERY PRESSURE: 13.63 MMHG

## 2018-10-15 PROCEDURE — 93018 CV STRESS TEST I&R ONLY: CPT | Mod: ,,, | Performed by: INTERNAL MEDICINE

## 2018-10-15 PROCEDURE — 78452 HT MUSCLE IMAGE SPECT MULT: CPT | Mod: 26,,, | Performed by: INTERNAL MEDICINE

## 2018-10-15 PROCEDURE — 93306 TTE W/DOPPLER COMPLETE: CPT

## 2018-10-15 PROCEDURE — 93016 CV STRESS TEST SUPVJ ONLY: CPT | Mod: ,,, | Performed by: INTERNAL MEDICINE

## 2018-10-15 PROCEDURE — 78452 HT MUSCLE IMAGE SPECT MULT: CPT

## 2018-10-15 PROCEDURE — 63600175 PHARM REV CODE 636 W HCPCS

## 2018-10-15 PROCEDURE — 93306 TTE W/DOPPLER COMPLETE: CPT | Mod: 26,,, | Performed by: INTERNAL MEDICINE

## 2018-10-15 RX ORDER — REGADENOSON 0.08 MG/ML
INJECTION, SOLUTION INTRAVENOUS
Status: COMPLETED
Start: 2018-10-15 | End: 2018-10-15

## 2018-10-15 RX ADMIN — REGADENOSON: 0.08 INJECTION, SOLUTION INTRAVENOUS at 09:10

## 2018-10-19 DIAGNOSIS — E11.9 TYPE 2 DIABETES MELLITUS WITHOUT COMPLICATION: ICD-10-CM

## 2018-10-22 ENCOUNTER — OFFICE VISIT (OUTPATIENT)
Dept: CARDIOLOGY | Facility: CLINIC | Age: 68
End: 2018-10-22
Payer: MEDICARE

## 2018-10-22 VITALS
HEART RATE: 84 BPM | SYSTOLIC BLOOD PRESSURE: 116 MMHG | DIASTOLIC BLOOD PRESSURE: 58 MMHG | RESPIRATION RATE: 15 BRPM | HEIGHT: 63 IN | OXYGEN SATURATION: 95 % | WEIGHT: 257.06 LBS | BODY MASS INDEX: 45.55 KG/M2

## 2018-10-22 DIAGNOSIS — E11.9 TYPE 2 DIABETES MELLITUS WITHOUT COMPLICATION, WITHOUT LONG-TERM CURRENT USE OF INSULIN: ICD-10-CM

## 2018-10-22 DIAGNOSIS — E78.2 MIXED HYPERLIPIDEMIA: ICD-10-CM

## 2018-10-22 DIAGNOSIS — I65.21 STENOSIS OF RIGHT CAROTID ARTERY: ICD-10-CM

## 2018-10-22 DIAGNOSIS — Z01.810 PREOP CARDIOVASCULAR EXAM: Primary | ICD-10-CM

## 2018-10-22 DIAGNOSIS — I10 HYPERTENSION, ESSENTIAL: ICD-10-CM

## 2018-10-22 DIAGNOSIS — J43.1 PANLOBULAR EMPHYSEMA: ICD-10-CM

## 2018-10-22 DIAGNOSIS — I83.813 VARICOSE VEINS OF BOTH LOWER EXTREMITIES WITH PAIN: ICD-10-CM

## 2018-10-22 PROCEDURE — 99999 PR PBB SHADOW E&M-EST. PATIENT-LVL III: CPT | Mod: PBBFAC,,, | Performed by: INTERNAL MEDICINE

## 2018-10-22 PROCEDURE — 99213 OFFICE O/P EST LOW 20 MIN: CPT | Mod: PBBFAC | Performed by: INTERNAL MEDICINE

## 2018-10-22 PROCEDURE — 99214 OFFICE O/P EST MOD 30 MIN: CPT | Mod: S$PBB,,, | Performed by: INTERNAL MEDICINE

## 2018-10-22 NOTE — PROGRESS NOTES
CARDIOVASCULAR PROGRESS NOTE    REASON FOR CONSULT:   Johanna Mancini is a 68 y.o. female who presents for preop CV eval, testing review.    Ricardoc: Sivakumar  PCP: Page  HISTORY OF PRESENT ILLNESS:     The patient returns for follow-up of her testing.  She denies any further chest discomfort and does admit to continued chronic dyspnea.  She has follow-up with Pulmonary planned at [facility] later this month.  She otherwise has had no palpitations, lightheadedness, dizziness, or syncope.  She denies PND, orthopnea, or lower extremity edema.  There has been no melena, hematuria, or claudicant symptoms.      I reviewed the results of her echo and nuclear stress test which were both essentially normal.    CARDIOVASCULAR HISTORY:   Carotid stenosis R>L  CVI    PAST MEDICAL HISTORY:     Past Medical History:   Diagnosis Date    Articular gout     Carotid artery occlusion     CHF (congestive heart failure)     COPD (chronic obstructive pulmonary disease)     Diabetes mellitus, type 2     Hyperlipidemia     Hypertension     Osteopetrosis     Osteoporosis     Sleep apnea        PAST SURGICAL HISTORY:     Past Surgical History:   Procedure Laterality Date    CHOLECYSTECTOMY      HYSTERECTOMY      MINERVA equivalent       ALLERGIES AND MEDICATION:     Review of patient's allergies indicates:   Allergen Reactions    Tylenol [acetaminophen] Anaphylaxis    Levofloxacin Itching        Medication List           Accurate as of 10/22/18  2:33 PM. If you have any questions, ask your nurse or doctor.               CONTINUE taking these medications    albuterol 2.5 mg /3 mL (0.083 %) nebulizer solution  Commonly known as:  PROVENTIL  Take 3 mLs (2.5 mg total) by nebulization every 6 (six) hours as needed for Wheezing.     allopurinol 100 MG tablet  Commonly known as:  ZYLOPRIM  Take 1 tablet (100 mg total) by mouth once daily.     aspirin 81 MG EC tablet  Commonly known as:  ECOTRIN  Take 1 tablet (81 mg total)  by mouth once daily.     atorvastatin 40 MG tablet  Commonly known as:  LIPITOR  Take 1 tablet (40 mg total) by mouth once daily.     blood sugar diagnostic Strp  TEST BLOOD SUGARS  TWICE DAILY FOR TRUMETRIX GLUCOMETER     blood-glucose meter Misc  TEST BLOOD SUGAR AS DIRECTED WITH TRUE METRIX MACHINE     cloNIDine 0.1 MG tablet  Commonly known as:  CATAPRES  TAKE 1 TABLET(0.1 MG) BY MOUTH TWICE DAILY     * diltiaZEM 120 MG Cp24  Commonly known as:  CARDIZEM CD  Take 1 capsule (120 mg total) by mouth once daily.     * CARTIA  MG Cp24  Generic drug:  diltiaZEM  TAKE ONE CAPSULE BY MOUTH ONCE DAILY     hydroCHLOROthiazide 25 MG tablet  Commonly known as:  HYDRODIURIL  TAKE 1 TABLET(25 MG) BY MOUTH EVERY DAY     ibuprofen 200 MG tablet  Commonly known as:  ADVIL,MOTRIN  Take 1 tablet (200 mg total) by mouth every 6 (six) hours as needed.     ipratropium 0.02 % nebulizer solution  Commonly known as:  ATROVENT     lancets Misc  Commonly known as:  ONETOUCH ULTRASOFT LANCETS  USE WITH TRUE METRIX METER AND TESTS STRIPS twice daily     lisinopril 2.5 MG tablet  Commonly known as:  PRINIVIL,ZESTRIL  TAKE 1 TABLET(2.5 MG) BY MOUTH EVERY DAY     metFORMIN 500 MG tablet  Commonly known as:  GLUCOPHAGE  TAKE 1 TABLET(500 MG) BY MOUTH DAILY WITH BREAKFAST     SYMBICORT 160-4.5 mcg/actuation Hfaa  Generic drug:  budesonide-formoterol 160-4.5 mcg     theophylline 300 MG 12 hr tablet  Commonly known as:  THEODUR  Take 1 tablet (300 mg total) by mouth 2 (two) times daily.         * This list has 2 medication(s) that are the same as other medications prescribed for you. Read the directions carefully, and ask your doctor or other care provider to review them with you.                SOCIAL HISTORY:     Social History     Socioeconomic History    Marital status:      Spouse name: Not on file    Number of children: Not on file    Years of education: Not on file    Highest education level: Not on file   Social Needs     "Financial resource strain: Not on file    Food insecurity - worry: Not on file    Food insecurity - inability: Not on file    Transportation needs - medical: Not on file    Transportation needs - non-medical: Not on file   Occupational History    Not on file   Tobacco Use    Smoking status: Former Smoker    Smokeless tobacco: Never Used   Substance and Sexual Activity    Alcohol use: No    Drug use: No    Sexual activity: Not on file   Other Topics Concern    Not on file   Social History Narrative    .  Three children. Former smoker, quit 15 yr ago.  Retired , cook and kitchen work.        FAMILY HISTORY:     Family History   Problem Relation Age of Onset    Heart disease Mother     Cancer Father     Cancer Sister         breast       REVIEW OF SYSTEMS:   Review of Systems   Constitutional: Negative for chills, diaphoresis and fever.   HENT: Negative for nosebleeds.    Eyes: Negative for blurred vision, double vision and photophobia.   Respiratory: Positive for shortness of breath. Negative for hemoptysis and wheezing.    Cardiovascular: Negative for chest pain, palpitations, orthopnea, claudication, leg swelling and PND.   Gastrointestinal: Negative for abdominal pain, blood in stool, heartburn, melena, nausea and vomiting.   Genitourinary: Negative for flank pain and hematuria.   Musculoskeletal: Negative for falls, myalgias and neck pain.   Skin: Negative for rash.   Neurological: Negative for dizziness, seizures, loss of consciousness, weakness and headaches.   Endo/Heme/Allergies: Negative for polydipsia. Does not bruise/bleed easily.   Psychiatric/Behavioral: Negative for depression and memory loss. The patient is not nervous/anxious.        PHYSICAL EXAM:     Vitals:    10/22/18 1420   BP: (!) 116/58   Pulse: 84   Resp: 15    Body mass index is 45.54 kg/m².  Weight: 116.6 kg (257 lb 0.9 oz)   Height: 5' 3" (160 cm)     Physical Exam   Constitutional: She is oriented to person, " place, and time. She appears well-developed and well-nourished. She is cooperative.  Non-toxic appearance. No distress.   HENT:   Head: Normocephalic and atraumatic.   Eyes: Conjunctivae and EOM are normal. Pupils are equal, round, and reactive to light. No scleral icterus.   Neck: Trachea normal and normal range of motion. Neck supple. Normal carotid pulses and no JVD present. Carotid bruit is not present. No neck rigidity. No tracheal deviation and no edema present. No thyromegaly present.   Cardiovascular: Normal rate, regular rhythm, S1 normal and S2 normal. PMI is not displaced. Exam reveals no gallop and no friction rub.   No murmur heard.  Pulses:       Carotid pulses are 2+ on the right side, and 2+ on the left side.  Pulmonary/Chest: Effort normal and breath sounds normal. No stridor. No respiratory distress. She has no wheezes. She has no rales. She exhibits no tenderness.   Abdominal: Soft. She exhibits no distension. There is no hepatosplenomegaly.   obese   Musculoskeletal: She exhibits no edema or tenderness.   Feet:   Right Foot:   Skin Integrity: Negative for ulcer.   Left Foot:   Skin Integrity: Negative for ulcer.   Neurological: She is alert and oriented to person, place, and time. No cranial nerve deficit.   Skin: Skin is warm and dry. No rash noted. No erythema.   Psychiatric: She has a normal mood and affect. Her speech is normal and behavior is normal.   Vitals reviewed.      DATA:   EKG: (personally reviewed tracing)  10/8/18 SR 94, PAC, low volt    Laboratory:  CBC:  Recent Labs   Lab 01/25/18  1810   WHITE BLOOD CELL COUNT 5.85   HEMOGLOBIN 13.7   HEMATOCRIT 43.3   PLATELETS 174       CHEMISTRIES:  Recent Labs   Lab 10/16/17  1125 01/25/18  1810   7452 GLUCOSE 111 H  --    GLUCOSE  --  103   7448 SODIUM 142  --    SODIUM  --  141   POTASSIUM 4.3 3.8   BUN BLD 15 18   CREATININE  --  0.8   7451 CREATININE 0.65  --    EGFR IF   --  >60   EGFR IF NON-  --  >60    CALCIUM 9.3 9.9       CARDIAC BIOMARKERS:  Recent Labs   Lab 01/25/18  1810   TROPONIN I <0.006       COAGS:        LIPIDS/LFTS:  Recent Labs   Lab 10/16/17  1125 01/25/18  1810   AST 19 19   ALT  --  18       Cardiovascular Testing:  Tracy MPI 10/15/18 (images pers rev)  · The perfusion scan is free of evidence from myocardial ischemia or injury.  · Post Stress Ejection Fraction is 73 %    Echo 10/15/18  · Left ventricle ejection fraction is normal at 60%  · Left ventricle shows concentric remodeling.  · Normal LV diastolic function.  · RV systolic function is normal.  · Mild aortic valve stenosis.  · KOLBY is 2.72 cm2; peak velocity is 1.62 m/s; mean gradient is 6.36 mmHg.    Carotid US 9/27/18  There is 80 - 99% right Internal Carotid stenosis.  There is 60 - 69% left Internal Carotid stenosis.    LE venous US/reflux 6/20/18  1. There is hemodynamically significant venous reflux (reflux lasting greater than 500 ms) within the bilateral greater saphenous vein.  2. There is no evidence of bilateral lower extremity deep venous thrombosis.    ASSESSMENT:   # preop CV eval prior to R CEA.  Echo and MPI 10/2018 normal.  # reported CP with exertion.  Echo and MPI 10/2018 normal.  # HTN, controlled  # O2 dep COPD, pulm eval pending (at [facility])  # HLP, on atorva 40mg  # DM  # BMI 46, down 1 unit vs last OV  # CVI    PLAN:   Cont med rx  Pulm eval as planned  Check lipids/LFT 3 months (mid Jan 2019)  RTC 3 months    Desean Monroe MD, FACC    Addendum 10/31/18:  The pt is at low cardiac risk for the planned surgery and anesthesia as deemed necessary, no further cardiac testing necessary.

## 2018-11-12 ENCOUNTER — OFFICE VISIT (OUTPATIENT)
Dept: FAMILY MEDICINE | Facility: CLINIC | Age: 68
End: 2018-11-12
Payer: MEDICARE

## 2018-11-12 ENCOUNTER — OFFICE VISIT (OUTPATIENT)
Dept: VASCULAR SURGERY | Facility: CLINIC | Age: 68
End: 2018-11-12
Payer: MEDICARE

## 2018-11-12 VITALS
HEART RATE: 82 BPM | SYSTOLIC BLOOD PRESSURE: 125 MMHG | BODY MASS INDEX: 43.23 KG/M2 | HEIGHT: 63 IN | DIASTOLIC BLOOD PRESSURE: 60 MMHG | WEIGHT: 244 LBS

## 2018-11-12 VITALS
DIASTOLIC BLOOD PRESSURE: 78 MMHG | RESPIRATION RATE: 16 BRPM | OXYGEN SATURATION: 93 % | HEART RATE: 96 BPM | TEMPERATURE: 98 F | SYSTOLIC BLOOD PRESSURE: 126 MMHG | WEIGHT: 247.38 LBS | BODY MASS INDEX: 43.83 KG/M2 | HEIGHT: 63 IN

## 2018-11-12 DIAGNOSIS — I65.23 CAROTID STENOSIS, ASYMPTOMATIC, BILATERAL: Primary | ICD-10-CM

## 2018-11-12 DIAGNOSIS — J44.1 ACUTE EXACERBATION OF CHRONIC OBSTRUCTIVE PULMONARY DISEASE (COPD): Primary | ICD-10-CM

## 2018-11-12 PROCEDURE — 99213 OFFICE O/P EST LOW 20 MIN: CPT | Mod: PBBFAC | Performed by: SURGERY

## 2018-11-12 PROCEDURE — 99214 OFFICE O/P EST MOD 30 MIN: CPT | Mod: S$PBB,,, | Performed by: SURGERY

## 2018-11-12 PROCEDURE — 99213 OFFICE O/P EST LOW 20 MIN: CPT | Mod: PBBFAC,27,PN | Performed by: FAMILY MEDICINE

## 2018-11-12 PROCEDURE — 99999 PR PBB SHADOW E&M-EST. PATIENT-LVL III: CPT | Mod: PBBFAC,,, | Performed by: FAMILY MEDICINE

## 2018-11-12 PROCEDURE — 99999 PR PBB SHADOW E&M-EST. PATIENT-LVL III: CPT | Mod: PBBFAC,,, | Performed by: SURGERY

## 2018-11-12 PROCEDURE — 99214 OFFICE O/P EST MOD 30 MIN: CPT | Mod: S$PBB,,, | Performed by: FAMILY MEDICINE

## 2018-11-12 RX ORDER — DOXYCYCLINE 100 MG/1
100 CAPSULE ORAL EVERY 12 HOURS
Qty: 20 CAPSULE | Refills: 0 | Status: SHIPPED | OUTPATIENT
Start: 2018-11-12 | End: 2018-11-22

## 2018-11-12 RX ORDER — PREDNISONE 20 MG/1
40 TABLET ORAL DAILY
Qty: 10 TABLET | Refills: 0 | Status: SHIPPED | OUTPATIENT
Start: 2018-11-12 | End: 2018-11-17

## 2018-11-12 NOTE — PATIENT INSTRUCTIONS
Having Carotid Endarterectomy     A skin incision is made over the carotid artery.     Endarterectomy is the removal of plaque from the carotid artery through an incision in the neck. This surgery has a low risk of stroke or complication (1% to 3%). It typically involves a quick recovery with little pain. You may be asleep under general anesthesia during surgery, or awake with local anesthesia to control pain. This will be discussed with you before surgery.  How the endarterectomy is performed  1. Make the skin incision. The surgeon makes an incision in the skin over the carotid artery. The image above shows a common incision site and length.  2. Open the artery. The surgeon places clamps on the artery above and below the blockage. This temporarily stops blood flow. The brain receives blood from the carotid artery on the other side of your neck. The surgeon then makes an incision in the artery itself.  3. Place shunt. A shunt may be used to preserve blood flow to the brain during the procedure. After the shunt is in place, the clamps are removed from the internal carotid artery. In some cases a shunt is not needed because the brain is receiving enough blood through other arteries (from the carotid artery on the other side of your neck).   4. Remove plaque. The surgeon loosens plaque from the artery wall. The plaque is then removed, often in a single piece. The surgeon inspects the artery to confirm that all of the plaque has been removed.  5.      A shunt helps keep blood flowing during the procedure.     Close the incision. The surgeon closes the incision using either sutures or a patch. The clamps are then removed. Next, the skin incision is sutured closed. A tube or drain may be put in place to keep fluids from collecting around the area.  The surgery usually takes around 2 hours, but may be longer depending on the anesthetic and your situation.  Date Last Reviewed: 6/8/2015  © 2461-2471 [employer] [address] [place] [id number]. All rights reserved. This information is not intended as a substitute for professional medical care. Always follow your healthcare professional's instructions.        Preparing for Carotid Endarterectomy  Carotid endarterectomy removes plaque that has built up in your carotid artery. This helps reduce your risk for stroke. You will be told how to prepare for your procedure. Be sure to follow all of the instructions you are given.    A week before  Before the procedure, be sure to:  · Tell your doctor about any allergies you have.  · Tell your doctor about all medications you take. This includes over-the-counter medications, herbs, and supplements. Be sure to mention if you are taking blood thinners.  · Make medication changes as directed by your doctor. You may need to stop taking certain medications you normally take. Or you may be told to start taking certain medications before surgery.  The day before  Before you have your procedure, be sure to:  · Arrange for a ride home when your hospital recovery is finished. An adult family member or friend should drive you home.  · Dont eat or drink after midnight, the night before the procedure. Ask your doctor whether you should continue to take any medications during this period.  · Follow any other instructions you are given.  On the day of the procedure  When you arrive at the hospital, youll change into a hospital gown. Hospital staff will prepare you for the procedure. An IV line will be started to provide you with fluids and medications. You will then be taken to the room where the procedure is performed.  Risks and possible complications  The risks of this procedure include:  · TIA or stroke  · Bleeding at the incision site  · Headache  · Bleeding into the brain  · Seizure  · Heart attack  · Nerve injury leading to temporary or permanent hoarseness, numbness, or swallowing problems  · Death   Date Last Reviewed:  6/13/2015  © 4459-4208 [employer] [address] [place] [address], [place] [address]. All rights reserved. This information is not intended as a substitute for professional medical care. Always follow your healthcare professional's instructions.

## 2018-11-12 NOTE — LETTER
November 12, 2018        Johnson Mcintosh MD  [address] [facility] [address]             [facility]  [address]  [facility] [address]  Phone: [phone]  Fax: [phone]   Patient: Johanna Mancini   MR Number: [id number]   YOB: 1950   Date of Visit: 11/12/2018       Dear Dr. Mcintosh:    Thank you for referring Johanna Mancini to me for evaluation. Below are the relevant portions of my assessment and plan of care.            If you have questions, please do not hesitate to call me. I look forward to following Johanna along with you.    Sincerely,      Leoncio Shaver MD           CC  No Recipients

## 2018-11-12 NOTE — PROGRESS NOTES
Leoncio Shaver MD [facility]                         11/12/2018    HPI:  Johanna Mancini is a 68 y.o. female with   Patient Active Problem List   Diagnosis    Varicose veins of both lower extremities with pain    COPD (chronic obstructive pulmonary disease)    Gout    Hypoxemia requiring supplemental oxygen    Hypertension, essential    Type 2 diabetes mellitus without complication, without long-term current use of insulin    Hemoglobin A1c less than 7.0%    Idiopathic chronic gout of multiple sites without tophus    Age-related osteoporosis without current pathological fracture    Umbilical hernia without obstruction and without gangrene    Mixed hyperlipidemia    Morbid obesity, unspecified obesity type    Stenosis of right carotid artery    being managed by PCP and specialists who is here today for evaluation of BLE varicose veins.  Patient states location is lower aspect of legs bilaterally occurring for many years.  Associated signs and symptoms include bleeding.  Has R posterior calf pain.  Quality is sharp and severity is 5/10.  Symptoms began several years ago.  Alleviating factors include elevation.  Worsening factors include dependency.    no MI  no Stroke  Tobacco use: quit 20 yrs ago    Interval history: Cont to experience BLE edema, not compliant with compression due to pain.  On home O2 not fully compliant.  PARIKH at 1/2 block.  No chest pain.  Minimal elevation.  No TIA, CVA or new neuro symptoms.    Past Medical History:   Diagnosis Date    Articular gout     Carotid artery occlusion     CHF (congestive heart failure)     COPD (chronic obstructive pulmonary disease)     Diabetes mellitus, type 2     Hyperlipidemia     Hypertension     Osteopetrosis     Osteoporosis     Sleep apnea      Past Surgical History:   Procedure Laterality Date    CHOLECYSTECTOMY      HYSTERECTOMY      MINERVA equivalent     Family History   Problem  Relation Age of Onset    Heart disease Mother     Cancer Father     Cancer Sister         breast     Social History     Socioeconomic History    Marital status:      Spouse name: Not on file    Number of children: Not on file    Years of education: Not on file    Highest education level: Not on file   Social Needs    Financial resource strain: Not on file    Food insecurity - worry: Not on file    Food insecurity - inability: Not on file    Transportation needs - medical: Not on file    Transportation needs - non-medical: Not on file   Occupational History    Not on file   Tobacco Use    Smoking status: Former Smoker    Smokeless tobacco: Never Used   Substance and Sexual Activity    Alcohol use: No    Drug use: No    Sexual activity: Not on file   Other Topics Concern    Not on file   Social History Narrative    .  Three children. Former smoker, quit 15 yr ago.  Retired , cook and kitchen work.        Current Outpatient Medications:     albuterol (PROVENTIL) 2.5 mg /3 mL (0.083 %) nebulizer solution, Take 3 mLs (2.5 mg total) by nebulization every 6 (six) hours as needed for Wheezing., Disp: 300 mL, Rfl: 1    allopurinol (ZYLOPRIM) 100 MG tablet, Take 1 tablet (100 mg total) by mouth once daily., Disp: 90 tablet, Rfl: 1    aspirin (ECOTRIN) 81 MG EC tablet, Take 1 tablet (81 mg total) by mouth once daily., Disp: 90 tablet, Rfl: 3    atorvastatin (LIPITOR) 40 MG tablet, Take 1 tablet (40 mg total) by mouth once daily., Disp: 90 tablet, Rfl: 3    blood sugar diagnostic Strp, TEST BLOOD SUGARS  TWICE DAILY FOR TRUMETRIX GLUCOMETER, Disp: 200 strip, Rfl: 0    blood-glucose meter Misc, TEST BLOOD SUGAR AS DIRECTED WITH TRUE METRIX MACHINE, Disp: 1 each, Rfl: 0    budesonide-formoterol 160-4.5 mcg (SYMBICORT) 160-4.5 mcg/actuation HFAA, Inhale into the lungs., Disp: , Rfl:     CARTIA  mg Cp24, TAKE ONE CAPSULE BY MOUTH ONCE DAILY, Disp: 90 capsule, Rfl: 3    cloNIDine  (CATAPRES) 0.1 MG tablet, TAKE 1 TABLET(0.1 MG) BY MOUTH TWICE DAILY, Disp: 180 tablet, Rfl: 0    diltiaZEM (CARDIZEM CD) 120 MG Cp24, Take 1 capsule (120 mg total) by mouth once daily., Disp: 90 capsule, Rfl: 1    hydroCHLOROthiazide (HYDRODIURIL) 25 MG tablet, TAKE 1 TABLET(25 MG) BY MOUTH EVERY DAY, Disp: 90 tablet, Rfl: 0    ibuprofen (ADVIL,MOTRIN) 200 MG tablet, Take 1 tablet (200 mg total) by mouth every 6 (six) hours as needed., Disp: , Rfl:     ipratropium (ATROVENT) 0.02 % nebulizer solution, Inhale into the lungs., Disp: , Rfl:     lancets (ONETOUCH ULTRASOFT LANCETS) Misc, USE WITH TRUE METRIX METER AND TESTS STRIPS twice daily, Disp: 200 each, Rfl: 0    lisinopril (PRINIVIL,ZESTRIL) 2.5 MG tablet, TAKE 1 TABLET(2.5 MG) BY MOUTH EVERY DAY, Disp: 90 tablet, Rfl: 0    metFORMIN (GLUCOPHAGE) 500 MG tablet, TAKE 1 TABLET(500 MG) BY MOUTH DAILY WITH BREAKFAST, Disp: 90 tablet, Rfl: 0    theophylline (THEODUR) 300 MG 12 hr tablet, Take 1 tablet (300 mg total) by mouth 2 (two) times daily., Disp: 180 tablet, Rfl: 1    REVIEW OF SYSTEMS:  General: No fevers or chills; ENT: No sore throat; Allergy and Immunology: no persistent infections; Hematological and Lymphatic: No history of bleeding or easy bruising; Endocrine: negative; Respiratory: no cough, shortness of breath, or wheezing; Cardiovascular: no chest pain or dyspnea on exertion; Gastrointestinal: no abdominal pain/back, change in bowel habits, or bloody stools; Genito-Urinary: no dysuria, trouble voiding, or hematuria; Musculoskeletal: negative; Neurological: no TIA or stroke symptoms; Psychiatric: no nervousness, anxiety or depression.    PHYSICAL EXAM:      Pulse: 82         General appearance:  Alert, well-appearing, and in no distress.  Oriented to person, place, and time                    Neurological: Normal speech, no focal findings noted; CN II - XII grossly intact. RLE with sensation to light touch, LLE with sensation to light touch.             Musculoskeletal: Digits/nail without cyanosis/clubbing.  Strength 5/5 BLE.                    Neck: Supple, no significant adenopathy, L carotid bruit can be auscultated                  Chest:  Clear to auscultation, no wheezes, rales or rhonchi, symmetric air entry. No use of accessory muscles               Cardiac: Normal rate and regular rhythm, S1 and S2 normal            Abdomen: Soft, nontender, nondistended, no masses or organomegaly, no hernia     No rebound tenderness noted; bowel sounds normal     No groin adenopathy      Extremities:   2+ R femoral pulse, 2+ L femoral pulse     1+ R popliteal pulse, 1+ L popliteal pulse     1+ R PT pulse, 1+ L PT pulse     1+ R DP pulse, 1+ L DP pulse     2+ RLE edema, 2+ LLE edema    Skin: BLE with varicose veins and spider veins    LAB RESULTS:  No results found for: CBC  Lab Results   Component Value Date    LABPROT 10.7 12/17/2011    INR 1.0 12/17/2011     Lab Results   Component Value Date     01/25/2018    K 3.8 01/25/2018    CL 97 01/25/2018    CO2 30 (H) 01/25/2018     01/25/2018    BUN 18 01/25/2018    CREATININE 0.8 01/25/2018    CALCIUM 9.9 01/25/2018    ANIONGAP 14 01/25/2018    EGFRNONAA >60 01/25/2018     Lab Results   Component Value Date    WBC 5.85 01/25/2018    RBC 4.86 01/25/2018    HGB 13.7 01/25/2018    HCT 43.3 01/25/2018    MCV 89 01/25/2018    MCH 28.2 01/25/2018    MCHC 31.6 (L) 01/25/2018    RDW 13.7 01/25/2018     01/25/2018    MPV 12.2 01/25/2018    GRAN 44.0 01/25/2018    LYMPH 40.0 01/25/2018    MONO 7.0 01/25/2018    EOS 0.2 06/27/2014    BASO 0.01 06/27/2014    EOSINOPHIL 3.0 01/25/2018    BASOPHIL 0.0 01/25/2018    DIFFMETHOD Automated 01/25/2018     .  Lab Results   Component Value Date    HGBA1C 6.1 (H) 05/15/2018       IMAGING:  All pertinent imaging has been reviewed and interpreted independently.    Bilateral GSV reflux.  No DVT.    Carotid US 9/27/18: R 80-99% stenosis, L 60-79% stenosis    IMP/PLAN:  68  y.o. female with   Patient Active Problem List   Diagnosis    Varicose veins of both lower extremities with pain    COPD (chronic obstructive pulmonary disease)    Gout    Hypoxemia requiring supplemental oxygen    Hypertension, essential    Type 2 diabetes mellitus without complication, without long-term current use of insulin    Hemoglobin A1c less than 7.0%    Idiopathic chronic gout of multiple sites without tophus    Age-related osteoporosis without current pathological fracture    Umbilical hernia without obstruction and without gangrene    Mixed hyperlipidemia    Morbid obesity, unspecified obesity type    Stenosis of right carotid artery    being managed by PCP and specialists who is here today for evaluation of BLE edema and varicose veins.    -Asymptomatic carotid occlusive disease, bilateral R/L - rec R CEA under regional anesthesia vs carotid stent  -Needs increase in Atorvastatin to 80 mg daily  -BLE edema improved - rec cont compression with Rx stockings (new Rx given 9/27/18), elevation, dietary changes associated with water and sodium intake discussed at length with patient  -Will call with plan of care    I spent 20 minutes evaluating this patient and greater than 50% of the time was spent counseling, coordinator care and discussing the plan of care.  All questions were answered and patient stated understanding with agreement with the above treatment plan.    Leoncio Shaver MD [facility]  Vascular and Endovascular Surgery

## 2018-11-24 NOTE — PROGRESS NOTES
Routine Office Visit    Patient Name: Johanna Mancini    : 1950  MRN: [id number]    Subjective:  Johanna is a 68 y.o. female who presents today for:    1. Cough  Patient presenting today with several days of shortness of breath, wheezing, and productive cough.  She suffers with COPD and is O2 dependent.  She has not felt dizzy or light headead.  She states that she was concerned due to the cough and didn't want it turning to pneumonia.  She has been taking all medications as prescribed.  There has been no fevers, chills, myalgias, or sweats.      Past Medical History  Past Medical History:   Diagnosis Date    Articular gout     Carotid artery occlusion     CHF (congestive heart failure)     COPD (chronic obstructive pulmonary disease)     Diabetes mellitus, type 2     Hyperlipidemia     Hypertension     Osteopetrosis     Osteoporosis     Sleep apnea        Past Surgical History  Past Surgical History:   Procedure Laterality Date    CHOLECYSTECTOMY      HYSTERECTOMY      MINERVA equivalent       Family History  Family History   Problem Relation Age of Onset    Heart disease Mother     Cancer Father     Cancer Sister         breast       Social History  Social History     Socioeconomic History    Marital status:      Spouse name: Not on file    Number of children: Not on file    Years of education: Not on file    Highest education level: Not on file   Social Needs    Financial resource strain: Not on file    Food insecurity - worry: Not on file    Food insecurity - inability: Not on file    Transportation needs - medical: Not on file    Transportation needs - non-medical: Not on file   Occupational History    Not on file   Tobacco Use    Smoking status: Former Smoker    Smokeless tobacco: Never Used   Substance and Sexual Activity    Alcohol use: No    Drug use: No    Sexual activity: Not on file   Other Topics Concern    Not on file   Social History Narrative    .  Three  children. Former smoker, quit 15 yr ago.  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        Current Medications  Current Outpatient Medications on File Prior to Visit   Medication Sig Dispense Refill    albuterol (PROVENTIL) 2.5 mg /3 mL (0.083 %) nebulizer solution Take 3 mLs (2.5 mg total) by nebulization every 6 (six) hours as needed for Wheezing. 300 mL 1    allopurinol (ZYLOPRIM) 100 MG tablet Take 1 tablet (100 mg total) by mouth once daily. 90 tablet 1    aspirin (ECOTRIN) 81 MG EC tablet Take 1 tablet (81 mg total) by mouth once daily. 90 tablet 3    atorvastatin (LIPITOR) 40 MG tablet Take 1 tablet (40 mg total) by mouth once daily. 90 tablet 3    blood sugar diagnostic Strp TEST BLOOD SUGARS  TWICE DAILY FOR TRUMETRIX GLUCOMETER 200 strip 0    blood-glucose meter Misc TEST BLOOD SUGAR AS DIRECTED WITH TRUE METRIX MACHINE 1 each 0    budesonide-formoterol 160-4.5 mcg (SYMBICORT) 160-4.5 mcg/actuation HFAA Inhale into the lungs.      CARTIA  mg Cp24 TAKE ONE CAPSULE BY MOUTH ONCE DAILY 90 capsule 3    cloNIDine (CATAPRES) 0.1 MG tablet TAKE 1 TABLET(0.1 MG) BY MOUTH TWICE DAILY 180 tablet 0    diltiaZEM (CARDIZEM CD) 120 MG Cp24 Take 1 capsule (120 mg total) by mouth once daily. 90 capsule 1    hydroCHLOROthiazide (HYDRODIURIL) 25 MG tablet TAKE 1 TABLET(25 MG) BY MOUTH EVERY DAY 90 tablet 0    ibuprofen (ADVIL,MOTRIN) 200 MG tablet Take 1 tablet (200 mg total) by mouth every 6 (six) hours as needed.      ipratropium (ATROVENT) 0.02 % nebulizer solution Inhale into the lungs.      lancets (ONETOUCH ULTRASOFT LANCETS) Misc USE WITH TRUE METRIX METER AND TESTS STRIPS twice daily 200 each 0    lisinopril (PRINIVIL,ZESTRIL) 2.5 MG tablet TAKE 1 TABLET(2.5 MG) BY MOUTH EVERY DAY 90 tablet 0    metFORMIN (GLUCOPHAGE) 500 MG tablet TAKE 1 TABLET(500 MG) BY MOUTH DAILY WITH BREAKFAST 90 tablet 0    theophylline (THEODUR) 300 MG 12 hr tablet Take 1 tablet (300 mg total) by mouth 2 (two) times  "daily. 180 tablet 1     No current facility-administered medications on file prior to visit.        Allergies   Review of patient's allergies indicates:   Allergen Reactions    Tylenol [acetaminophen] Anaphylaxis    Levofloxacin Itching       Review of Systems (Pertinent positives)  Review of Systems   Constitutional: Positive for malaise/fatigue.   HENT: Positive for congestion.    Eyes: Negative.    Respiratory: Positive for cough, sputum production, shortness of breath and wheezing. Negative for hemoptysis.    Cardiovascular: Negative.    Gastrointestinal: Negative.    Skin: Negative.    Neurological: Negative.          /78 (BP Location: Right arm, Patient Position: Sitting, BP Method: Large (Automatic))   Pulse 96   Temp 98.4 °F (36.9 °C) (Oral)   Resp 16   Ht 5' 3" (1.6 m)   Wt 112.2 kg (247 lb 5.7 oz)   SpO2 (!) 93%   BMI 43.82 kg/m²     GENERAL APPEARANCE: in no apparent distress and well developed and well nourished  HEENT: PERR, EOMI, Sclera clear, anicteric, Oropharynx clear, no lesions, Neck supple with midline trachea  NECK: normal, supple, no adenopathy, thyroid normal in size  RESPIRATORY: appears to not feel well with diffuse wheezing, but no crackels; no retractions  HEART: regular rate and rhythm, S1, S2 normal, no murmur, click, rub or gallop.    ABDOMEN: abdomen is soft without tenderness, no masses, no hernias, no organomegaly, no rebound, no guarding. Suprapubic tenderness absent. No CVA tenderness.  SKIN: no rashes, no wounds, no other lesions  PSYCH: Alert, oriented x 3, thought content appropriate, speech normal, pleasant and cooperative, good eye contact, well groomed    Assessment/Plan:  Johanna Mancini is a 68 y.o. female who presents today for :    Johanna was seen today for chest congestion and cough.    Diagnoses and all orders for this visit:    Acute exacerbation of chronic obstructive pulmonary disease (COPD)  -     doxycycline (VIBRAMYCIN) 100 MG Cap; Take 1 capsule " (100 mg total) by mouth every 12 (twelve) hours. for 10 days  -     predniSONE (DELTASONE) 20 MG tablet; Take 2 tablets (40 mg total) by mouth once daily. for 5 days      1.  Patient has nebulizers at home and will use as prescribed  2.  Steroid for inflammation  3.  Start doxy given heart history  4.  Go to the ED should symptoms worsen  5.  Call with any concerns      Johnson Mcintosh MD

## 2018-11-28 DIAGNOSIS — E11.9 TYPE 2 DIABETES MELLITUS WITHOUT COMPLICATION: ICD-10-CM

## 2018-12-03 DIAGNOSIS — I65.23 CAROTID STENOSIS, ASYMPTOMATIC, BILATERAL: Primary | ICD-10-CM

## 2018-12-06 ENCOUNTER — PATIENT OUTREACH (OUTPATIENT)
Dept: ADMINISTRATIVE | Facility: HOSPITAL | Age: 68
End: 2018-12-06

## 2018-12-06 ENCOUNTER — TELEPHONE (OUTPATIENT)
Dept: FAMILY MEDICINE | Facility: CLINIC | Age: 68
End: 2018-12-06

## 2018-12-06 NOTE — TELEPHONE ENCOUNTER
----- Message from Flavio Velazquez sent at 12/6/2018  2:15 PM CST -----  Contact: Self/[phone]  The patient is requesting to speak to the staff. She didn't give any details.        Thank you

## 2018-12-06 NOTE — TELEPHONE ENCOUNTER
"Patient states she had blood in her stool on a test. Patient requested to see Dr. Mcintosh on this coming Monday. Dr. Mcintosh does not have any availability at that time. Patient was offered and appointment with another provider or Dr. De La Garza 1st available. Patient refused stating "nevermind" she didn't want to be in the hospital for the holidays. No further questions at this time.   "

## 2018-12-07 NOTE — TELEPHONE ENCOUNTER
Patients abnormal fitkit was from April of this year.  Results were sent on April 10.  If she is seeing more blood at this time then she needs labs.      Thanks  Dr. Mcintosh

## 2018-12-07 NOTE — TELEPHONE ENCOUNTER
"No answer, LVM instructing patient to contact the clinic regarding her previous message about "blood in stool".      *Does patient currently have blood in stool?    *If so, patient will need labs drawn.   "

## 2018-12-14 DIAGNOSIS — E11.9 TYPE 2 DIABETES MELLITUS WITHOUT COMPLICATION, WITHOUT LONG-TERM CURRENT USE OF INSULIN: ICD-10-CM

## 2018-12-14 DIAGNOSIS — I10 HYPERTENSION, ESSENTIAL: ICD-10-CM

## 2018-12-16 RX ORDER — METFORMIN HYDROCHLORIDE 500 MG/1
TABLET ORAL
Qty: 90 TABLET | Refills: 0 | Status: SHIPPED | OUTPATIENT
Start: 2018-12-16 | End: 2019-03-13 | Stop reason: SDUPTHER

## 2018-12-16 RX ORDER — CLONIDINE HYDROCHLORIDE 0.1 MG/1
TABLET ORAL
Qty: 180 TABLET | Refills: 0 | Status: SHIPPED | OUTPATIENT
Start: 2018-12-16 | End: 2019-03-13 | Stop reason: SDUPTHER

## 2018-12-16 RX ORDER — LISINOPRIL 2.5 MG/1
TABLET ORAL
Qty: 90 TABLET | Refills: 0 | Status: SHIPPED | OUTPATIENT
Start: 2018-12-16 | End: 2019-03-13 | Stop reason: SDUPTHER

## 2018-12-16 RX ORDER — HYDROCHLOROTHIAZIDE 25 MG/1
TABLET ORAL
Qty: 90 TABLET | Refills: 0 | Status: SHIPPED | OUTPATIENT
Start: 2018-12-16 | End: 2019-03-13 | Stop reason: SDUPTHER

## 2018-12-19 DIAGNOSIS — Z12.11 COLON CANCER SCREENING: Primary | ICD-10-CM

## 2019-01-01 ENCOUNTER — TELEPHONE (OUTPATIENT)
Dept: FAMILY MEDICINE | Facility: CLINIC | Age: 69
End: 2019-01-01

## 2019-01-01 ENCOUNTER — HOSPITAL ENCOUNTER (EMERGENCY)
Facility: HOSPITAL | Age: 69
Discharge: HOME | End: 2019-10-09
Attending: EMERGENCY MEDICINE
Payer: MEDICARE

## 2019-01-01 ENCOUNTER — HOSPITAL ENCOUNTER (OUTPATIENT)
Dept: CARDIOLOGY | Facility: HOSPITAL | Age: 69
Discharge: HOME | End: 2019-12-17
Attending: INTERNAL MEDICINE
Payer: MEDICARE

## 2019-01-01 ENCOUNTER — EXTERNAL HOME HEALTH (OUTPATIENT)
Dept: HOME HEALTH SERVICES | Facility: HOSPITAL | Age: 69
End: 2019-01-01
Payer: MEDICARE

## 2019-01-01 ENCOUNTER — HOSPITAL ENCOUNTER (EMERGENCY)
Facility: HOSPITAL | Age: 69
Discharge: HOME | End: 2019-12-27
Attending: EMERGENCY MEDICINE
Payer: MEDICARE

## 2019-01-01 ENCOUNTER — OFFICE VISIT (OUTPATIENT)
Dept: CARDIOLOGY | Facility: CLINIC | Age: 69
End: 2019-01-01
Payer: MEDICARE

## 2019-01-01 ENCOUNTER — PATIENT OUTREACH (OUTPATIENT)
Dept: ADMINISTRATIVE | Facility: OTHER | Age: 69
End: 2019-01-01

## 2019-01-01 ENCOUNTER — TELEPHONE (OUTPATIENT)
Dept: HOME HEALTH SERVICES | Facility: HOSPITAL | Age: 69
End: 2019-01-01

## 2019-01-01 ENCOUNTER — PATIENT OUTREACH (OUTPATIENT)
Dept: ADMINISTRATIVE | Facility: HOSPITAL | Age: 69
End: 2019-01-01

## 2019-01-01 ENCOUNTER — OFFICE VISIT (OUTPATIENT)
Dept: FAMILY MEDICINE | Facility: CLINIC | Age: 69
End: 2019-01-01
Payer: MEDICARE

## 2019-01-01 VITALS
WEIGHT: 256 LBS | BODY MASS INDEX: 47.11 KG/M2 | SYSTOLIC BLOOD PRESSURE: 142 MMHG | HEIGHT: 62 IN | OXYGEN SATURATION: 99 % | DIASTOLIC BLOOD PRESSURE: 82 MMHG | HEART RATE: 95 BPM | RESPIRATION RATE: 20 BRPM | TEMPERATURE: 98 F

## 2019-01-01 VITALS
BODY MASS INDEX: 47.84 KG/M2 | OXYGEN SATURATION: 98 % | WEIGHT: 260 LBS | SYSTOLIC BLOOD PRESSURE: 132 MMHG | HEIGHT: 62 IN | SYSTOLIC BLOOD PRESSURE: 117 MMHG | WEIGHT: 257.06 LBS | HEART RATE: 98 BPM | BODY MASS INDEX: 47.3 KG/M2 | HEART RATE: 105 BPM | HEIGHT: 62 IN | DIASTOLIC BLOOD PRESSURE: 55 MMHG | TEMPERATURE: 98 F | OXYGEN SATURATION: 90 % | RESPIRATION RATE: 15 BRPM | DIASTOLIC BLOOD PRESSURE: 70 MMHG | RESPIRATION RATE: 16 BRPM

## 2019-01-01 VITALS
DIASTOLIC BLOOD PRESSURE: 78 MMHG | RESPIRATION RATE: 17 BRPM | OXYGEN SATURATION: 86 % | BODY MASS INDEX: 45.5 KG/M2 | SYSTOLIC BLOOD PRESSURE: 137 MMHG | HEIGHT: 63 IN | HEART RATE: 87 BPM | WEIGHT: 256.81 LBS | TEMPERATURE: 98 F

## 2019-01-01 DIAGNOSIS — E11.9 TYPE 2 DIABETES MELLITUS WITHOUT COMPLICATION, WITHOUT LONG-TERM CURRENT USE OF INSULIN: ICD-10-CM

## 2019-01-01 DIAGNOSIS — R06.02 SOB (SHORTNESS OF BREATH): ICD-10-CM

## 2019-01-01 DIAGNOSIS — J44.1 COPD WITH ACUTE EXACERBATION: Primary | ICD-10-CM

## 2019-01-01 DIAGNOSIS — E78.2 MIXED HYPERLIPIDEMIA: ICD-10-CM

## 2019-01-01 DIAGNOSIS — J43.1 PANLOBULAR EMPHYSEMA: ICD-10-CM

## 2019-01-01 DIAGNOSIS — I48.19 PERSISTENT ATRIAL FIBRILLATION: Primary | ICD-10-CM

## 2019-01-01 DIAGNOSIS — R10.9 ABDOMINAL PAIN: ICD-10-CM

## 2019-01-01 DIAGNOSIS — I48.19 PERSISTENT ATRIAL FIBRILLATION: ICD-10-CM

## 2019-01-01 DIAGNOSIS — R07.9 CHEST PAIN: ICD-10-CM

## 2019-01-01 DIAGNOSIS — E66.01 MORBID OBESITY, UNSPECIFIED OBESITY TYPE: ICD-10-CM

## 2019-01-01 DIAGNOSIS — Z99.81 HYPOXEMIA REQUIRING SUPPLEMENTAL OXYGEN: ICD-10-CM

## 2019-01-01 DIAGNOSIS — R10.9 ABDOMINAL PAIN, UNSPECIFIED ABDOMINAL LOCATION: Primary | ICD-10-CM

## 2019-01-01 DIAGNOSIS — R09.02 HYPOXEMIA REQUIRING SUPPLEMENTAL OXYGEN: ICD-10-CM

## 2019-01-01 DIAGNOSIS — M25.512 ACUTE PAIN OF LEFT SHOULDER: Primary | ICD-10-CM

## 2019-01-01 DIAGNOSIS — I10 HYPERTENSION, ESSENTIAL: ICD-10-CM

## 2019-01-01 LAB
ALBUMIN SERPL BCP-MCNC: 3.5 G/DL (ref 3.5–5.2)
ALP SERPL-CCNC: 123 U/L (ref 55–135)
ALT SERPL W/O P-5'-P-CCNC: 35 U/L (ref 10–44)
ANION GAP SERPL CALC-SCNC: 6 MMOL/L (ref 8–16)
AORTIC ROOT ANNULUS: 2.64 CM
AORTIC VALVE CUSP SEPERATION: 1.98 CM
ASCENDING AORTA: 2.82 CM
AST SERPL-CCNC: 25 U/L (ref 10–40)
AV INDEX (PROSTH): 0.83
AV MEAN GRADIENT: 7 MMHG
AV PEAK GRADIENT: 12 MMHG
AV VALVE AREA: 2.35 CM2
AV VELOCITY RATIO: 0.79
BASOPHILS # BLD AUTO: 0.03 K/UL (ref 0–0.2)
BASOPHILS NFR BLD: 0.3 % (ref 0–1.9)
BILIRUB SERPL-MCNC: 0.5 MG/DL (ref 0.1–1)
BNP SERPL-MCNC: 194 PG/ML (ref 0–99)
BUN SERPL-MCNC: 15 MG/DL (ref 8–23)
CALCIUM SERPL-MCNC: 9.7 MG/DL (ref 8.7–10.5)
CHLORIDE SERPL-SCNC: 100 MMOL/L (ref 95–110)
CO2 SERPL-SCNC: 36 MMOL/L (ref 23–29)
CREAT SERPL-MCNC: 0.7 MG/DL (ref 0.5–1.4)
CRP SERPL-MCNC: 0.9 MG/L (ref 0–8.2)
CV ECHO LV RWT: 0.63 CM
DIFFERENTIAL METHOD: ABNORMAL
DOP CALC AO PEAK VEL: 1.74 M/S
DOP CALC AO VTI: 33.03 CM
DOP CALC LVOT AREA: 2.8 CM2
DOP CALC LVOT DIAMETER: 1.9 CM
DOP CALC LVOT PEAK VEL: 1.37 M/S
DOP CALC LVOT STROKE VOLUME: 77.68 CM3
DOP CALCLVOT PEAK VEL VTI: 27.41 CM
E WAVE DECELERATION TIME: 173.13 MSEC
E/A RATIO: 3.47
E/E' RATIO: 9.78 M/S
ECHO LV POSTERIOR WALL: 1.29 CM (ref 0.6–1.1)
EOSINOPHIL # BLD AUTO: 0.2 K/UL (ref 0–0.5)
EOSINOPHIL NFR BLD: 1.7 % (ref 0–8)
ERYTHROCYTE [DISTWIDTH] IN BLOOD BY AUTOMATED COUNT: 16.2 % (ref 11.5–14.5)
ERYTHROCYTE [SEDIMENTATION RATE] IN BLOOD BY WESTERGREN METHOD: 2 MM/HR (ref 0–20)
EST. GFR  (AFRICAN AMERICAN): >60 ML/MIN/1.73 M^2
EST. GFR  (NON AFRICAN AMERICAN): >60 ML/MIN/1.73 M^2
FRACTIONAL SHORTENING: 51 % (ref 28–44)
GLUCOSE SERPL-MCNC: 116 MG/DL (ref 70–110)
HCT VFR BLD AUTO: 43.5 % (ref 37–48.5)
HGB BLD-MCNC: 12.6 G/DL (ref 12–16)
IMM GRANULOCYTES # BLD AUTO: 0.03 K/UL (ref 0–0.04)
IMM GRANULOCYTES NFR BLD AUTO: 0.3 % (ref 0–0.5)
INTERVENTRICULAR SEPTUM: 1.32 CM (ref 0.6–1.1)
IVRT: 0.07 MSEC
LA MAJOR: 6.21 CM
LA MINOR: 5.89 CM
LA WIDTH: 3.33 CM
LEFT ATRIUM SIZE: 4.53 CM
LEFT ATRIUM VOLUME: 77.52 CM3
LEFT INTERNAL DIMENSION IN SYSTOLE: 2.03 CM (ref 2.1–4)
LEFT VENTRICLE DIASTOLIC VOLUME: 75.05 ML
LEFT VENTRICLE SYSTOLIC VOLUME: 13.29 ML
LEFT VENTRICULAR INTERNAL DIMENSION IN DIASTOLE: 4.12 CM (ref 3.5–6)
LEFT VENTRICULAR MASS: 196.03 G
LV LATERAL E/E' RATIO: 8.25 M/S
LV SEPTAL E/E' RATIO: 12 M/S
LYMPHOCYTES # BLD AUTO: 1.2 K/UL (ref 1–4.8)
LYMPHOCYTES NFR BLD: 12.2 % (ref 18–48)
MCH RBC QN AUTO: 26.6 PG (ref 27–31)
MCHC RBC AUTO-ENTMCNC: 29 G/DL (ref 32–36)
MCV RBC AUTO: 92 FL (ref 82–98)
MONOCYTES # BLD AUTO: 1.1 K/UL (ref 0.3–1)
MONOCYTES NFR BLD: 11.4 % (ref 4–15)
MV PEAK A VEL: 0.38 M/S
MV PEAK E VEL: 1.32 M/S
NEUTROPHILS # BLD AUTO: 7.3 K/UL (ref 1.8–7.7)
NEUTROPHILS NFR BLD: 74.1 % (ref 38–73)
NRBC BLD-RTO: 0 /100 WBC
OHS CV EVENT MONITOR DAY: 1
OHS CV HOLTER LENGTH DECIMAL HOURS: 47.98
OHS CV HOLTER LENGTH HOURS: 23
OHS CV HOLTER LENGTH MINUTES: 59
PISA TR MAX VEL: 3.41 M/S
PLATELET # BLD AUTO: 174 K/UL (ref 150–350)
PMV BLD AUTO: 11.2 FL (ref 9.2–12.9)
POTASSIUM SERPL-SCNC: 4.4 MMOL/L (ref 3.5–5.1)
PROT SERPL-MCNC: 6.8 G/DL (ref 6–8.4)
PULM VEIN S/D RATIO: 0.61
PV PEAK D VEL: 0.51 M/S
PV PEAK S VEL: 0.31 M/S
PV PEAK VELOCITY: 1.24 CM/S
RA MAJOR: 6.11 CM
RA PRESSURE: 3 MMHG
RA WIDTH: 3.22 CM
RBC # BLD AUTO: 4.73 M/UL (ref 4–5.4)
RIGHT VENTRICULAR END-DIASTOLIC DIMENSION: 4.03 CM
RV TISSUE DOPPLER FREE WALL SYSTOLIC VELOCITY 1 (APICAL 4 CHAMBER VIEW): 11.15 CM/S
SINUS: 2.27 CM
SODIUM SERPL-SCNC: 142 MMOL/L (ref 136–145)
STJ: 1.94 CM
TDI LATERAL: 0.16 M/S
TDI SEPTAL: 0.11 M/S
TDI: 0.14 M/S
TR MAX PG: 47 MMHG
TRICUSPID ANNULAR PLANE SYSTOLIC EXCURSION: 1.69 CM
TROPONIN I SERPL DL<=0.01 NG/ML-MCNC: 0.02 NG/ML (ref 0–0.03)
TROPONIN I SERPL DL<=0.01 NG/ML-MCNC: 0.03 NG/ML (ref 0–0.03)
TV REST PULMONARY ARTERY PRESSURE: 50 MMHG
WBC # BLD AUTO: 9.91 K/UL (ref 3.9–12.7)

## 2019-01-01 PROCEDURE — 93005 ELECTROCARDIOGRAM TRACING: CPT

## 2019-01-01 PROCEDURE — 85025 COMPLETE CBC W/AUTO DIFF WBC: CPT

## 2019-01-01 PROCEDURE — 99213 OFFICE O/P EST LOW 20 MIN: CPT | Mod: PBBFAC,25 | Performed by: INTERNAL MEDICINE

## 2019-01-01 PROCEDURE — 84484 ASSAY OF TROPONIN QUANT: CPT | Mod: 91

## 2019-01-01 PROCEDURE — 86140 C-REACTIVE PROTEIN: CPT

## 2019-01-01 PROCEDURE — 93306 TTE W/DOPPLER COMPLETE: CPT

## 2019-01-01 PROCEDURE — 99999 PR PBB SHADOW E&M-EST. PATIENT-LVL III: CPT | Mod: PBBFAC,,, | Performed by: FAMILY MEDICINE

## 2019-01-01 PROCEDURE — 99285 EMERGENCY DEPT VISIT HI MDM: CPT | Mod: 25

## 2019-01-01 PROCEDURE — 1159F PR MEDICATION LIST DOCUMENTED IN MEDICAL RECORD: ICD-10-PCS | Mod: ,,, | Performed by: INTERNAL MEDICINE

## 2019-01-01 PROCEDURE — 93010 ELECTROCARDIOGRAM REPORT: CPT | Mod: S$PBB,,, | Performed by: INTERNAL MEDICINE

## 2019-01-01 PROCEDURE — 93226 XTRNL ECG REC<48 HR SCAN A/R: CPT

## 2019-01-01 PROCEDURE — 93306 ECHO (CUPID ONLY): ICD-10-PCS | Mod: 26,,, | Performed by: INTERNAL MEDICINE

## 2019-01-01 PROCEDURE — 25500020 PHARM REV CODE 255: Performed by: EMERGENCY MEDICINE

## 2019-01-01 PROCEDURE — 96374 THER/PROPH/DIAG INJ IV PUSH: CPT

## 2019-01-01 PROCEDURE — 93227 HOLTER MONITOR - 24 HOUR (CUPID ONLY): ICD-10-PCS | Mod: ,,, | Performed by: INTERNAL MEDICINE

## 2019-01-01 PROCEDURE — 93010 ELECTROCARDIOGRAM REPORT: CPT | Mod: ,,, | Performed by: INTERNAL MEDICINE

## 2019-01-01 PROCEDURE — 93227 XTRNL ECG REC<48 HR R&I: CPT | Mod: ,,, | Performed by: INTERNAL MEDICINE

## 2019-01-01 PROCEDURE — 99213 OFFICE O/P EST LOW 20 MIN: CPT | Mod: PBBFAC,PN | Performed by: FAMILY MEDICINE

## 2019-01-01 PROCEDURE — 80053 COMPREHEN METABOLIC PANEL: CPT

## 2019-01-01 PROCEDURE — 1126F PR PAIN SEVERITY QUANTIFIED, NO PAIN PRESENT: ICD-10-PCS | Mod: ,,, | Performed by: INTERNAL MEDICINE

## 2019-01-01 PROCEDURE — 99999 PR PBB SHADOW E&M-EST. PATIENT-LVL III: ICD-10-PCS | Mod: PBBFAC,,, | Performed by: INTERNAL MEDICINE

## 2019-01-01 PROCEDURE — 83880 ASSAY OF NATRIURETIC PEPTIDE: CPT

## 2019-01-01 PROCEDURE — G0179 MD RECERTIFICATION HHA PT: HCPCS | Mod: ,,, | Performed by: FAMILY MEDICINE

## 2019-01-01 PROCEDURE — 93010 EKG 12-LEAD: ICD-10-PCS | Mod: S$PBB,,, | Performed by: INTERNAL MEDICINE

## 2019-01-01 PROCEDURE — 93005 ELECTROCARDIOGRAM TRACING: CPT | Mod: PBBFAC | Performed by: INTERNAL MEDICINE

## 2019-01-01 PROCEDURE — G0179 PR HOME HEALTH MD RECERTIFICATION: ICD-10-PCS | Mod: ,,, | Performed by: FAMILY MEDICINE

## 2019-01-01 PROCEDURE — 99214 PR OFFICE/OUTPT VISIT, EST, LEVL IV, 30-39 MIN: ICD-10-PCS | Mod: S$PBB,,, | Performed by: INTERNAL MEDICINE

## 2019-01-01 PROCEDURE — 99284 EMERGENCY DEPT VISIT MOD MDM: CPT | Mod: 25

## 2019-01-01 PROCEDURE — 93010 EKG 12-LEAD: ICD-10-PCS | Mod: ,,, | Performed by: INTERNAL MEDICINE

## 2019-01-01 PROCEDURE — 93306 TTE W/DOPPLER COMPLETE: CPT | Mod: 26,,, | Performed by: INTERNAL MEDICINE

## 2019-01-01 PROCEDURE — 99999 PR PBB SHADOW E&M-EST. PATIENT-LVL III: CPT | Mod: PBBFAC,,, | Performed by: INTERNAL MEDICINE

## 2019-01-01 PROCEDURE — 85652 RBC SED RATE AUTOMATED: CPT

## 2019-01-01 PROCEDURE — 99214 PR OFFICE/OUTPT VISIT, EST, LEVL IV, 30-39 MIN: ICD-10-PCS | Mod: S$PBB,,, | Performed by: FAMILY MEDICINE

## 2019-01-01 PROCEDURE — 1159F MED LIST DOCD IN RCRD: CPT | Mod: ,,, | Performed by: INTERNAL MEDICINE

## 2019-01-01 PROCEDURE — 99214 OFFICE O/P EST MOD 30 MIN: CPT | Mod: S$PBB,,, | Performed by: FAMILY MEDICINE

## 2019-01-01 PROCEDURE — 1126F AMNT PAIN NOTED NONE PRSNT: CPT | Mod: ,,, | Performed by: INTERNAL MEDICINE

## 2019-01-01 PROCEDURE — 84484 ASSAY OF TROPONIN QUANT: CPT

## 2019-01-01 PROCEDURE — 63600175 PHARM REV CODE 636 W HCPCS: Performed by: EMERGENCY MEDICINE

## 2019-01-01 PROCEDURE — 99999 PR PBB SHADOW E&M-EST. PATIENT-LVL III: ICD-10-PCS | Mod: PBBFAC,,, | Performed by: FAMILY MEDICINE

## 2019-01-01 PROCEDURE — 99214 OFFICE O/P EST MOD 30 MIN: CPT | Mod: S$PBB,,, | Performed by: INTERNAL MEDICINE

## 2019-01-01 RX ORDER — FUROSEMIDE 40 MG/1
TABLET ORAL
Qty: 15 TABLET | Refills: 2 | Status: SHIPPED | OUTPATIENT
Start: 2019-01-01 | End: 2019-01-01 | Stop reason: SDUPTHER

## 2019-01-01 RX ORDER — FUROSEMIDE 40 MG/1
40 TABLET ORAL DAILY
Qty: 90 TABLET | Refills: 3 | Status: SHIPPED | OUTPATIENT
Start: 2019-01-01

## 2019-01-01 RX ORDER — DILTIAZEM HYDROCHLORIDE 240 MG/1
480 CAPSULE, COATED, EXTENDED RELEASE ORAL DAILY
Qty: 180 CAPSULE | Refills: 3 | Status: SHIPPED | OUTPATIENT
Start: 2019-01-01 | End: 2020-01-01

## 2019-01-01 RX ORDER — DOXYCYCLINE 100 MG/1
100 CAPSULE ORAL EVERY 12 HOURS
Qty: 20 CAPSULE | Refills: 0 | Status: SHIPPED | OUTPATIENT
Start: 2019-01-01 | End: 2019-01-01

## 2019-01-01 RX ORDER — LANCETS
EACH MISCELLANEOUS
Qty: 100 EACH | Refills: 1 | Status: SHIPPED | OUTPATIENT
Start: 2019-01-01

## 2019-01-01 RX ORDER — PREDNISONE 20 MG/1
60 TABLET ORAL DAILY
Qty: 15 TABLET | Refills: 0 | Status: SHIPPED | OUTPATIENT
Start: 2019-01-01 | End: 2019-01-01

## 2019-01-01 RX ORDER — THEOPHYLLINE 300 MG/1
TABLET, EXTENDED RELEASE ORAL
Qty: 180 TABLET | Refills: 0 | Status: SHIPPED | OUTPATIENT
Start: 2019-01-01 | End: 2020-01-01

## 2019-01-01 RX ORDER — ATORVASTATIN CALCIUM 80 MG/1
TABLET, FILM COATED ORAL
Qty: 90 TABLET | Refills: 3 | Status: SHIPPED | OUTPATIENT
Start: 2019-01-01

## 2019-01-01 RX ORDER — KETOROLAC TROMETHAMINE 30 MG/ML
15 INJECTION, SOLUTION INTRAMUSCULAR; INTRAVENOUS
Status: COMPLETED | OUTPATIENT
Start: 2019-01-01 | End: 2019-01-01

## 2019-01-01 RX ORDER — ALBUTEROL SULFATE 0.83 MG/ML
2.5 SOLUTION RESPIRATORY (INHALATION) EVERY 6 HOURS PRN
Qty: 300 ML | Refills: 1 | Status: SHIPPED | OUTPATIENT
Start: 2019-01-01 | End: 2019-01-01 | Stop reason: SDUPTHER

## 2019-01-01 RX ORDER — METFORMIN HYDROCHLORIDE 500 MG/1
TABLET ORAL
Qty: 90 TABLET | Refills: 0 | Status: SHIPPED | OUTPATIENT
Start: 2019-01-01 | End: 2020-01-01

## 2019-01-01 RX ORDER — DICYCLOMINE HYDROCHLORIDE 20 MG/1
20 TABLET ORAL 2 TIMES DAILY
Qty: 20 TABLET | Refills: 0 | Status: SHIPPED | OUTPATIENT
Start: 2019-01-01 | End: 2019-01-01

## 2019-01-01 RX ORDER — ALBUTEROL SULFATE 0.83 MG/ML
2.5 SOLUTION RESPIRATORY (INHALATION) EVERY 6 HOURS PRN
Qty: 300 ML | Refills: 1 | Status: SHIPPED | OUTPATIENT
Start: 2019-01-01

## 2019-01-01 RX ORDER — FUROSEMIDE 40 MG/1
20 TABLET ORAL DAILY
Refills: 1 | COMMUNITY
Start: 2019-01-01 | End: 2019-01-01 | Stop reason: SDUPTHER

## 2019-01-01 RX ORDER — SIMETHICONE 125 MG
125 CAPSULE ORAL 4 TIMES DAILY PRN
Qty: 30 CAPSULE | Refills: 0 | Status: SHIPPED | OUTPATIENT
Start: 2019-01-01

## 2019-01-01 RX ORDER — OXYCODONE HYDROCHLORIDE 5 MG/1
2.5 TABLET ORAL EVERY 4 HOURS PRN
Qty: 6 TABLET | Refills: 0 | Status: SHIPPED | OUTPATIENT
Start: 2019-01-01 | End: 2019-01-01

## 2019-01-01 RX ORDER — ASPIRIN 325 MG
325 TABLET ORAL
Status: DISCONTINUED | OUTPATIENT
Start: 2019-01-01 | End: 2019-01-01 | Stop reason: HOSPADM

## 2019-01-01 RX ORDER — IBUPROFEN 600 MG/1
600 TABLET ORAL EVERY 6 HOURS PRN
Qty: 16 TABLET | Refills: 0 | Status: SHIPPED | OUTPATIENT
Start: 2019-01-01 | End: 2019-01-01

## 2019-01-01 RX ORDER — INSULIN PUMP SYRINGE, 3 ML
EACH MISCELLANEOUS
Qty: 1 EACH | Refills: 0 | Status: SHIPPED | OUTPATIENT
Start: 2019-01-01 | End: 2020-10-03

## 2019-01-01 RX ORDER — PREDNISONE 10 MG/1
TABLET ORAL
Refills: 0 | COMMUNITY
Start: 2019-01-01

## 2019-01-01 RX ADMIN — IOHEXOL 75 ML: 350 INJECTION, SOLUTION INTRAVENOUS at 02:12

## 2019-01-01 RX ADMIN — KETOROLAC TROMETHAMINE 15 MG: 30 INJECTION, SOLUTION INTRAMUSCULAR; INTRAVENOUS at 12:12

## 2019-01-14 ENCOUNTER — PATIENT OUTREACH (OUTPATIENT)
Dept: ADMINISTRATIVE | Facility: HOSPITAL | Age: 69
End: 2019-01-14

## 2019-01-14 NOTE — PROGRESS NOTES
Spoke with pt and she declined having the colonoscopy done. Stated that she has other medical condition's that prevent her from having this test. Discussed the importance of having a colonoscopy. Voiced understanding.

## 2019-02-01 ENCOUNTER — TELEPHONE (OUTPATIENT)
Dept: FAMILY MEDICINE | Facility: CLINIC | Age: 69
End: 2019-02-01

## 2019-02-01 ENCOUNTER — OFFICE VISIT (OUTPATIENT)
Dept: FAMILY MEDICINE | Facility: CLINIC | Age: 69
End: 2019-02-01
Payer: MEDICARE

## 2019-02-01 VITALS
RESPIRATION RATE: 16 BRPM | HEART RATE: 84 BPM | BODY MASS INDEX: 44.53 KG/M2 | HEIGHT: 63 IN | OXYGEN SATURATION: 93 % | TEMPERATURE: 98 F | WEIGHT: 251.31 LBS | DIASTOLIC BLOOD PRESSURE: 72 MMHG | SYSTOLIC BLOOD PRESSURE: 136 MMHG

## 2019-02-01 DIAGNOSIS — E66.01 MORBID OBESITY, UNSPECIFIED OBESITY TYPE: ICD-10-CM

## 2019-02-01 DIAGNOSIS — J44.1 COPD WITH ACUTE EXACERBATION: Primary | ICD-10-CM

## 2019-02-01 DIAGNOSIS — E11.9 TYPE 2 DIABETES MELLITUS WITHOUT COMPLICATION, WITHOUT LONG-TERM CURRENT USE OF INSULIN: ICD-10-CM

## 2019-02-01 DIAGNOSIS — R73.09 HEMOGLOBIN A1C LESS THAN 7.0%: ICD-10-CM

## 2019-02-01 DIAGNOSIS — J43.1 PANLOBULAR EMPHYSEMA: ICD-10-CM

## 2019-02-01 DIAGNOSIS — Z99.81 HYPOXEMIA REQUIRING SUPPLEMENTAL OXYGEN: ICD-10-CM

## 2019-02-01 DIAGNOSIS — R09.02 HYPOXEMIA REQUIRING SUPPLEMENTAL OXYGEN: ICD-10-CM

## 2019-02-01 DIAGNOSIS — J44.1 COPD WITH ACUTE EXACERBATION: ICD-10-CM

## 2019-02-01 LAB
CTP QC/QA: YES
FLUAV AG NPH QL: NEGATIVE
FLUBV AG NPH QL: NEGATIVE

## 2019-02-01 PROCEDURE — 99999 PR PBB SHADOW E&M-EST. PATIENT-LVL III: CPT | Mod: PBBFAC,,, | Performed by: FAMILY MEDICINE

## 2019-02-01 PROCEDURE — 99214 OFFICE O/P EST MOD 30 MIN: CPT | Mod: S$PBB,,, | Performed by: FAMILY MEDICINE

## 2019-02-01 PROCEDURE — 99999 PR PBB SHADOW E&M-EST. PATIENT-LVL III: ICD-10-PCS | Mod: PBBFAC,,, | Performed by: FAMILY MEDICINE

## 2019-02-01 PROCEDURE — 99214 PR OFFICE/OUTPT VISIT, EST, LEVL IV, 30-39 MIN: ICD-10-PCS | Mod: S$PBB,,, | Performed by: FAMILY MEDICINE

## 2019-02-01 PROCEDURE — 87804 INFLUENZA ASSAY W/OPTIC: CPT | Mod: 59,PBBFAC,PN | Performed by: FAMILY MEDICINE

## 2019-02-01 PROCEDURE — 99213 OFFICE O/P EST LOW 20 MIN: CPT | Mod: PBBFAC,PN | Performed by: FAMILY MEDICINE

## 2019-02-01 RX ORDER — PREDNISONE 20 MG/1
60 TABLET ORAL DAILY
Qty: 15 TABLET | Refills: 0 | Status: SHIPPED | OUTPATIENT
Start: 2019-02-01 | End: 2019-02-06

## 2019-02-01 RX ORDER — DOXYCYCLINE 100 MG/1
100 CAPSULE ORAL EVERY 12 HOURS
Qty: 20 CAPSULE | Refills: 0 | Status: SHIPPED | OUTPATIENT
Start: 2019-02-01 | End: 2019-02-01 | Stop reason: SDUPTHER

## 2019-02-01 RX ORDER — PREDNISONE 20 MG/1
60 TABLET ORAL DAILY
Qty: 15 TABLET | Refills: 0 | Status: SHIPPED | OUTPATIENT
Start: 2019-02-01 | End: 2019-02-01 | Stop reason: SDUPTHER

## 2019-02-01 RX ORDER — DOXYCYCLINE 100 MG/1
100 CAPSULE ORAL EVERY 12 HOURS
Qty: 20 CAPSULE | Refills: 0 | Status: SHIPPED | OUTPATIENT
Start: 2019-02-01 | End: 2019-02-11

## 2019-02-01 NOTE — TELEPHONE ENCOUNTER
Prescription changed to her preferred pharmacy on [address]    Thanks  Dr. Mcintosh

## 2019-02-01 NOTE — TELEPHONE ENCOUNTER
----- Message from Jess Khan sent at 2/1/2019  3:23 PM CST -----  Contact: self - [phone]  Pt asking to transfer---predniSONE (DELTASONE) 20 MG tablet--- doxycycline (VIBRAMYCIN) 100 MG Cap--  Pt waiting at the drugs store for medication.         ...  [facility] ---  [address] [facility]  [phone]

## 2019-02-01 NOTE — PROGRESS NOTES
Routine Office Visit    Patient Name: Johanna Mancini    : 1950  MRN: [id number]    Subjective:  Johanna is a 69 y.o. female who presents today for:    1. Cough and body aches  Patient presenting with 2 days of body aches.  She has COPD and states she has been short of breath.  She took breathing treatments today prior to her visit and they did help.  She has not had any fevers, chills, or sweats.  She does feel nauseated. Patient is on continuous O2 due to chronic hypoxia from COPD    Past Medical History  Past Medical History:   Diagnosis Date    Articular gout     Carotid artery occlusion     CHF (congestive heart failure)     COPD (chronic obstructive pulmonary disease)     Diabetes mellitus, type 2     Hyperlipidemia     Hypertension     Osteopetrosis     Osteoporosis     Sleep apnea        Past Surgical History  Past Surgical History:   Procedure Laterality Date    CHOLECYSTECTOMY      HYSTERECTOMY      MINERVA equivalent       Family History  Family History   Problem Relation Age of Onset    Heart disease Mother     Cancer Father     Cancer Sister         breast       Social History  Social History     Socioeconomic History    Marital status:      Spouse name: Not on file    Number of children: Not on file    Years of education: Not on file    Highest education level: Not on file   Social Needs    Financial resource strain: Not on file    Food insecurity - worry: Not on file    Food insecurity - inability: Not on file    Transportation needs - medical: Not on file    Transportation needs - non-medical: Not on file   Occupational History    Not on file   Tobacco Use    Smoking status: Former Smoker    Smokeless tobacco: Never Used   Substance and Sexual Activity    Alcohol use: No    Drug use: No    Sexual activity: Not on file   Other Topics Concern    Not on file   Social History Narrative    .  Three children. Former smoker, quit 15 yr ago.  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        Current Medications  Current Outpatient Medications on File Prior to Visit   Medication Sig Dispense Refill    albuterol (PROVENTIL) 2.5 mg /3 mL (0.083 %) nebulizer solution Take 3 mLs (2.5 mg total) by nebulization every 6 (six) hours as needed for Wheezing. 300 mL 1    allopurinol (ZYLOPRIM) 100 MG tablet Take 1 tablet (100 mg total) by mouth once daily. 90 tablet 1    aspirin (ECOTRIN) 81 MG EC tablet Take 1 tablet (81 mg total) by mouth once daily. 90 tablet 3    atorvastatin (LIPITOR) 40 MG tablet Take 1 tablet (40 mg total) by mouth once daily. 90 tablet 3    blood sugar diagnostic Strp TEST BLOOD SUGARS  TWICE DAILY FOR TRUMETRIX GLUCOMETER 200 strip 0    blood-glucose meter Misc TEST BLOOD SUGAR AS DIRECTED WITH TRUE METRIX MACHINE 1 each 0    budesonide-formoterol 160-4.5 mcg (SYMBICORT) 160-4.5 mcg/actuation HFAA Inhale into the lungs.      CARTIA  mg Cp24 TAKE ONE CAPSULE BY MOUTH ONCE DAILY 90 capsule 3    cloNIDine (CATAPRES) 0.1 MG tablet TAKE 1 TABLET(0.1 MG) BY MOUTH TWICE DAILY 180 tablet 0    diltiaZEM (CARDIZEM CD) 120 MG Cp24 Take 1 capsule (120 mg total) by mouth once daily. 90 capsule 1    hydroCHLOROthiazide (HYDRODIURIL) 25 MG tablet TAKE 1 TABLET(25 MG) BY MOUTH EVERY DAY 90 tablet 0    ibuprofen (ADVIL,MOTRIN) 200 MG tablet Take 1 tablet (200 mg total) by mouth every 6 (six) hours as needed.      ipratropium (ATROVENT) 0.02 % nebulizer solution Inhale into the lungs.      lancets (ONETOUCH ULTRASOFT LANCETS) Misc USE WITH TRUE METRIX METER AND TESTS STRIPS twice daily 200 each 0    lisinopril (PRINIVIL,ZESTRIL) 2.5 MG tablet TAKE 1 TABLET(2.5 MG) BY MOUTH EVERY DAY 90 tablet 0    metFORMIN (GLUCOPHAGE) 500 MG tablet TAKE 1 TABLET(500 MG) BY MOUTH DAILY WITH BREAKFAST 90 tablet 0    theophylline (THEODUR) 300 MG 12 hr tablet Take 1 tablet (300 mg total) by mouth 2 (two) times daily. 180 tablet 1     No current facility-administered medications  "on file prior to visit.        Allergies   Review of patient's allergies indicates:   Allergen Reactions    Tylenol [acetaminophen] Anaphylaxis    Levofloxacin Itching       Review of Systems (Pertinent positives)  Review of Systems   Constitutional: Positive for malaise/fatigue. Negative for fever.   HENT: Positive for congestion.    Eyes: Negative.    Respiratory: Positive for cough, sputum production, shortness of breath and wheezing.    Cardiovascular: Negative.    Gastrointestinal: Negative.    Musculoskeletal: Negative.    Skin: Negative.    Neurological: Negative.          /72 (BP Location: Right arm, Patient Position: Sitting, BP Method: Medium (Manual))   Pulse 84   Temp 98.1 °F (36.7 °C) (Oral)   Resp 16   Ht 5' 3" (1.6 m)   Wt 114 kg (251 lb 5.2 oz)   SpO2 (!) 93%   BMI 44.52 kg/m²     GENERAL APPEARANCE: in no apparent distress and well developed and well nourished  HEENT: PERRLA, EOMI, Sclera clear, anicteric, Oropharynx clear, no lesions, Neck supple with midline trachea  NECK: normal, supple, no adenopathy, thyroid normal in size  RESPIRATORY: appears well, vitals normal, no respiratory distress, acyanotic, normal RR, chest clear, no wheezing, crepitations, rhonchi, normal symmetric air entry  HEART: regular rate and rhythm, S1, S2 normal, no murmur, click, rub or gallop.    ABDOMEN: abdomen is soft without tenderness, no masses, no hernias, no organomegaly, no rebound, no guarding. Suprapubic tenderness absent. No CVA tenderness.  SKIN: no rashes, no wounds, no other lesions  PSYCH: Alert, oriented x 3, thought content appropriate, speech normal, pleasant and cooperative, good eye contact, well groomed    Assessment/Plan:  Johanna Mancini is a 69 y.o. female who presents today for :    Johanna was seen today for cough.    Diagnoses and all orders for this visit:    COPD with acute exacerbation  -     doxycycline (VIBRAMYCIN) 100 MG Cap; Take 1 capsule (100 mg total) by mouth every 12 " (twelve) hours. for 10 days  -     predniSONE (DELTASONE) 20 MG tablet; Take 3 tablets (60 mg total) by mouth once daily. for 5 days    Panlobular emphysema    Hypoxemia requiring supplemental oxygen      1.  Physical exam had showed no respiratory distress as she had already done breathing treatments  2.  Flu swab negative  3.  Call Monday if no improvement  4.  Go to the ED for any worsening symptoms

## 2019-02-12 ENCOUNTER — TELEPHONE (OUTPATIENT)
Dept: VASCULAR SURGERY | Facility: CLINIC | Age: 69
End: 2019-02-12

## 2019-02-12 NOTE — TELEPHONE ENCOUNTER
Called and explained that her appointments where canceled for March 4th and where rescheduled for March 14th. She stated understanding. Explained would mail out appointment slips.

## 2019-02-19 ENCOUNTER — TELEPHONE (OUTPATIENT)
Dept: FAMILY MEDICINE | Facility: CLINIC | Age: 69
End: 2019-02-19

## 2019-02-19 NOTE — TELEPHONE ENCOUNTER
Attempted to contact patient to gain more information on her current symptoms.  No answer. LVM instructing patient to contact the clinic.

## 2019-02-19 NOTE — TELEPHONE ENCOUNTER
----- Message from Dalila Alcaraz sent at 2/19/2019 10:52 AM CST -----  Contact: self  Pt is calling to speak with staff regarding getting something called in for another cold she is getting before it gets worse. Please call pt at [phone]      [facility] [address] in [place]

## 2019-02-20 ENCOUNTER — TELEPHONE (OUTPATIENT)
Dept: FAMILY MEDICINE | Facility: CLINIC | Age: 69
End: 2019-02-20

## 2019-02-20 NOTE — TELEPHONE ENCOUNTER
Please check to see if she is having any shortness of breath or just a cough    Thanks  Dr. Mcintosh

## 2019-02-20 NOTE — TELEPHONE ENCOUNTER
Patient returned call to clinic stating she still has a cold. Patient states that she's coughing up green mucus and she's congested. Patient requesting and antibiotic. Please advise.

## 2019-02-21 ENCOUNTER — TELEPHONE (OUTPATIENT)
Dept: FAMILY MEDICINE | Facility: CLINIC | Age: 69
End: 2019-02-21

## 2019-02-21 NOTE — TELEPHONE ENCOUNTER
A cough does not indicate the need for antibiotics even if she has COPD.  She reported only having occasional shortness of breath.  If she is now having increased shortness of breath, wheezing, cough, and/or fever then she should be seen or go to the ED.  We do not treat just for a cough    Thanks  Dr. Mcintosh

## 2019-02-21 NOTE — TELEPHONE ENCOUNTER
Attempted to contact patient again regarding her cough. Patient was instructed earlier to go to the ED for worsening symptoms. No answer, LVM for patient to contact the clinic.

## 2019-02-21 NOTE — TELEPHONE ENCOUNTER
Contacted pt regarding her being sick pt would like antibiotics sent to pharmacy. Pt states she having congestion and cough

## 2019-02-21 NOTE — TELEPHONE ENCOUNTER
----- Message from Dalila Alcaraz sent at 2/21/2019  9:00 AM CST -----  Contact: self  Pt is calling to speak with staff regarding her being sick and not getting better, please call pt at [phone]

## 2019-02-21 NOTE — TELEPHONE ENCOUNTER
Patient called the [facility]Y [facility] stating she needs an antibiotic called in right now! She states she's been calling the last 3 days and no one has contacted her. Patient states she's just going to go to the ED and not [facility] but [facility]. Patient was advised to go where she needed for help. Please advise.     *SEE PREVIOUS COMMUNICATIONS WITH PATIENT

## 2019-02-22 ENCOUNTER — PATIENT OUTREACH (OUTPATIENT)
Dept: ADMINISTRATIVE | Facility: HOSPITAL | Age: 69
End: 2019-02-22

## 2019-02-22 NOTE — PROGRESS NOTES
A letter was mailed to the patient on today in regards to a positive fit kit for pt to call the office to discuss scheduling a colonoscopy.

## 2019-02-25 ENCOUNTER — TELEPHONE (OUTPATIENT)
Dept: FAMILY MEDICINE | Facility: CLINIC | Age: 69
End: 2019-02-25

## 2019-02-25 NOTE — TELEPHONE ENCOUNTER
----- Message from Kerri Chavez sent at 2/25/2019 12:25 PM CST -----  Contact: Self   Patient states she is spitting up blood which started this morning . Patient refused to be transferred over to [facility] On call and hung up on me. Please call patient at [phone].

## 2019-03-01 ENCOUNTER — TELEPHONE (OUTPATIENT)
Dept: FAMILY MEDICINE | Facility: CLINIC | Age: 69
End: 2019-03-01

## 2019-03-01 NOTE — TELEPHONE ENCOUNTER
----- Message from Kerri Chavez sent at 3/1/2019  1:03 PM CST -----  Contact: Self   Patient says she need orders sent to [facility] in [place]  for Compression stockings and Diabetic shoes. Please call patient at   Fax: [phone].

## 2019-03-04 ENCOUNTER — TELEPHONE (OUTPATIENT)
Dept: FAMILY MEDICINE | Facility: CLINIC | Age: 69
End: 2019-03-04

## 2019-03-04 DIAGNOSIS — I83.813 VARICOSE VEINS OF BILATERAL LOWER EXTREMITIES WITH PAIN: Primary | ICD-10-CM

## 2019-03-04 DIAGNOSIS — M1A.09X0 IDIOPATHIC CHRONIC GOUT OF MULTIPLE SITES WITHOUT TOPHUS: ICD-10-CM

## 2019-03-04 RX ORDER — ALLOPURINOL 100 MG/1
TABLET ORAL
Qty: 8100 TABLET | Refills: 1 | Status: SHIPPED | OUTPATIENT
Start: 2019-03-04 | End: 2020-01-01 | Stop reason: SDUPTHER

## 2019-03-04 NOTE — TELEPHONE ENCOUNTER
----- Message from Jess Bill sent at 3/1/2019  4:26 PM CST -----  Contact: self - [phone]  Pt returned staff's call. In response to Dr. Mcintosh's question of any wounds on pt's legs; She states she does not have any wounds or sores on her legs only has a vein that bleeds sometimes but will see Vascular doctor on 03/14 for that.

## 2019-03-13 DIAGNOSIS — I10 HYPERTENSION, ESSENTIAL: ICD-10-CM

## 2019-03-13 DIAGNOSIS — E11.9 TYPE 2 DIABETES MELLITUS WITHOUT COMPLICATION, WITHOUT LONG-TERM CURRENT USE OF INSULIN: ICD-10-CM

## 2019-03-14 ENCOUNTER — HOSPITAL ENCOUNTER (OUTPATIENT)
Dept: CARDIOLOGY | Facility: HOSPITAL | Age: 69
Discharge: HOME | End: 2019-03-14
Attending: SURGERY
Payer: MEDICARE

## 2019-03-14 ENCOUNTER — OFFICE VISIT (OUTPATIENT)
Dept: VASCULAR SURGERY | Facility: CLINIC | Age: 69
End: 2019-03-14
Payer: MEDICARE

## 2019-03-14 VITALS — BODY MASS INDEX: 44.34 KG/M2 | HEIGHT: 63 IN | HEART RATE: 88 BPM | WEIGHT: 250.25 LBS

## 2019-03-14 DIAGNOSIS — E11.9 TYPE 2 DIABETES MELLITUS WITHOUT COMPLICATION, WITHOUT LONG-TERM CURRENT USE OF INSULIN: ICD-10-CM

## 2019-03-14 DIAGNOSIS — I10 HYPERTENSION, ESSENTIAL: ICD-10-CM

## 2019-03-14 DIAGNOSIS — Z13.6 ENCOUNTER FOR ABDOMINAL AORTIC ANEURYSM (AAA) SCREENING: Primary | ICD-10-CM

## 2019-03-14 DIAGNOSIS — I65.23 CAROTID STENOSIS, ASYMPTOMATIC, BILATERAL: ICD-10-CM

## 2019-03-14 PROCEDURE — 93880 EXTRACRANIAL BILAT STUDY: CPT | Mod: 26,,, | Performed by: SURGERY

## 2019-03-14 PROCEDURE — 99999 PR PBB SHADOW E&M-EST. PATIENT-LVL III: CPT | Mod: PBBFAC,,, | Performed by: SURGERY

## 2019-03-14 PROCEDURE — 99214 OFFICE O/P EST MOD 30 MIN: CPT | Mod: S$PBB,,, | Performed by: SURGERY

## 2019-03-14 PROCEDURE — 99213 OFFICE O/P EST LOW 20 MIN: CPT | Mod: PBBFAC,25 | Performed by: SURGERY

## 2019-03-14 PROCEDURE — 93880 EXTRACRANIAL BILAT STUDY: CPT

## 2019-03-14 PROCEDURE — 93880 CV US DOPPLER CAROTID (CUPID ONLY): ICD-10-PCS | Mod: 26,,, | Performed by: SURGERY

## 2019-03-14 PROCEDURE — 99999 PR PBB SHADOW E&M-EST. PATIENT-LVL III: ICD-10-PCS | Mod: PBBFAC,,, | Performed by: SURGERY

## 2019-03-14 PROCEDURE — 99214 PR OFFICE/OUTPT VISIT, EST, LEVL IV, 30-39 MIN: ICD-10-PCS | Mod: S$PBB,,, | Performed by: SURGERY

## 2019-03-14 RX ORDER — CLONIDINE HYDROCHLORIDE 0.1 MG/1
TABLET ORAL
Qty: 180 TABLET | Refills: 0 | Status: SHIPPED | OUTPATIENT
Start: 2019-03-14 | End: 2019-04-08

## 2019-03-14 RX ORDER — ATORVASTATIN CALCIUM 40 MG/1
40 TABLET, FILM COATED ORAL DAILY
Qty: 90 TABLET | Refills: 3 | Status: SHIPPED | OUTPATIENT
Start: 2019-03-14 | End: 2019-03-14 | Stop reason: SDUPTHER

## 2019-03-14 RX ORDER — LISINOPRIL 2.5 MG/1
TABLET ORAL
Qty: 90 TABLET | Refills: 0 | Status: SHIPPED | OUTPATIENT
Start: 2019-03-14 | End: 2019-04-08

## 2019-03-14 RX ORDER — METFORMIN HYDROCHLORIDE 500 MG/1
TABLET ORAL
Qty: 90 TABLET | Refills: 0 | Status: SHIPPED | OUTPATIENT
Start: 2019-03-14 | End: 2019-06-09 | Stop reason: SDUPTHER

## 2019-03-14 RX ORDER — HYDROCHLOROTHIAZIDE 25 MG/1
TABLET ORAL
Qty: 90 TABLET | Refills: 0 | Status: SHIPPED | OUTPATIENT
Start: 2019-03-14 | End: 2019-06-09 | Stop reason: SDUPTHER

## 2019-03-14 NOTE — PROGRESS NOTES
Leoncio Shaver MD [facility]                         03/14/2019    HPI:  Johanna Mancini is a 69 y.o. female with   Patient Active Problem List   Diagnosis    Varicose veins of both lower extremities with pain    COPD (chronic obstructive pulmonary disease)    Gout    Hypoxemia requiring supplemental oxygen    Hypertension, essential    Type 2 diabetes mellitus without complication, without long-term current use of insulin    Hemoglobin A1c less than 7.0%    Idiopathic chronic gout of multiple sites without tophus    Age-related osteoporosis without current pathological fracture    Umbilical hernia without obstruction and without gangrene    Mixed hyperlipidemia    Morbid obesity, unspecified obesity type    Stenosis of right carotid artery    being managed by PCP and specialists who is here today for evaluation of BLE varicose veins.  Patient states location is lower aspect of legs bilaterally occurring for many years.  Associated signs and symptoms include bleeding.  Has R posterior calf pain.  Quality is sharp and severity is 5/10.  Symptoms began several years ago.  Alleviating factors include elevation.  Worsening factors include dependency.    no MI  no Stroke  Tobacco use: quit 20 yrs ago    11/2018: Cont to experience BLE edema, not compliant with compression due to pain.  On home O2 not fully compliant.  PARIKH at 1/2 block.  No chest pain.  Minimal elevation.  No TIA, CVA or new neuro symptoms.    Interval history:  States breathing has worsened, having trouble breathing at night.  PARIKH at 1/2 - 1 block on average.  On home O2 at all times.  Just recovered from two colds.      Past Medical History:   Diagnosis Date    Articular gout     Carotid artery occlusion     CHF (congestive heart failure)     COPD (chronic obstructive pulmonary disease)     Diabetes mellitus, type 2     Hyperlipidemia     Hypertension     Osteopetrosis      Osteoporosis     Sleep apnea      Past Surgical History:   Procedure Laterality Date    CHOLECYSTECTOMY      HYSTERECTOMY      MINERVA equivalent     Family History   Problem Relation Age of Onset    Heart disease Mother     Cancer Father     Cancer Sister         breast     Social History     Socioeconomic History    Marital status:      Spouse name: Not on file    Number of children: Not on file    Years of education: Not on file    Highest education level: Not on file   Social Needs    Financial resource strain: Not on file    Food insecurity - worry: Not on file    Food insecurity - inability: Not on file    Transportation needs - medical: Not on file    Transportation needs - non-medical: Not on file   Occupational History    Not on file   Tobacco Use    Smoking status: Former Smoker    Smokeless tobacco: Never Used   Substance and Sexual Activity    Alcohol use: No    Drug use: No    Sexual activity: Not on file   Other Topics Concern    Not on file   Social History Narrative    .  Three children. Former smoker, quit 15 yr ago.  Retired , cook and kitchen work.        Current Outpatient Medications:     albuterol (PROVENTIL) 2.5 mg /3 mL (0.083 %) nebulizer solution, Take 3 mLs (2.5 mg total) by nebulization every 6 (six) hours as needed for Wheezing., Disp: 300 mL, Rfl: 1    allopurinol (ZYLOPRIM) 100 MG tablet, TAKE 1 TABLET(100 MG) BY MOUTH EVERY DAY, Disp: 8100 tablet, Rfl: 1    aspirin (ECOTRIN) 81 MG EC tablet, Take 1 tablet (81 mg total) by mouth once daily., Disp: 90 tablet, Rfl: 3    atorvastatin (LIPITOR) 40 MG tablet, Take 1 tablet (40 mg total) by mouth once daily., Disp: 90 tablet, Rfl: 3    blood sugar diagnostic Strp, TEST BLOOD SUGARS  TWICE DAILY FOR TRUMETRIX GLUCOMETER, Disp: 200 strip, Rfl: 0    blood-glucose meter Misc, TEST BLOOD SUGAR AS DIRECTED WITH TRUE METRIX MACHINE, Disp: 1 each, Rfl: 0    budesonide-formoterol 160-4.5 mcg (SYMBICORT)  160-4.5 mcg/actuation HFAA, Inhale into the lungs., Disp: , Rfl:     CARTIA  mg Cp24, TAKE ONE CAPSULE BY MOUTH ONCE DAILY, Disp: 90 capsule, Rfl: 3    cloNIDine (CATAPRES) 0.1 MG tablet, TAKE 1 TABLET(0.1 MG) BY MOUTH TWICE DAILY, Disp: 180 tablet, Rfl: 0    diltiaZEM (CARDIZEM CD) 120 MG Cp24, Take 1 capsule (120 mg total) by mouth once daily., Disp: 90 capsule, Rfl: 1    hydroCHLOROthiazide (HYDRODIURIL) 25 MG tablet, TAKE 1 TABLET(25 MG) BY MOUTH EVERY DAY, Disp: 90 tablet, Rfl: 0    ibuprofen (ADVIL,MOTRIN) 200 MG tablet, Take 1 tablet (200 mg total) by mouth every 6 (six) hours as needed., Disp: , Rfl:     ipratropium (ATROVENT) 0.02 % nebulizer solution, Inhale into the lungs., Disp: , Rfl:     lancets (ONETOUCH ULTRASOFT LANCETS) Misc, USE WITH TRUE METRIX METER AND TESTS STRIPS twice daily, Disp: 200 each, Rfl: 0    lisinopril (PRINIVIL,ZESTRIL) 2.5 MG tablet, TAKE 1 TABLET(2.5 MG) BY MOUTH EVERY DAY, Disp: 90 tablet, Rfl: 0    metFORMIN (GLUCOPHAGE) 500 MG tablet, TAKE 1 TABLET(500 MG) BY MOUTH DAILY WITH BREAKFAST, Disp: 90 tablet, Rfl: 0    theophylline (THEODUR) 300 MG 12 hr tablet, Take 1 tablet (300 mg total) by mouth 2 (two) times daily., Disp: 180 tablet, Rfl: 1    REVIEW OF SYSTEMS:  General: No fevers or chills; ENT: No sore throat; Allergy and Immunology: no persistent infections; Hematological and Lymphatic: No history of bleeding or easy bruising; Endocrine: negative; Respiratory: no cough, shortness of breath, or wheezing; Cardiovascular: no chest pain or dyspnea on exertion; Gastrointestinal: no abdominal pain/back, change in bowel habits, or bloody stools; Genito-Urinary: no dysuria, trouble voiding, or hematuria; Musculoskeletal: negative; Neurological: no TIA or stroke symptoms; Psychiatric: no nervousness, anxiety or depression.    PHYSICAL EXAM:   Right Arm BP - Sittin/64 (19 1100)  Left Arm BP - Sittin/70 (19 1100)  Pulse: 88         General  appearance:  Alert, well-appearing, and in no distress.  Oriented to person, place, and time                    Neurological: Normal speech, no focal findings noted; CN II - XII grossly intact. RLE with sensation to light touch, LLE with sensation to light touch.            Musculoskeletal: Digits/nail without cyanosis/clubbing.  Strength 5/5 BLE.                    Neck: Supple, no significant adenopathy, bilateral carotid bruit can be auscultated                  Chest:  Clear to auscultation, no wheezes, rales or rhonchi, symmetric air entry. No use of accessory muscles               Cardiac: Normal rate and regular rhythm, S1 and S2 normal            Abdomen: Soft, nontender, nondistended, no masses or organomegaly, no hernia     No rebound tenderness noted; bowel sounds normal     No groin adenopathy      Extremities:   2+ R femoral pulse, 2+ L femoral pulse     1+ R popliteal pulse, 1+ L popliteal pulse     1+ R PT pulse, 1+ L PT pulse     1+ R DP pulse, 1+ L DP pulse     2+ RLE edema, 2+ LLE edema    Skin: BLE with varicose veins and spider veins    LAB RESULTS:  No results found for: CBC  Lab Results   Component Value Date    LABPROT 10.7 12/17/2011    INR 1.0 12/17/2011     Lab Results   Component Value Date     01/25/2018    K 3.8 01/25/2018    CL 97 01/25/2018    CO2 30 (H) 01/25/2018     01/25/2018    BUN 18 01/25/2018    CREATININE 0.8 01/25/2018    CALCIUM 9.9 01/25/2018    ANIONGAP 14 01/25/2018    EGFRNONAA >60 01/25/2018     Lab Results   Component Value Date    WBC 5.85 01/25/2018    RBC 4.86 01/25/2018    HGB 13.7 01/25/2018    HCT 43.3 01/25/2018    MCV 89 01/25/2018    MCH 28.2 01/25/2018    MCHC 31.6 (L) 01/25/2018    RDW 13.7 01/25/2018     01/25/2018    MPV 12.2 01/25/2018    GRAN 44.0 01/25/2018    LYMPH 40.0 01/25/2018    MONO 7.0 01/25/2018    EOS 0.2 06/27/2014    BASO 0.01 06/27/2014    EOSINOPHIL 3.0 01/25/2018    BASOPHIL 0.0 01/25/2018    DIFFMETHOD Automated  01/25/2018     .  Lab Results   Component Value Date    HGBA1C 6.1 (H) 05/15/2018       IMAGING:  All pertinent imaging has been reviewed and interpreted independently.    Bilateral GSV reflux.  No DVT.    Carotid US 9/27/18: R 80-99% stenosis, L 60-79% stenosis    Carotid US 3/14/19: R 80-99% stenosis, L 60-79% stenosis    IMP/PLAN:  69 y.o. female with   Patient Active Problem List   Diagnosis    Varicose veins of both lower extremities with pain    COPD (chronic obstructive pulmonary disease)    Gout    Hypoxemia requiring supplemental oxygen    Hypertension, essential    Type 2 diabetes mellitus without complication, without long-term current use of insulin    Hemoglobin A1c less than 7.0%    Idiopathic chronic gout of multiple sites without tophus    Age-related osteoporosis without current pathological fracture    Umbilical hernia without obstruction and without gangrene    Mixed hyperlipidemia    Morbid obesity, unspecified obesity type    Stenosis of right carotid artery    being managed by PCP and specialists who is here today for evaluation of BLE edema and varicose veins.    -Asymptomatic carotid occlusive disease, bilateral R/L, high pulmonary risk due to severe obstruction due to  - due to pt inability to tolerate lying flat due to orthopnea and COPD for R CEA under regional anesthesia vs carotid stent, risks outweigh benefits of surgical intervention at this time; cont optimization of pulmonary status and will treat with best medical therapy   -We discussed the stroke risk of an asymptomatic carotid stenosis (11% at 5 years) compared to the incidence following CEA (5-6% at 5 years) as well as the kelsea-procedural risk of stroke (2% vs 4%, CEA vs TONEY) and MI (2% vs 1% CEA vs TONEY), with high risk of pulmonary failure in her case  -Cont ASA, Atorvastatin to 80 mg daily, and BP control  -BLE edema improved - rec cont compression with Rx stockings, elevation, dietary changes associated with  water and sodium intake discussed at length with patient    I spent 20 minutes evaluating this patient and greater than 50% of the time was spent counseling, coordinator care and discussing the plan of care.  All questions were answered and patient stated understanding with agreement with the above treatment plan.    Leoncio Shaver MD [facility]  Vascular and Endovascular Surgery

## 2019-03-14 NOTE — TELEPHONE ENCOUNTER
----- Message from Flavio Velazquez sent at 3/14/2019 11:56 AM CDT -----  Contact: Self/[phone]  Refill: atorvastatin (LIPITOR) 80 MG tablet         [facility] [address] [place] [address] EXPY AT [facility] [phone] (Phone)  [phone] (Fax)      Thank you

## 2019-03-14 NOTE — PATIENT INSTRUCTIONS
Carotid Artery Problems: Stroke  The carotid arteries are large blood vessels that carry blood to the brain. When these arteries are healthy, the brain gets all the oxygen and nutrients it needs to function well. If the carotid arteries are damaged, however, it can greatly increase your chances of stroke. This is a sudden loss of brain function caused by a lack of blood flow and oxygen.     Small pieces of a blood clot called emboli break off and can enter the bloodstream and travel to the brain. Brain tissue is damaged when emboli block arteries in the brain.   How artery damage can lead to stroke  In a healthy carotid artery, the inside of the artery wall is smooth and open. But health problems such as high blood pressure can damage the artery wall and make it rough. This allows fatty deposits called plaque to build up in the artery wall. Blood clots called emboli may also form on the plaque. If pieces of plaque or emboli break off, they can flow in the blood until they get stuck in a small blood vessel in the brain. This blocks blood flow to a part of the brain causing a stroke.  Symptoms of stroke  Below are common symptoms of stroke. Call 911 right away if you have any of these symptoms. Prompt medical treatment for a stroke is vital. The longer you wait to get help, the more damage a stroke can do.  · Sudden numbness or weakness of the face, arm, or leg, especially on 1 side of the body  · Sudden confusion or trouble speaking or understanding  · Sudden trouble seeing in one or both eyes  · Sudden trouble walking, dizziness, or loss of balance or coordination  · Sudden, severe headache with no known cause  Transient ischemic attack (TIA)  A transient ischemic attack or TIAis a ministroke. Its a serious warning sign of a larger stroke. TIAs happen when an artery to the brain is temporarily blocked. This blockage will cause symptoms identical to those that happen with stroke. The only difference is that they  last a short period of time, from a few seconds to a few hours. Never ignore any stroke symptoms. Call 911 right away.  F.A.S.T. is an easy way to remember the signs of stroke. When you see these signs, you will know that you need to call 911 fast.  F.A.S.T. stands for:  · F is for face drooping - One side of the face is drooping or numb. When the person smiles, the smile is uneven.  · A is for arm weakness - One arm is weak or numb. When the person lifts both arms at the same time, one arm may drift downward.  · S is for speech difficulty - You may notice slurred speech or difficulty speaking. The person can't repeat a simple sentence correctly when asked.  · T is for time to call 911 - If someone shows any of these symptoms, even if they go away, call 911 immediately. Make note of the time the symptoms first appeared.  Date Last Reviewed: 8/26/2015 © 2000-2017 [employer]. [address] [place] [address]. All rights reserved. This information is not intended as a substitute for professional medical care. Always follow your healthcare professional's instructions.        Carotid Artery Problems: Blockage     A healthy carotid artery lets blood flow easily to the brain.   The blood carries oxygen and nutrients throughout the body. The carotid arteries are large blood vessels that carry blood to the brain. Certain health problems can make the inside of the carotid arteries narrow and rough. Over time, this damage increases the chances of having a stroke. A stroke is a sudden loss of brain function.   Open carotid arteries  In a healthy carotid artery, the inside of the artery is open. The lining of the artery wall is also smooth. This lets blood flow freely from the heart to the brain. The brain gets all the oxygen and nutrients it needs to function well.  Narrowed carotid arteries  Health factors, such as high blood pressure, high cholesterol, smoking, and diabetes, can damage artery walls  and make them rough. This allows cholesterol and other particles in the blood to stick to the artery walls and form plaque or fatty deposits. As the plaque builds up, it can narrow the artery. Blood may also collect on the plaque and form blood clots.  How a stroke happens     Emboli can enter the bloodstream and travel to the brain. Brain tissue is damaged when emboli block arteries in the brain.   A stroke can happen when plaque in the carotid artery ruptures. This can allow small pieces of plaque to break off into the bloodstream. At the same time, rupture can make more blood clots. The pieces of plaque and tiny blood clots or emboli can flow in the blood until they get stuck in a small blood vessel in the brain. This blocks blood flow to part of the brain and causes a stroke.  Date Last Reviewed: 6/8/2015  © 1573-3543 [employer]. [address] [place] [address], [place] [address]. All rights reserved. This information is not intended as a substitute for professional medical care. Always follow your healthcare professional's instructions.

## 2019-03-14 NOTE — LETTER
March 14, 2019        Johnson Mcintosh MD  [address] [facility] [address]             [facility]  [address] [facility] [address]  [facility] [address]  Phone: [phone]  Fax: [phone]   Patient: Johanna Mancini   MR Number: [id number]   YOB: 1950   Date of Visit: 3/14/2019       Dear Dr. Mcintosh:    Thank you for referring Johanna Mancini to me for evaluation. Below are the relevant portions of my assessment and plan of care.            If you have questions, please do not hesitate to call me. I look forward to following Johanna along with you.    Sincerely,      Leoncio Shaver MD           CC  No Recipients

## 2019-03-15 LAB
LEFT CBA DIAS: 69 CM/S
LEFT CBA SYS: 266 CM/S
LEFT CCA DIST DIAS: 30 CM/S
LEFT CCA DIST SYS: 95 CM/S
LEFT CCA MID DIAS: 24 CM/S
LEFT CCA MID SYS: 113 CM/S
LEFT CCA PROX DIAS: 24 CM/S
LEFT CCA PROX SYS: 87 CM/S
LEFT ECA DIAS: 31 CM/S
LEFT ECA SYS: 247 CM/S
LEFT ICA DIST DIAS: 22 CM/S
LEFT ICA DIST SYS: 72 CM/S
LEFT ICA MID DIAS: 31 CM/S
LEFT ICA MID SYS: 82 CM/S
LEFT ICA PROX DIAS: 92 CM/S
LEFT ICA PROX SYS: 279 CM/S
LEFT VERTEBRAL DIAS: 21 CM/S
LEFT VERTEBRAL SYS: 58 CM/S
OHS CV CAROTID RIGHT ICA EDV HIGHEST: 30
OHS CV CAROTID ULTRASOUND LEFT ICA/CCA RATIO: 2.47
OHS CV CAROTID ULTRASOUND RIGHT ICA/CCA RATIO: 2.11
OHS CV PV CAROTID LEFT HIGHEST CCA: 113
OHS CV PV CAROTID LEFT HIGHEST ICA: 279
OHS CV PV CAROTID RIGHT HIGHEST CCA: 65
OHS CV PV CAROTID RIGHT HIGHEST ICA: 137
OHS CV US CAROTID LEFT HIGHEST EDV: 92
RIGHT CBA DIAS: 198 CM/S
RIGHT CBA SYS: 503 CM/S
RIGHT CCA DIST DIAS: 11 CM/S
RIGHT CCA DIST SYS: 59 CM/S
RIGHT CCA MID DIAS: 10 CM/S
RIGHT CCA MID SYS: 59 CM/S
RIGHT CCA PROX DIAS: 10 CM/S
RIGHT CCA PROX SYS: 65 CM/S
RIGHT ECA DIAS: 15 CM/S
RIGHT ECA SYS: 159 CM/S
RIGHT ICA DIST DIAS: 28 CM/S
RIGHT ICA DIST SYS: 56 CM/S
RIGHT ICA MID DIAS: 23 CM/S
RIGHT ICA MID SYS: 63 CM/S
RIGHT ICA PROX DIAS: 30 CM/S
RIGHT ICA PROX SYS: 137 CM/S
RIGHT VERTEBRAL DIAS: 46 CM/S
RIGHT VERTEBRAL SYS: 120 CM/S

## 2019-03-15 RX ORDER — ATORVASTATIN CALCIUM 40 MG/1
TABLET, FILM COATED ORAL
Qty: 90 TABLET | Refills: 0 | Status: SHIPPED | OUTPATIENT
Start: 2019-03-15 | End: 2019-03-15

## 2019-03-15 RX ORDER — ATORVASTATIN CALCIUM 80 MG/1
80 TABLET, FILM COATED ORAL DAILY
Qty: 90 TABLET | Refills: 3 | Status: SHIPPED | OUTPATIENT
Start: 2019-03-15 | End: 2019-01-01 | Stop reason: SDUPTHER

## 2019-03-15 RX ORDER — ATORVASTATIN CALCIUM 40 MG/1
80 TABLET, FILM COATED ORAL DAILY
Qty: 90 TABLET | Refills: 0 | Status: CANCELLED | OUTPATIENT
Start: 2019-03-15

## 2019-03-15 NOTE — TELEPHONE ENCOUNTER
----- Message from Anushka Peraza sent at 3/14/2019  3:14 PM CDT -----  Contact: Self/  [phone]  Patient would like staff too give her a call regarding her medication.   Thank you

## 2019-03-18 LAB
LEFT EYE DM RETINOPATHY: NEGATIVE
RIGHT EYE DM RETINOPATHY: NEGATIVE

## 2019-03-18 NOTE — TELEPHONE ENCOUNTER
----- Message from Eduarda Garcia RN sent at 3/14/2019  4:07 PM CDT -----  Regarding: atorvastatin  I know this patient has contacted you but just wanted to clarify that Dr. Shaver would like this patient to take 80 mg of atorvastatin but she is only ordered 40mg once per day. I had messaged the wrong office earlier. Thanks.  Eduarda

## 2019-03-25 ENCOUNTER — HOSPITAL ENCOUNTER (OUTPATIENT)
Dept: CARDIOLOGY | Facility: HOSPITAL | Age: 69
Discharge: HOME | End: 2019-03-25
Attending: SURGERY
Payer: MEDICARE

## 2019-03-25 DIAGNOSIS — Z13.6 ENCOUNTER FOR ABDOMINAL AORTIC ANEURYSM (AAA) SCREENING: ICD-10-CM

## 2019-03-25 LAB
ABDOMINAL IMA AP: 2.65 CM
ABDOMINAL IMA ED VEL: 12 CM/S
ABDOMINAL IMA PS VEL: 70 CM/S
ABDOMINAL IMA TRANS: 2.45 CM
ABDOMINAL INFRARENAL AORTA AP: 2.62 CM
ABDOMINAL INFRARENAL AORTA ED VEL: 15 CM/S
ABDOMINAL INFRARENAL AORTA PS VEL: 104 CM/S
ABDOMINAL INFRARENAL AORTA TRANS: 2.59 CM
ABDOMINAL JUXTARENAL AORTA AP: 2.18 CM
ABDOMINAL JUXTARENAL AORTA ED VEL: 16 CM/S
ABDOMINAL JUXTARENAL AORTA PS VEL: 73 CM/S
ABDOMINAL JUXTARENAL AORTA TRANS: 2.03 CM
ABDOMINAL LT COM ILIAC AP: 1.02 CM
ABDOMINAL LT COM ILIAC TRANS: 0.97 CM
ABDOMINAL LT COM ILIAC VEL: 120 CM/S
ABDOMINAL LT COM ILLIAC ED VEL: 13 CM/S
ABDOMINAL RT COM ILIAC AP: 1.13 CM
ABDOMINAL RT COM ILIAC TRANS: 1.06 CM
ABDOMINAL RT COM ILIAC VEL: 122 CM/S
ABDOMINAL RT COM ILLIAC ED VEL: 22 CM/S
ABDOMINAL SUPRARENAL AORTA AP: 2.24 CM
ABDOMINAL SUPRARENAL AORTA ED VEL: 16 CM/S
ABDOMINAL SUPRARENAL AORTA PS VEL: 80 CM/S
ABDOMINAL SUPRARENAL AORTA TRANS: 2.35 CM

## 2019-03-25 PROCEDURE — 76706 US ABDL AORTA SCREEN AAA: CPT

## 2019-03-25 PROCEDURE — 76706 CV US AAA SCREENING (CUPID ONLY): ICD-10-PCS | Mod: 26,,, | Performed by: SURGERY

## 2019-03-25 PROCEDURE — 76706 US ABDL AORTA SCREEN AAA: CPT | Mod: 26,,, | Performed by: SURGERY

## 2019-03-27 ENCOUNTER — TELEPHONE (OUTPATIENT)
Dept: VASCULAR SURGERY | Facility: CLINIC | Age: 69
End: 2019-03-27

## 2019-04-08 ENCOUNTER — OFFICE VISIT (OUTPATIENT)
Dept: CARDIOLOGY | Facility: CLINIC | Age: 69
End: 2019-04-08
Payer: MEDICARE

## 2019-04-08 VITALS
WEIGHT: 260.13 LBS | SYSTOLIC BLOOD PRESSURE: 118 MMHG | OXYGEN SATURATION: 92 % | RESPIRATION RATE: 15 BRPM | HEIGHT: 63 IN | DIASTOLIC BLOOD PRESSURE: 72 MMHG | BODY MASS INDEX: 46.09 KG/M2 | HEART RATE: 90 BPM

## 2019-04-08 DIAGNOSIS — I65.23 CAROTID STENOSIS, ASYMPTOMATIC, BILATERAL: ICD-10-CM

## 2019-04-08 DIAGNOSIS — I83.813 VARICOSE VEINS OF BOTH LOWER EXTREMITIES WITH PAIN: ICD-10-CM

## 2019-04-08 DIAGNOSIS — I65.21 STENOSIS OF RIGHT CAROTID ARTERY: ICD-10-CM

## 2019-04-08 DIAGNOSIS — I10 HYPERTENSION, ESSENTIAL: Primary | ICD-10-CM

## 2019-04-08 DIAGNOSIS — E66.01 MORBID OBESITY, UNSPECIFIED OBESITY TYPE: ICD-10-CM

## 2019-04-08 DIAGNOSIS — E78.2 MIXED HYPERLIPIDEMIA: ICD-10-CM

## 2019-04-08 DIAGNOSIS — E11.9 TYPE 2 DIABETES MELLITUS WITHOUT COMPLICATION, WITHOUT LONG-TERM CURRENT USE OF INSULIN: ICD-10-CM

## 2019-04-08 PROCEDURE — 93010 ELECTROCARDIOGRAM REPORT: CPT | Mod: S$PBB,,, | Performed by: INTERNAL MEDICINE

## 2019-04-08 PROCEDURE — 99214 OFFICE O/P EST MOD 30 MIN: CPT | Mod: S$PBB,,, | Performed by: INTERNAL MEDICINE

## 2019-04-08 PROCEDURE — 99213 OFFICE O/P EST LOW 20 MIN: CPT | Mod: PBBFAC | Performed by: INTERNAL MEDICINE

## 2019-04-08 PROCEDURE — 93010 EKG 12-LEAD: ICD-10-PCS | Mod: S$PBB,,, | Performed by: INTERNAL MEDICINE

## 2019-04-08 PROCEDURE — 99999 PR PBB SHADOW E&M-EST. PATIENT-LVL III: ICD-10-PCS | Mod: PBBFAC,,, | Performed by: INTERNAL MEDICINE

## 2019-04-08 PROCEDURE — 99999 PR PBB SHADOW E&M-EST. PATIENT-LVL III: CPT | Mod: PBBFAC,,, | Performed by: INTERNAL MEDICINE

## 2019-04-08 PROCEDURE — 99214 PR OFFICE/OUTPT VISIT, EST, LEVL IV, 30-39 MIN: ICD-10-PCS | Mod: S$PBB,,, | Performed by: INTERNAL MEDICINE

## 2019-04-08 PROCEDURE — 93005 ELECTROCARDIOGRAM TRACING: CPT | Mod: PBBFAC | Performed by: INTERNAL MEDICINE

## 2019-04-08 RX ORDER — DILTIAZEM HYDROCHLORIDE 240 MG/1
240 CAPSULE, COATED, EXTENDED RELEASE ORAL DAILY
Qty: 90 CAPSULE | Refills: 3 | Status: SHIPPED | OUTPATIENT
Start: 2019-04-08 | End: 2019-01-01 | Stop reason: SDUPTHER

## 2019-04-08 NOTE — PROGRESS NOTES
CARDIOVASCULAR PROGRESS NOTE    REASON FOR CONSULT:   Johanna Mancini is a 69 y.o. female who presents for f/u htn, hlp.    Vasc: Sivakumar  PCP: Page  HISTORY OF PRESENT ILLNESS:   The patient returns for follow-up.  In the interim since her last office visit, she has been seen by Dr. Shaver with plans for medical therapy of her carotid disease.  She continues to complain of stable dyspnea and is on home O2 for her COPD.  She has had no anginal symptoms.  She denies any palpitations, lightheadedness, dizziness, or syncope.  There has been no PND, orthopnea, melena, hematuria, or claudicant symptoms.    CARDIOVASCULAR HISTORY:   Carotid stenosis R>L, followed by Dr. Shaver  [facility]    PAST MEDICAL HISTORY:     Past Medical History:   Diagnosis Date    Articular gout     Carotid artery occlusion     CHF (congestive heart failure)     COPD (chronic obstructive pulmonary disease)     Diabetes mellitus, type 2     Hyperlipidemia     Hypertension     Osteopetrosis     Osteoporosis     Sleep apnea        PAST SURGICAL HISTORY:     Past Surgical History:   Procedure Laterality Date    CHOLECYSTECTOMY      HYSTERECTOMY      MINERVA equivalent       ALLERGIES AND MEDICATION:     Review of patient's allergies indicates:   Allergen Reactions    Tylenol [acetaminophen] Anaphylaxis    Levofloxacin Itching        Medication List           Accurate as of 4/8/19  1:41 PM. If you have any questions, ask your nurse or doctor.               CONTINUE taking these medications    albuterol 2.5 mg /3 mL (0.083 %) nebulizer solution  Commonly known as:  PROVENTIL  Take 3 mLs (2.5 mg total) by nebulization every 6 (six) hours as needed for Wheezing.     allopurinol 100 MG tablet  Commonly known as:  ZYLOPRIM  TAKE 1 TABLET(100 MG) BY MOUTH EVERY DAY     aspirin 81 MG EC tablet  Commonly known as:  ECOTRIN  Take 1 tablet (81 mg total) by mouth once daily.     atorvastatin 80 MG tablet  Commonly known as:  LIPITOR  Take 1 tablet (80  mg total) by mouth once daily.     blood sugar diagnostic Strp  TEST BLOOD SUGARS  TWICE DAILY FOR TRUMETRIX GLUCOMETER     blood-glucose meter Misc  TEST BLOOD SUGAR AS DIRECTED WITH TRUE METRIX MACHINE     cloNIDine 0.1 MG tablet  Commonly known as:  CATAPRES  TAKE 1 TABLET(0.1 MG) BY MOUTH TWICE DAILY     * diltiaZEM 120 MG Cp24  Commonly known as:  CARDIZEM CD  Take 1 capsule (120 mg total) by mouth once daily.     * CARTIA  MG Cp24  Generic drug:  diltiaZEM  TAKE ONE CAPSULE BY MOUTH ONCE DAILY     hydroCHLOROthiazide 25 MG tablet  Commonly known as:  HYDRODIURIL  TAKE 1 TABLET(25 MG) BY MOUTH EVERY DAY     ibuprofen 200 MG tablet  Commonly known as:  ADVIL,MOTRIN  Take 1 tablet (200 mg total) by mouth every 6 (six) hours as needed.     ipratropium 0.02 % nebulizer solution  Commonly known as:  ATROVENT     lancets Misc  Commonly known as:  ONETOUCH ULTRASOFT LANCETS  USE WITH TRUE METRIX METER AND TESTS STRIPS twice daily     lisinopril 2.5 MG tablet  Commonly known as:  PRINIVIL,ZESTRIL  TAKE 1 TABLET(2.5 MG) BY MOUTH EVERY DAY     metFORMIN 500 MG tablet  Commonly known as:  GLUCOPHAGE  TAKE 1 TABLET(500 MG) BY MOUTH DAILY WITH BREAKFAST     SYMBICORT 160-4.5 mcg/actuation Hfaa  Generic drug:  budesonide-formoterol 160-4.5 mcg     theophylline 300 MG 12 hr tablet  Commonly known as:  THEODUR  Take 1 tablet (300 mg total) by mouth 2 (two) times daily.         * This list has 2 medication(s) that are the same as other medications prescribed for you. Read the directions carefully, and ask your doctor or other care provider to review them with you.                SOCIAL HISTORY:     Social History     Socioeconomic History    Marital status:      Spouse name: Not on file    Number of children: Not on file    Years of education: Not on file    Highest education level: Not on file   Occupational History    Not on file   Social Needs    Financial resource strain: Not on file    Food insecurity:      Worry: Not on file     Inability: Not on file    Transportation needs:     Medical: Not on file     Non-medical: Not on file   Tobacco Use    Smoking status: Former Smoker    Smokeless tobacco: Never Used   Substance and Sexual Activity    Alcohol use: No    Drug use: No    Sexual activity: Not on file   Lifestyle    Physical activity:     Days per week: Not on file     Minutes per session: Not on file    Stress: Not on file   Relationships    Social connections:     Talks on phone: Not on file     Gets together: Not on file     Attends Jew service: Not on file     Active member of club or organization: Not on file     Attends meetings of clubs or organizations: Not on file     Relationship status: Not on file   Other Topics Concern    Not on file   Social History Narrative    .  Three children. Former smoker, quit 15 yr ago.  Retired , cook and kitchen work.        FAMILY HISTORY:     Family History   Problem Relation Age of Onset    Heart disease Mother     Cancer Father     Cancer Sister         breast       REVIEW OF SYSTEMS:   Review of Systems   Constitutional: Negative for chills, diaphoresis and fever.   HENT: Negative for nosebleeds.    Eyes: Negative for blurred vision, double vision and photophobia.   Respiratory: Positive for shortness of breath. Negative for hemoptysis and wheezing.    Cardiovascular: Negative for chest pain, palpitations, orthopnea, claudication, leg swelling and PND.   Gastrointestinal: Negative for abdominal pain, blood in stool, heartburn, melena, nausea and vomiting.   Genitourinary: Negative for flank pain and hematuria.   Musculoskeletal: Negative for falls, myalgias and neck pain.   Skin: Negative for rash.   Neurological: Negative for dizziness, seizures, loss of consciousness, weakness and headaches.   Endo/Heme/Allergies: Negative for polydipsia. Does not bruise/bleed easily.   Psychiatric/Behavioral: Negative for depression and memory  "loss. The patient is not nervous/anxious.        PHYSICAL EXAM:     Vitals:    04/08/19 1318   BP: 118/72   Pulse: 90   Resp: 15    Body mass index is 46.08 kg/m².  Weight: 118 kg (260 lb 2.3 oz)   Height: 5' 3" (160 cm)     Physical Exam   Constitutional: She is oriented to person, place, and time. She appears well-developed and well-nourished. She is cooperative.  Non-toxic appearance. No distress.   HENT:   Head: Normocephalic and atraumatic.   Eyes: Pupils are equal, round, and reactive to light. Conjunctivae and EOM are normal. No scleral icterus.   Neck: Trachea normal and normal range of motion. Neck supple. Normal carotid pulses and no JVD present. Carotid bruit is not present. No neck rigidity. No tracheal deviation and no edema present. No thyromegaly present.   Cardiovascular: Normal rate, regular rhythm, S1 normal and S2 normal. PMI is not displaced. Exam reveals no gallop and no friction rub.   No murmur heard.  Pulses:       Carotid pulses are 2+ on the right side, and 2+ on the left side.  Pulmonary/Chest: Effort normal and breath sounds normal. No stridor. No respiratory distress. She has no wheezes. She has no rales. She exhibits no tenderness.   Abdominal: Soft. She exhibits no distension. There is no hepatosplenomegaly.   obese   Musculoskeletal: Normal range of motion. She exhibits no edema or tenderness.   Feet:   Right Foot:   Skin Integrity: Negative for ulcer.   Left Foot:   Skin Integrity: Negative for ulcer.   Neurological: She is alert and oriented to person, place, and time. No cranial nerve deficit.   Skin: Skin is warm and dry. No rash noted. No erythema.   Psychiatric: She has a normal mood and affect. Her speech is normal and behavior is normal.   Vitals reviewed.      DATA:   EKG: (personally reviewed tracing)  4/8/19 SR 90, low volt, NSSTTW changes, similar to 10/15/18    Laboratory:  CBC:  Recent Labs   Lab 01/25/18  1810   WHITE BLOOD CELL COUNT 5.85   HEMOGLOBIN 13.7   HEMATOCRIT " 43.3   PLATELETS 174       CHEMISTRIES:  Recent Labs   Lab 10/16/17  1125 01/25/18  1810   7452 GLUCOSE 111 H  --    GLUCOSE  --  103   7448 SODIUM 142  --    SODIUM  --  141   POTASSIUM 4.3 3.8   BUN BLD 15 18   CREATININE  --  0.8   7451 CREATININE 0.65  --    EGFR IF   --  >60   EGFR IF NON-  --  >60   CALCIUM 9.3 9.9       CARDIAC BIOMARKERS:  Recent Labs   Lab 01/25/18  1810   TROPONIN I <0.006       COAGS:        LIPIDS/LFTS:  Recent Labs   Lab 10/16/17  1125 01/25/18  1810   AST 19 19   ALT  --  18       Cardiovascular Testing:  Aortic US 3/25/19  Abdominal aortic ultrasound shows maximal aortic diameter of 2.65 x 2.45 at the infrarenal level.  There is no iliac artery aneurysm.      Carotid US 3/14/19 (similar to report 9/27/18)  1. Right carotid ultrasound shows 80-99% internal carotid artery stenosis.  Antegrade vertebral artery flow.  2. Left carotid ultrasound shows  60-79% internal carotid artery stenosis.  Antegrade vertebral artery flow.    Tracy MPI 10/15/18   · The perfusion scan is free of evidence from myocardial ischemia or injury.  · Post Stress Ejection Fraction is 73 %    Echo 10/15/18  · Left ventricle ejection fraction is normal at 60%  · Left ventricle shows concentric remodeling.  · Normal LV diastolic function.  · RV systolic function is normal.  · Mild aortic valve stenosis.  · KOLBY is 2.72 cm2; peak velocity is 1.62 m/s; mean gradient is 6.36 mmHg.    LE venous US/reflux 6/20/18  1. There is hemodynamically significant venous reflux (reflux lasting greater than 500 ms) within the bilateral greater saphenous vein.  2. There is no evidence of bilateral lower extremity deep venous thrombosis.    ASSESSMENT:   # preop CV eval prior to R CEA.  Echo and MPI 10/2018 normal.  # reported CP with exertion.  Echo and MPI 10/2018 normal.  # HTN, controlled  # O2 dep COPD, pulm eval pending (at [facility])  # HLP, on atorva 40mg  # DM  # BMI 46, down 1 unit vs last OV  #  [facility]    PLAN:   Cont med rx  Stop lisinopril  Wean clonidine to off over 1 week  Inc dilt to 240mg qd  Check lipids/LFTs  RTC 1 month    Desean Monroe MD, FACC

## 2019-04-12 DIAGNOSIS — E11.9 TYPE 2 DIABETES MELLITUS WITHOUT COMPLICATION: ICD-10-CM

## 2019-04-22 ENCOUNTER — LAB VISIT (OUTPATIENT)
Dept: LAB | Facility: HOSPITAL | Age: 69
End: 2019-04-22
Attending: INTERNAL MEDICINE
Payer: MEDICARE

## 2019-04-22 DIAGNOSIS — E78.2 MIXED HYPERLIPIDEMIA: ICD-10-CM

## 2019-04-22 LAB
ALBUMIN SERPL BCP-MCNC: 3.5 G/DL (ref 3.5–5.2)
ALP SERPL-CCNC: 108 U/L (ref 55–135)
ALT SERPL W/O P-5'-P-CCNC: 13 U/L (ref 10–44)
AST SERPL-CCNC: 15 U/L (ref 10–40)
BILIRUB DIRECT SERPL-MCNC: 0.2 MG/DL (ref 0.1–0.3)
BILIRUB SERPL-MCNC: 0.4 MG/DL (ref 0.1–1)
CHOLEST SERPL-MCNC: 130 MG/DL (ref 120–199)
CHOLEST/HDLC SERPL: 2.5 {RATIO} (ref 2–5)
HDLC SERPL-MCNC: 52 MG/DL (ref 40–75)
HDLC SERPL: 40 % (ref 20–50)
LDLC SERPL CALC-MCNC: 56.2 MG/DL (ref 63–159)
NONHDLC SERPL-MCNC: 78 MG/DL
PROT SERPL-MCNC: 7.4 G/DL (ref 6–8.4)
TRIGL SERPL-MCNC: 109 MG/DL (ref 30–150)

## 2019-04-22 PROCEDURE — 80061 LIPID PANEL: CPT

## 2019-04-22 PROCEDURE — 80076 HEPATIC FUNCTION PANEL: CPT

## 2019-04-22 PROCEDURE — 36415 COLL VENOUS BLD VENIPUNCTURE: CPT

## 2019-04-26 DIAGNOSIS — J43.1 PANLOBULAR EMPHYSEMA: ICD-10-CM

## 2019-04-26 RX ORDER — THEOPHYLLINE 300 MG/1
300 TABLET, EXTENDED RELEASE ORAL 2 TIMES DAILY
Qty: 180 TABLET | Refills: 1 | Status: SHIPPED | OUTPATIENT
Start: 2019-04-26 | End: 2019-01-01 | Stop reason: SDUPTHER

## 2019-05-06 ENCOUNTER — OFFICE VISIT (OUTPATIENT)
Dept: CARDIOLOGY | Facility: CLINIC | Age: 69
End: 2019-05-06
Payer: MEDICARE

## 2019-05-06 VITALS
RESPIRATION RATE: 15 BRPM | BODY MASS INDEX: 44.88 KG/M2 | OXYGEN SATURATION: 95 % | HEART RATE: 84 BPM | HEIGHT: 63 IN | DIASTOLIC BLOOD PRESSURE: 72 MMHG | WEIGHT: 253.31 LBS | SYSTOLIC BLOOD PRESSURE: 126 MMHG

## 2019-05-06 DIAGNOSIS — J43.1 PANLOBULAR EMPHYSEMA: ICD-10-CM

## 2019-05-06 DIAGNOSIS — E78.2 MIXED HYPERLIPIDEMIA: ICD-10-CM

## 2019-05-06 DIAGNOSIS — I83.813 VARICOSE VEINS OF BOTH LOWER EXTREMITIES WITH PAIN: ICD-10-CM

## 2019-05-06 DIAGNOSIS — E11.9 TYPE 2 DIABETES MELLITUS WITHOUT COMPLICATION, WITHOUT LONG-TERM CURRENT USE OF INSULIN: ICD-10-CM

## 2019-05-06 DIAGNOSIS — E66.01 MORBID OBESITY, UNSPECIFIED OBESITY TYPE: ICD-10-CM

## 2019-05-06 DIAGNOSIS — I10 HYPERTENSION, ESSENTIAL: Primary | ICD-10-CM

## 2019-05-06 DIAGNOSIS — I65.21 STENOSIS OF RIGHT CAROTID ARTERY: ICD-10-CM

## 2019-05-06 PROCEDURE — 99999 PR PBB SHADOW E&M-EST. PATIENT-LVL III: CPT | Mod: PBBFAC,,, | Performed by: INTERNAL MEDICINE

## 2019-05-06 PROCEDURE — 99214 PR OFFICE/OUTPT VISIT, EST, LEVL IV, 30-39 MIN: ICD-10-PCS | Mod: S$PBB,,, | Performed by: INTERNAL MEDICINE

## 2019-05-06 PROCEDURE — 99214 OFFICE O/P EST MOD 30 MIN: CPT | Mod: S$PBB,,, | Performed by: INTERNAL MEDICINE

## 2019-05-06 PROCEDURE — 99213 OFFICE O/P EST LOW 20 MIN: CPT | Mod: PBBFAC | Performed by: INTERNAL MEDICINE

## 2019-05-06 PROCEDURE — 99999 PR PBB SHADOW E&M-EST. PATIENT-LVL III: ICD-10-PCS | Mod: PBBFAC,,, | Performed by: INTERNAL MEDICINE

## 2019-05-06 NOTE — PROGRESS NOTES
CARDIOVASCULAR PROGRESS NOTE    REASON FOR CONSULT:   Johanna Mancini is a 69 y.o. female who presents for f/u htn, hlp.    Vasc: Sivakumar  PCP: Page  HISTORY OF PRESENT ILLNESS:   The patient returns for follow-up.  She continues to complain of stable dyspnea and is on home O2 for her COPD.  She has had no anginal symptoms.  She denies any palpitations, lightheadedness, dizziness, or syncope.  There has been no PND, orthopnea, melena, hematuria, or claudicant symptoms.    CARDIOVASCULAR HISTORY:   Carotid stenosis R>L, followed by Dr. Shaver  [facility]    PAST MEDICAL HISTORY:     Past Medical History:   Diagnosis Date    Articular gout     Carotid artery occlusion     CHF (congestive heart failure)     COPD (chronic obstructive pulmonary disease)     Diabetes mellitus, type 2     Hyperlipidemia     Hypertension     Osteopetrosis     Osteoporosis     Sleep apnea        PAST SURGICAL HISTORY:     Past Surgical History:   Procedure Laterality Date    CHOLECYSTECTOMY      HYSTERECTOMY      MINERVA equivalent       ALLERGIES AND MEDICATION:     Review of patient's allergies indicates:   Allergen Reactions    Tylenol [acetaminophen] Anaphylaxis    Levofloxacin Itching        Medication List           Accurate as of 5/6/19  3:05 PM. If you have any questions, ask your nurse or doctor.               CONTINUE taking these medications    albuterol 2.5 mg /3 mL (0.083 %) nebulizer solution  Commonly known as:  PROVENTIL  Take 3 mLs (2.5 mg total) by nebulization every 6 (six) hours as needed for Wheezing.     allopurinol 100 MG tablet  Commonly known as:  ZYLOPRIM  TAKE 1 TABLET(100 MG) BY MOUTH EVERY DAY     aspirin 81 MG EC tablet  Commonly known as:  ECOTRIN  Take 1 tablet (81 mg total) by mouth once daily.     atorvastatin 80 MG tablet  Commonly known as:  LIPITOR  Take 1 tablet (80 mg total) by mouth once daily.     blood sugar diagnostic Strp  TEST BLOOD SUGARS  TWICE DAILY FOR TRUMETRIX GLUCOMETER      blood-glucose meter Misc  TEST BLOOD SUGAR AS DIRECTED WITH TRUE METRIX MACHINE     diltiaZEM 240 MG 24 hr capsule  Commonly known as:  CARDIZEM CD  Take 1 capsule (240 mg total) by mouth once daily.     hydroCHLOROthiazide 25 MG tablet  Commonly known as:  HYDRODIURIL  TAKE 1 TABLET(25 MG) BY MOUTH EVERY DAY     ibuprofen 200 MG tablet  Commonly known as:  ADVIL,MOTRIN  Take 1 tablet (200 mg total) by mouth every 6 (six) hours as needed.     ipratropium 0.02 % nebulizer solution  Commonly known as:  ATROVENT     lancets Misc  Commonly known as:  ONETOUCH ULTRASOFT LANCETS  USE WITH TRUE METRIX METER AND TESTS STRIPS twice daily     metFORMIN 500 MG tablet  Commonly known as:  GLUCOPHAGE  TAKE 1 TABLET(500 MG) BY MOUTH DAILY WITH BREAKFAST     SYMBICORT 160-4.5 mcg/actuation Hfaa  Generic drug:  budesonide-formoterol 160-4.5 mcg     theophylline 300 MG 12 hr tablet  Commonly known as:  THEODUR  Take 1 tablet (300 mg total) by mouth 2 (two) times daily.            SOCIAL HISTORY:     Social History     Socioeconomic History    Marital status:      Spouse name: Not on file    Number of children: Not on file    Years of education: Not on file    Highest education level: Not on file   Occupational History    Not on file   Social Needs    Financial resource strain: Not on file    Food insecurity:     Worry: Not on file     Inability: Not on file    Transportation needs:     Medical: Not on file     Non-medical: Not on file   Tobacco Use    Smoking status: Former Smoker    Smokeless tobacco: Never Used   Substance and Sexual Activity    Alcohol use: No    Drug use: No    Sexual activity: Not on file   Lifestyle    Physical activity:     Days per week: Not on file     Minutes per session: Not on file    Stress: Not on file   Relationships    Social connections:     Talks on phone: Not on file     Gets together: Not on file     Attends Voodoo service: Not on file     Active member of club or  "organization: Not on file     Attends meetings of clubs or organizations: Not on file     Relationship status: Not on file   Other Topics Concern    Not on file   Social History Narrative    .  Three children. Former smoker, quit 15 yr ago.  Retired , cook and kitchen work.        FAMILY HISTORY:     Family History   Problem Relation Age of Onset    Heart disease Mother     Cancer Father     Cancer Sister         breast       REVIEW OF SYSTEMS:   Review of Systems   Constitutional: Negative for chills, diaphoresis and fever.   HENT: Negative for nosebleeds.    Eyes: Negative for blurred vision, double vision and photophobia.   Respiratory: Positive for shortness of breath. Negative for hemoptysis and wheezing.    Cardiovascular: Negative for chest pain, palpitations, orthopnea, claudication, leg swelling and PND.   Gastrointestinal: Negative for abdominal pain, blood in stool, heartburn, melena, nausea and vomiting.   Genitourinary: Negative for flank pain and hematuria.   Musculoskeletal: Negative for falls, myalgias and neck pain.   Skin: Negative for rash.   Neurological: Negative for dizziness, seizures, loss of consciousness, weakness and headaches.   Endo/Heme/Allergies: Negative for polydipsia. Does not bruise/bleed easily.   Psychiatric/Behavioral: Negative for depression and memory loss. The patient is not nervous/anxious.        PHYSICAL EXAM:     Vitals:    05/06/19 1445   BP: 126/72   Pulse: 84   Resp: 15    Body mass index is 44.87 kg/m².  Weight: 114.9 kg (253 lb 4.9 oz)   Height: 5' 3" (160 cm)     Physical Exam   Constitutional: She is oriented to person, place, and time. She appears well-developed and well-nourished. She is cooperative.  Non-toxic appearance. No distress.   HENT:   Head: Normocephalic and atraumatic.   Eyes: Pupils are equal, round, and reactive to light. Conjunctivae and EOM are normal. No scleral icterus.   Neck: Trachea normal and normal range of motion. Neck " supple. Normal carotid pulses and no JVD present. Carotid bruit is not present. No neck rigidity. No tracheal deviation and no edema present. No thyromegaly present.   Cardiovascular: Normal rate, regular rhythm, S1 normal and S2 normal. PMI is not displaced. Exam reveals no gallop and no friction rub.   No murmur heard.  Pulses:       Carotid pulses are 2+ on the right side, and 2+ on the left side.  Pulmonary/Chest: Effort normal and breath sounds normal. No stridor. No respiratory distress. She has no wheezes. She has no rales. She exhibits no tenderness.   Abdominal: Soft. She exhibits no distension. There is no hepatosplenomegaly.   obese   Musculoskeletal: Normal range of motion. She exhibits no edema or tenderness.   Feet:   Right Foot:   Skin Integrity: Negative for ulcer.   Left Foot:   Skin Integrity: Negative for ulcer.   Neurological: She is alert and oriented to person, place, and time. No cranial nerve deficit.   Skin: Skin is warm and dry. No rash noted. No erythema.   Psychiatric: She has a normal mood and affect. Her speech is normal and behavior is normal.   Vitals reviewed.      DATA:   EKG: (personally reviewed tracing)  4/8/19 SR 90, low volt, NSSTTW changes, similar to 10/15/18    Laboratory:  CBC:  Recent Labs   Lab 01/25/18  1810   WHITE BLOOD CELL COUNT 5.85   HEMOGLOBIN 13.7   HEMATOCRIT 43.3   PLATELETS 174       CHEMISTRIES:  Recent Labs   Lab 10/16/17  1125 01/25/18  1810   7452 GLUCOSE 111 H  --    GLUCOSE  --  103   7448 SODIUM 142  --    SODIUM  --  141   POTASSIUM 4.3 3.8   BUN BLD 15 18   CREATININE  --  0.8   7451 CREATININE 0.65  --    EGFR IF   --  >60   EGFR IF NON-  --  >60   CALCIUM 9.3 9.9       CARDIAC BIOMARKERS:  Recent Labs   Lab 01/25/18  1810   TROPONIN I <0.006       COAGS:        LIPIDS/LFTS:  Recent Labs   Lab 10/16/17  1125 01/25/18  1810 04/22/19  1121   CHOLESTEROL  --   --  130   TRIGLYCERIDES  --   --  109   HDL  --   --  52   LDL  CHOLESTEROL  --   --  56.2 L   NON-HDL CHOLESTEROL  --   --  78   AST 19 19 15   ALT  --  18 13       Cardiovascular Testing:  Aortic US 3/25/19  Abdominal aortic ultrasound shows maximal aortic diameter of 2.65 x 2.45 at the infrarenal level.  There is no iliac artery aneurysm.      Carotid US 3/14/19 (similar to report 9/27/18)  1. Right carotid ultrasound shows 80-99% internal carotid artery stenosis.  Antegrade vertebral artery flow.  2. Left carotid ultrasound shows  60-79% internal carotid artery stenosis.  Antegrade vertebral artery flow.    Tracy MPI 10/15/18   · The perfusion scan is free of evidence from myocardial ischemia or injury.  · Post Stress Ejection Fraction is 73 %    Echo 10/15/18  · Left ventricle ejection fraction is normal at 60%  · Left ventricle shows concentric remodeling.  · Normal LV diastolic function.  · RV systolic function is normal.  · Mild aortic valve stenosis.  · KOLBY is 2.72 cm2; peak velocity is 1.62 m/s; mean gradient is 6.36 mmHg.    LE venous US/reflux 6/20/18  1. There is hemodynamically significant venous reflux (reflux lasting greater than 500 ms) within the bilateral greater saphenous vein.  2. There is no evidence of bilateral lower extremity deep venous thrombosis.    ASSESSMENT:   # PARIKH, chronic, normal echo/MPI 10/2018  # HTN, controlled  # O2 dep COPD, pulm eval pending (at [facility])  # HLP, on atorva 40mg  # DM  # BMI 45, down 1 unit vs last OV  # [facility]    PLAN:   Cont med rx  RTC 6 months    Desean Monroe MD, FACC

## 2019-05-22 ENCOUNTER — TELEPHONE (OUTPATIENT)
Dept: PODIATRY | Facility: CLINIC | Age: 69
End: 2019-05-22

## 2019-05-22 NOTE — TELEPHONE ENCOUNTER
----- Message from Stephenalvin Bryan sent at 5/22/2019  1:36 PM CDT -----  Contact: Trevor Rivera  Type: Patient Call Back    Who called:Self    What is the request in detail: original order submitted with one pair of insert but this shoe always dispense three. A form was faxed to [phone] for you to complete.    Can the clinic reply by [facility]? no    Would the patient rather a call back or a response via [facility]? call    Best call back number:[phone] ext [phone]

## 2019-06-09 DIAGNOSIS — I10 HYPERTENSION, ESSENTIAL: ICD-10-CM

## 2019-06-09 DIAGNOSIS — E11.9 TYPE 2 DIABETES MELLITUS WITHOUT COMPLICATION, WITHOUT LONG-TERM CURRENT USE OF INSULIN: ICD-10-CM

## 2019-06-10 DIAGNOSIS — I10 HYPERTENSION, ESSENTIAL: ICD-10-CM

## 2019-06-10 RX ORDER — HYDROCHLOROTHIAZIDE 25 MG/1
TABLET ORAL
Qty: 90 TABLET | Refills: 0 | Status: SHIPPED | OUTPATIENT
Start: 2019-06-10 | End: 2019-06-28 | Stop reason: SDUPTHER

## 2019-06-10 RX ORDER — CLONIDINE HYDROCHLORIDE 0.1 MG/1
TABLET ORAL
Qty: 180 TABLET | Refills: 0 | Status: SHIPPED | OUTPATIENT
Start: 2019-06-10 | End: 2019-01-01

## 2019-06-10 RX ORDER — METFORMIN HYDROCHLORIDE 500 MG/1
TABLET ORAL
Qty: 90 TABLET | Refills: 0 | Status: SHIPPED | OUTPATIENT
Start: 2019-06-10 | End: 2019-06-28 | Stop reason: SDUPTHER

## 2019-06-10 RX ORDER — LISINOPRIL 2.5 MG/1
TABLET ORAL
Qty: 90 TABLET | Refills: 0 | Status: SHIPPED | OUTPATIENT
Start: 2019-06-10 | End: 2019-01-01

## 2019-06-28 DIAGNOSIS — E11.9 TYPE 2 DIABETES MELLITUS WITHOUT COMPLICATION, WITHOUT LONG-TERM CURRENT USE OF INSULIN: ICD-10-CM

## 2019-06-28 DIAGNOSIS — I10 HYPERTENSION, ESSENTIAL: ICD-10-CM

## 2019-06-28 RX ORDER — METFORMIN HYDROCHLORIDE 500 MG/1
TABLET ORAL
Qty: 90 TABLET | Refills: 0 | Status: SHIPPED | OUTPATIENT
Start: 2019-06-28 | End: 2019-01-01 | Stop reason: SDUPTHER

## 2019-06-28 RX ORDER — HYDROCHLOROTHIAZIDE 25 MG/1
TABLET ORAL
Qty: 90 TABLET | Refills: 0 | Status: SHIPPED | OUTPATIENT
Start: 2019-06-28 | End: 2019-01-01

## 2019-10-04 NOTE — MEDICAL/APP STUDENT
Subjective:       Patient ID: Johanna Mancini is a 69 y.o. female.    Chief Complaint: COPD    HPI   Miss Mancini is a 68 y/o, F, PMHx COPD, HTN, HLD, DM presented for a follow-up visit. She reported that after she got d/c from the hospital which she was admitted due to COPD exacerbation she has been feeling weak, but home health have been helping her with her ambulation. She reported that she is feeling OK today, had three breathing treatment and helped with her breathing. She also reported that her BLE edema is getting worse after d/c and have been taking half a lasix. She elevates her legs when she  She denies any fever, chills, chest pain other other physical complains.     Past Medical History:   Diagnosis Date    Articular gout     Carotid artery occlusion     CHF (congestive heart failure)     COPD (chronic obstructive pulmonary disease)     Diabetes mellitus, type 2     Hyperlipidemia     Hypertension     Osteopetrosis     Osteoporosis     Sleep apnea      Past Surgical History:   Procedure Laterality Date    CHOLECYSTECTOMY      HYSTERECTOMY      MINERVA equivalent     Outpatient Medications as of 10/4/2019   Medication Sig Dispense Refill    albuterol (PROVENTIL) 2.5 mg /3 mL (0.083 %) nebulizer solution Take 3 mLs (2.5 mg total) by nebulization every 6 (six) hours as needed for Wheezing. 300 mL 1    allopurinol (ZYLOPRIM) 100 MG tablet TAKE 1 TABLET(100 MG) BY MOUTH EVERY DAY 8100 tablet 1    aspirin (ECOTRIN) 81 MG EC tablet Take 1 tablet (81 mg total) by mouth once daily. 90 tablet 3    atorvastatin (LIPITOR) 80 MG tablet Take 1 tablet (80 mg total) by mouth once daily. 90 tablet 3    blood-glucose meter kit To check BG 2 times daily, to use with insurance preferred meter 1 each 0    budesonide-formoterol 160-4.5 mcg (SYMBICORT) 160-4.5 mcg/actuation HFAA Inhale into the lungs.      cloNIDine (CATAPRES) 0.1 MG tablet TAKE 1 TABLET(0.1 MG) BY MOUTH TWICE DAILY 180 tablet 0    diltiaZEM  (CARDIZEM CD) 240 MG 24 hr capsule Take 1 capsule (240 mg total) by mouth once daily. 90 capsule 3    hydroCHLOROthiazide (HYDRODIURIL) 25 MG tablet TAKE 1 TABLET(25 MG) BY MOUTH EVERY DAY 90 tablet 0    ibuprofen (ADVIL,MOTRIN) 200 MG tablet Take 1 tablet (200 mg total) by mouth every 6 (six) hours as needed.      ipratropium (ATROVENT) 0.02 % nebulizer solution Inhale into the lungs.      lisinopril (PRINIVIL,ZESTRIL) 2.5 MG tablet TAKE 1 TABLET(2.5 MG) BY MOUTH EVERY DAY 90 tablet 0    metFORMIN (GLUCOPHAGE) 500 MG tablet TAKE 1 TABLET(500 MG) BY MOUTH DAILY WITH BREAKFAST 90 tablet 0    theophylline (THEODUR) 300 MG 12 hr tablet Take 1 tablet (300 mg total) by mouth 2 (two) times daily. 180 tablet 1     No current facility-administered medications on file as of 10/4/2019.          Review of Systems   Constitutional: Negative for activity change, chills, fatigue and fever.   HENT: Negative for facial swelling and sinus pressure.    Respiratory: Negative for apnea, chest tightness, shortness of breath, wheezing and stridor.    Cardiovascular: Positive for leg swelling. Negative for chest pain and palpitations.   Gastrointestinal: Negative for abdominal distention, abdominal pain, constipation and diarrhea.   Endocrine: Negative for polyuria.   Genitourinary: Negative for difficulty urinating.   Musculoskeletal: Positive for back pain.   Skin: Negative for color change.   Allergic/Immunologic: Negative for food allergies.   Neurological: Negative for dizziness.   Psychiatric/Behavioral: Negative for agitation.       Objective:      Physical Exam   Constitutional: She appears well-developed and well-nourished. No distress.   HENT:   Head: Normocephalic and atraumatic.   Eyes: Pupils are equal, round, and reactive to light.   Neck: Normal range of motion.   Cardiovascular: Normal rate, regular rhythm and normal heart sounds. Exam reveals no gallop and no friction rub.   No murmur heard.  Pulmonary/Chest: Effort  normal and breath sounds normal. No stridor. No respiratory distress. She has no wheezes.   Abdominal: Soft. Bowel sounds are normal. She exhibits no distension and no mass. There is no tenderness. There is no guarding.   Skin: She is not diaphoretic.       Assessment:       1. COPD with acute exacerbation    2. Type 2 diabetes mellitus without complication, without long-term current use of insulin    3. Morbid obesity, unspecified obesity type        Plan:   COPD with acute exacerbation  -     Comprehensive metabolic panel; Future  -     predniSONE (DELTASONE) 20 MG tablet; Take 3 tablets (60 mg total) by mouth once daily. for 5 days  -     doxycycline (VIBRAMYCIN) 100 MG Cap; Take 1 capsule (100 mg total) by mouth every 12 (twelve) hours. for 10 days     Type 2 diabetes mellitus without complication, without long-term current use of insulin  -     Hemoglobin A1c; Future  -     blood-glucose meter kit; To check BG 2 times daily, to use with insurance preferred meter  -     lancets Misc; To check BG 2 times daily, to use with insurance preferred meter  -     blood sugar diagnostic Strp; To check BG 2 times daily, to use with insurance preferred meter     Morbid obesity, unspecified obesity type

## 2019-10-04 NOTE — PROGRESS NOTES
"Routine Office Visit    Patient Name: Johanna Mancini    : 1950  MRN: [id number]    Subjective:  Johanna is a 69 y.o. female who presents today for:    1. Follow up  Patient was seen at [facility] for COPD exacerbation.  She states "you would only tell me to take mucinex without coming in".  Patient has stated this before that she feels she shouldn't have to come in every time she has a flare.  She has severe COPD and is O2 dependent.  She has not been routinely following up with pulmonary, but has an appointment next week with Dr. Castro.  She states she has been using her nebulizers routinely as her inhalers are too hard to use.  She is not short of breath at this time and her cough has improved.  There have been no fevers, sweats, or increased fatigue.      Past Medical History  Past Medical History:   Diagnosis Date    Articular gout     Carotid artery occlusion     CHF (congestive heart failure)     COPD (chronic obstructive pulmonary disease)     Diabetes mellitus, type 2     Hyperlipidemia     Hypertension     Osteopetrosis     Osteoporosis     Sleep apnea        Past Surgical History  Past Surgical History:   Procedure Laterality Date    CHOLECYSTECTOMY      HYSTERECTOMY      MINERVA equivalent       Family History  Family History   Problem Relation Age of Onset    Heart disease Mother     Cancer Father     Cancer Sister         breast       Social History  Social History     Socioeconomic History    Marital status:      Spouse name: Not on file    Number of children: Not on file    Years of education: Not on file    Highest education level: Not on file   Occupational History    Not on file   Social Needs    Financial resource strain: Not on file    Food insecurity:     Worry: Not on file     Inability: Not on file    Transportation needs:     Medical: Not on file     Non-medical: Not on file   Tobacco Use    Smoking status: Former Smoker    Smokeless tobacco: Never Used "   Substance and Sexual Activity    Alcohol use: No    Drug use: No    Sexual activity: Not on file   Lifestyle    Physical activity:     Days per week: Not on file     Minutes per session: Not on file    Stress: Not on file   Relationships    Social connections:     Talks on phone: Not on file     Gets together: Not on file     Attends Alevism service: Not on file     Active member of club or organization: Not on file     Attends meetings of clubs or organizations: Not on file     Relationship status: Not on file   Other Topics Concern    Not on file   Social History Narrative    .  Three children. Former smoker, quit 15 yr ago.  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        Current Medications  Current Outpatient Medications on File Prior to Visit   Medication Sig Dispense Refill    albuterol (PROVENTIL) 2.5 mg /3 mL (0.083 %) nebulizer solution Take 3 mLs (2.5 mg total) by nebulization every 6 (six) hours as needed for Wheezing. 300 mL 1    allopurinol (ZYLOPRIM) 100 MG tablet TAKE 1 TABLET(100 MG) BY MOUTH EVERY DAY 8100 tablet 1    aspirin (ECOTRIN) 81 MG EC tablet Take 1 tablet (81 mg total) by mouth once daily. 90 tablet 3    atorvastatin (LIPITOR) 80 MG tablet Take 1 tablet (80 mg total) by mouth once daily. 90 tablet 3    budesonide-formoterol 160-4.5 mcg (SYMBICORT) 160-4.5 mcg/actuation HFAA Inhale into the lungs.      cloNIDine (CATAPRES) 0.1 MG tablet TAKE 1 TABLET(0.1 MG) BY MOUTH TWICE DAILY 180 tablet 0    diltiaZEM (CARDIZEM CD) 240 MG 24 hr capsule Take 1 capsule (240 mg total) by mouth once daily. 90 capsule 3    hydroCHLOROthiazide (HYDRODIURIL) 25 MG tablet TAKE 1 TABLET(25 MG) BY MOUTH EVERY DAY 90 tablet 0    ibuprofen (ADVIL,MOTRIN) 200 MG tablet Take 1 tablet (200 mg total) by mouth every 6 (six) hours as needed.      ipratropium (ATROVENT) 0.02 % nebulizer solution Inhale into the lungs.      lisinopril (PRINIVIL,ZESTRIL) 2.5 MG tablet TAKE 1 TABLET(2.5 MG) BY  "MOUTH EVERY DAY 90 tablet 0    metFORMIN (GLUCOPHAGE) 500 MG tablet TAKE 1 TABLET(500 MG) BY MOUTH DAILY WITH BREAKFAST 90 tablet 0    theophylline (THEODUR) 300 MG 12 hr tablet Take 1 tablet (300 mg total) by mouth 2 (two) times daily. 180 tablet 1    [DISCONTINUED] blood sugar diagnostic Strp TEST BLOOD SUGARS  TWICE DAILY FOR TRUMETRIX GLUCOMETER 200 strip 0    [DISCONTINUED] blood-glucose meter Misc TEST BLOOD SUGAR AS DIRECTED WITH TRUE METRIX MACHINE 1 each 0    [DISCONTINUED] lancets (ONETOUCH ULTRASOFT LANCETS) Misc USE WITH TRUE METRIX METER AND TESTS STRIPS twice daily 200 each 0     No current facility-administered medications on file prior to visit.        Allergies   Review of patient's allergies indicates:   Allergen Reactions    Tylenol [acetaminophen] Anaphylaxis    Hydrocodone-acetaminophen Itching    Levofloxacin Itching       Review of Systems (Pertinent positives)  Review of Systems   Constitutional: Negative.    HENT: Negative.    Eyes: Negative.    Respiratory: Positive for cough and sputum production. Negative for shortness of breath and wheezing.    Cardiovascular: Negative.    Gastrointestinal: Negative.    Musculoskeletal: Negative.    Skin: Negative.    Neurological: Negative.          /78 (BP Location: Left arm, Patient Position: Sitting, BP Method: Medium (Automatic))   Pulse 87   Temp 97.9 °F (36.6 °C) (Oral)   Resp 17   Ht 5' 2.99" (1.6 m)   Wt 116.5 kg (256 lb 13.4 oz)   SpO2 (!) 86%   BMI 45.51 kg/m²     GENERAL APPEARANCE: in no apparent distress and well developed and well nourished  HEENT: PERRL, EOMI, Sclera clear, anicteric, Oropharynx clear, no lesions, Neck supple with midline trachea  NECK: normal, supple, no adenopathy, thyroid normal in size  RESPIRATORY: appears well, vitals normal, no respiratory distress, acyanotic, normal RR, chest clear, no wheezing, crepitations, rhonchi, normal symmetric air entry  HEART: regular rate and rhythm, S1, S2 normal, " no murmur, click, rub or gallop.    ABDOMEN: abdomen is soft without tenderness, no masses, no hernias, no organomegaly, no rebound, no guarding. Suprapubic tenderness absent. No CVA tenderness.  SKIN: no rashes, no wounds, no other lesions  PSYCH: Alert, oriented x 3, thought content appropriate, speech normal, pleasant and cooperative, good eye contact, well groomed    Assessment/Plan:  Johanna Mancini is a 69 y.o. female who presents today for :    Johanna was seen today for copd.    Diagnoses and all orders for this visit:    COPD with acute exacerbation  -     Comprehensive metabolic panel; Future  -     predniSONE (DELTASONE) 20 MG tablet; Take 3 tablets (60 mg total) by mouth once daily. for 5 days  -     doxycycline (VIBRAMYCIN) 100 MG Cap; Take 1 capsule (100 mg total) by mouth every 12 (twelve) hours. for 10 days    Type 2 diabetes mellitus without complication, without long-term current use of insulin  -     Hemoglobin A1c; Future  -     blood-glucose meter kit; To check BG 2 times daily, to use with insurance preferred meter  -     lancets Misc; To check BG 2 times daily, to use with insurance preferred meter  -     blood sugar diagnostic Strp; To check BG 2 times daily, to use with insurance preferred meter    Morbid obesity, unspecified obesity type      1.  Labs to be done today  2.  Gave medications as prophylaxis in the event she starts to feel bad again  3.  She is to keep pulmonary appointment  4.  New meter for diabetes sent in  5.  Encouraged weight loss  6.  Follow up as needed      Johnson Mcintosh MD

## 2019-10-09 NOTE — ED PROVIDER NOTES
"Encounter Date: 10/9/2019       History     Chief Complaint   Patient presents with    Abdominal Pain     abd pain begining last night after bowel movement. Pt has umbilical hernia     69 y.o. female Past Medical History:  No date: Articular gout  No date: Carotid artery occlusion  No date: CHF (congestive heart failure)  No date: COPD (chronic obstructive pulmonary disease)  No date: Diabetes mellitus, type 2  No date: Hyperlipidemia  No date: Hypertension  No date: Osteopetrosis  No date: Osteoporosis  No date: Sleep apnea     Presents today for evaluation of abd pain now resolved. Pt notes that she initially felt constipated x 1 day so she took a laxative, endorses having a "very large BM this AM" and states that she had vague abd discomfort afterwards which "felt like she had to go more". States that her symptoms have resolved. Denies pain at hernia site.         Review of patient's allergies indicates:   Allergen Reactions    Tylenol [acetaminophen] Anaphylaxis    Hydrocodone-acetaminophen Itching    Levofloxacin Itching     Past Medical History:   Diagnosis Date    Articular gout     Carotid artery occlusion     CHF (congestive heart failure)     COPD (chronic obstructive pulmonary disease)     Diabetes mellitus, type 2     Hyperlipidemia     Hypertension     Osteopetrosis     Osteoporosis     Sleep apnea      Past Surgical History:   Procedure Laterality Date    CHOLECYSTECTOMY      HYSTERECTOMY      MINERVA equivalent     Family History   Problem Relation Age of Onset    Heart disease Mother     Cancer Father     Cancer Sister         breast     Social History     Tobacco Use    Smoking status: Former Smoker    Smokeless tobacco: Never Used   Substance Use Topics    Alcohol use: No    Drug use: No     Review of Systems   Constitutional: Negative for fever.   HENT: Negative for sore throat.    Respiratory: Negative for shortness of breath.    Cardiovascular: Negative for chest pain. "   Gastrointestinal: Positive for abdominal pain. Negative for nausea.   Genitourinary: Negative for dysuria.   Musculoskeletal: Negative for back pain.   Skin: Negative for rash.   Neurological: Negative for weakness.   Hematological: Does not bruise/bleed easily.   All other systems reviewed and are negative.      Physical Exam     Initial Vitals [10/09/19 1717]   BP Pulse Resp Temp SpO2   136/80 88 18 98 °F (36.7 °C) 97 %      MAP       --         Physical Exam    Nursing note and vitals reviewed.  Constitutional: She appears well-developed and well-nourished.   HENT:   Head: Normocephalic and atraumatic.   Eyes: Conjunctivae and EOM are normal. Pupils are equal, round, and reactive to light.   Neck: Normal range of motion.   Cardiovascular: Normal rate.   Pulmonary/Chest: No respiratory distress.   Abdominal: Soft. She exhibits no distension. There is no tenderness. There is no rebound and no guarding.   Musculoskeletal: Normal range of motion.   Neurological: She is alert. No cranial nerve deficit. GCS score is 15. GCS eye subscore is 4. GCS verbal subscore is 5. GCS motor subscore is 6.   Skin: Skin is warm and dry.   Psychiatric: She has a normal mood and affect. Thought content normal.         ED Course   Procedures  Labs Reviewed - No data to display       Imaging Results    None                          X-Ray Abdomen Flat And Erect   Final Result      Nonobstructive bowel gas pattern.      Nonspecific ovoid radiopaque object over the right mid abdomen.  Uncertain if this may be external to the patient.         Electronically signed by: Sarai Deal MD   Date:    10/09/2019   Time:    18:22          Will discharge pt on general meds for abd pain        Clinical Impression:       ICD-10-CM ICD-9-CM   1. Abdominal pain, unspecified abdominal location R10.9 789.00   2. Abdominal pain R10.9 789.00                                Demetrio Zapata MD  10/09/19 6388       Demetrio Zapata,  MD  10/21/19 0841

## 2019-10-09 NOTE — DISCHARGE INSTRUCTIONS
Thank you for coming to our Emergency Department today. It is important to remember that some problems are difficult to diagnose and may not be found during your first visit. Be sure to follow up with your primary care doctor and review any labs/imaging that was performed with them. If you do not have a primary care doctor, you may contact the one listed on your discharge paperwork or you may also call the [facility] Appointment Desk at [phone] to schedule an appointment with one.     All medications may potentially have side effects and it is impossible to predict which medications may give you side effects. If you feel that you are having a negative effect of any medication you should immediately stop taking them and seek medical attention.    Return to the ER with any questions/concerns, new/concerning symptoms, worsening or failure to improve. Do not drive or make any important decisions for 24 hours if you have received any pain medications, sedatives or mood altering drugs during your ER visit.

## 2019-10-09 NOTE — ED TRIAGE NOTES
"Pt cooking in kitchen when she reports sharp sudden abdominal pain. Pt with hx of hernia. Pt noticed belly button herniated. Pt laying in bed currently with no complaints of pain. Pt stated : it feels ok now."   "

## 2019-10-09 NOTE — TELEPHONE ENCOUNTER
----- Message from Kerri Chavez sent at 10/9/2019  9:37 AM CDT -----  Contact: Self   Type: Patient Call Back    Who called: Self     What is the request in detail:patient would like to ask the doctor a question about how to take her Lasix because she is very swollen. Please call     Can the clinic reply by [facility]? No     Would the patient rather a call back or a response via [facility]? Call     Best call back number:[phone]

## 2019-10-09 NOTE — TELEPHONE ENCOUNTER
Didn't see Lasix on pt medication list , please advise on instructions . Contacted pt for further information , NA .  LOV 10/4/19

## 2019-10-15 NOTE — TELEPHONE ENCOUNTER
----- Message from Johnson Mcintosh MD sent at 10/15/2019  8:02 AM CDT -----  Please let patient know that labs were fine.      Thanks  Dr. Mcintosh

## 2019-10-17 NOTE — TELEPHONE ENCOUNTER
I called and spoke to the pt and Nora and they are faxing us a CMS form to fill out for Pt test strips.

## 2019-10-17 NOTE — TELEPHONE ENCOUNTER
----- Message from Murtaza Silva sent at 10/17/2019  4:00 PM CDT -----  Contact: DARIELA KURTZ [[id number]]  Name of Who is Calling:DARIELA KURTZ [[id number]]       What is the request in detail: Pt is requesting a call back from clinical team in regard to her test Strips for her metier    Please contact to further discuss and advise.          Can the clinic reply by [facility]:       What Number to Call Back if not in [facility]: [phone]

## 2019-10-29 NOTE — TELEPHONE ENCOUNTER
----- Message from Yvette Castellon sent at 10/29/2019 10:04 AM CDT -----  Contact: Self            Name of Who is Calling: DARIELA KURTZ [[id number]]      What is the request in detail: Pt states [employer] sent a form to be completed by the Provider to be sent to the pt's insurance for approval. Pt states she spoke with her insurance and was advised the form has not been received. Pt is asking for the status. Please contact to further discuss and advise.        Can the clinic reply by [facility]: N      What Number to Call Back if not in [facility]: [phone] or [phone]

## 2019-11-07 NOTE — TELEPHONE ENCOUNTER
Last Office Visit Info:   The patient's last visit with Johnson Mcintosh MD was on 10/4/2019.    The patient's last visit in current department was on 10/4/2019.        Last CBC Results:   Lab Results   Component Value Date    WBC 5.85 01/25/2018    HGB 13.7 01/25/2018    HCT 43.3 01/25/2018     01/25/2018       Last CMP Results  Lab Results   Component Value Date     10/04/2019    K 3.5 10/04/2019    CL 96 10/04/2019    CO2 37 (H) 10/04/2019    BUN 16 10/04/2019    CREATININE 0.7 10/04/2019    CALCIUM 9.5 10/04/2019    ALBUMIN 3.5 10/04/2019    AST 17 10/04/2019    ALT 25 10/04/2019       Last Lipids  Lab Results   Component Value Date    CHOL 130 04/22/2019    TRIG 109 04/22/2019    HDL 52 04/22/2019    LDLCALC 56.2 (L) 04/22/2019       Last A1C  Lab Results   Component Value Date    HGBA1C 6.6 (H) 10/04/2019       Last TSH  No results found for: TSH      Current Med Refills  Medication List with Changes/Refills   Current Medications    ALBUTEROL (PROVENTIL) 2.5 MG /3 ML (0.083 %) NEBULIZER SOLUTION    Take 3 mLs (2.5 mg total) by nebulization every 6 (six) hours as needed for Wheezing.       Start Date: 9/3/2019  End Date: --    ALLOPURINOL (ZYLOPRIM) 100 MG TABLET    TAKE 1 TABLET(100 MG) BY MOUTH EVERY DAY       Start Date: 3/4/2019  End Date: --    ASPIRIN (ECOTRIN) 81 MG EC TABLET    Take 1 tablet (81 mg total) by mouth once daily.       Start Date: 10/8/2018 End Date: --    ATORVASTATIN (LIPITOR) 80 MG TABLET    Take 1 tablet (80 mg total) by mouth once daily.       Start Date: 3/15/2019 End Date: 3/14/2020    BLOOD SUGAR DIAGNOSTIC STRP    To check BG 2 times daily, to use with insurance preferred meter       Start Date: 10/15/2019End Date: --    BLOOD-GLUCOSE METER KIT    To check BG 2 times daily, to use with insurance preferred meter       Start Date: 10/4/2019 End Date: 10/3/2020    BUDESONIDE-FORMOTEROL 160-4.5 MCG (SYMBICORT) 160-4.5 MCG/ACTUATION HFAA    Inhale into the lungs.       Start  Date: 5/4/2016  End Date: --    CLONIDINE (CATAPRES) 0.1 MG TABLET    TAKE 1 TABLET(0.1 MG) BY MOUTH TWICE DAILY       Start Date: 6/10/2019 End Date: --    DICYCLOMINE (BENTYL) 20 MG TABLET    Take 1 tablet (20 mg total) by mouth 2 (two) times daily.       Start Date: 10/9/2019 End Date: 11/8/2019    DILTIAZEM (CARDIZEM CD) 240 MG 24 HR CAPSULE    Take 1 capsule (240 mg total) by mouth once daily.       Start Date: 4/8/2019  End Date: --    HYDROCHLOROTHIAZIDE (HYDRODIURIL) 25 MG TABLET    TAKE 1 TABLET(25 MG) BY MOUTH EVERY DAY       Start Date: 6/28/2019 End Date: --    IBUPROFEN (ADVIL,MOTRIN) 200 MG TABLET    Take 1 tablet (200 mg total) by mouth every 6 (six) hours as needed.       Start Date: 10/23/2017End Date: --    IPRATROPIUM (ATROVENT) 0.02 % NEBULIZER SOLUTION    Inhale into the lungs.       Start Date: 1/24/2017 End Date: --    LANCETS MISC    To check BG 2 times daily, to use with insurance preferred meter       Start Date: 10/4/2019 End Date: --    LISINOPRIL (PRINIVIL,ZESTRIL) 2.5 MG TABLET    TAKE 1 TABLET(2.5 MG) BY MOUTH EVERY DAY       Start Date: 6/10/2019 End Date: --    METFORMIN (GLUCOPHAGE) 500 MG TABLET    TAKE 1 TABLET(500 MG) BY MOUTH DAILY WITH BREAKFAST       Start Date: 6/28/2019 End Date: --    PREDNISONE (DELTASONE) 10 MG TABLET    TAKE 5 TABLETS BY MOUTH EVERY DAY FOR 5 DAYS       Start Date: 9/18/2019 End Date: --    RANITIDINE (ZANTAC) 300 MG TABLET    Take 1 tablet (300 mg total) by mouth once daily.       Start Date: 10/9/2019 End Date: 10/8/2020    SIMETHICONE (MYLICON) 125 MG CAP CAPSULE    Take 1 capsule (125 mg total) by mouth 4 (four) times daily as needed.       Start Date: 10/9/2019 End Date: --    THEOPHYLLINE (THEODUR) 300 MG 12 HR TABLET    TAKE 1 TABLET BY MOUTH TWICE A DAY       Start Date: 10/7/2019 End Date: --   Changed and/or Refilled Medications    Modified Medication Previous Medication    FUROSEMIDE (LASIX) 40 MG TABLET furosemide (LASIX) 40 MG tablet        TAKE ONE-HALF TABLET BY MOUTH EVERY DAY    Take 20 mg by mouth once daily.       Start Date: 11/7/2019 End Date: --    Start Date: 9/18/2019 End Date: 11/7/2019       Order(s) placed per written order guidelines:    Please advise.

## 2019-11-07 NOTE — TELEPHONE ENCOUNTER
----- Message from Penny Bryan sent at 11/7/2019  4:18 PM CST -----  Contact: Self  Type: RX Refill Request    Who Called: self  Have you contacted your pharmacy: no    Refill or New Rx: refill     RX Name and Strength albuterol (PROVENTIL) 2.5 mg /3 mL (0.083 %) nebulizer solution        Preferred Pharm[place] with phone number: [facility] [place] [facility] [phone] (Phone)  [phone] (Fax)        Local or Mail Order: local     Ordering Provider: page    Would the patient rather a call back or a response via [facility]? Call     Best Call Back Number: [phone]

## 2019-11-08 NOTE — TELEPHONE ENCOUNTER
I spoke to the pt and she is having trouble getting her Albuterol solution filled. I will contact  [facility] to see what the issue is. I called [facility] and they report they need CMN sent for the  Solution. They will fax the needed document for completion.

## 2019-11-12 NOTE — TELEPHONE ENCOUNTER
Call to MsDory Addis to let her know that Dr. Shaver stated her ultrasound results were the maximal diameter of 2.65 x 2.45 at the abdominal level. Explained that he stated to continue to control her BP and he will monitor it again in 5 years. She did ask what vessel this was. Explained it is aorta which is located in her chest and abdomen. She stated understanding.      [id number] Detailed

## 2019-12-09 NOTE — PROGRESS NOTES
CARDIOVASCULAR PROGRESS NOTE    REASON FOR CONSULT:   Johanna Mancini is a 69 y.o. female who presents for f/u htn, hlp, AF.    Ricardoc: Sivakumar  PCP: Page  Pulm: Oz  HISTORY OF PRESENT ILLNESS:   The patient returns for follow-up, accompanied by her daughter-in-law.  She was recently hospitalized with a COPD exacerbation in September at [facility].  Patient reports atrial fibrillation being diagnosed at that time, but has not followed up with me or any other cardiologist since then.  She continues to describe chronic dyspnea and lower extremity edema, although she seems to think both of these problems are worse of recent.  She is on home oxygen.  There otherwise has been no palpitations syncope.  She denies any melena, hematuria, or claudicant symptoms.  She does report chronic aspirin use for generalized body aches and pains as well as chronic headache.  I suggested she follow up with primary care physician to obtain alternative pain medication, as she will require anticoagulation for her atrial fibrillation and the combination of the anticoagulant and aspirin puts her at excess risk for bleeding.  At present, I plan a strategy of rate control and anticoagulation regards to her atrial fibrillation seeing as how is not entirely clear to me that her current symptoms are driven by the atrial fibrillation.    Of note, the patient tells me she is no longer taking lisinopril, hydrochlorothiazide, or clonidine and I will remove these medications from her medication list.    CARDIOVASCULAR HISTORY:   AF, persistent  Carotid stenosis R>L, followed by Dr. Shaver  [facility]    PAST MEDICAL HISTORY:     Past Medical History:   Diagnosis Date    Articular gout     Carotid artery occlusion     CHF (congestive heart failure)     COPD (chronic obstructive pulmonary disease)     Diabetes mellitus, type 2     Hyperlipidemia     Hypertension     Osteopetrosis     Osteoporosis     Sleep apnea        PAST  SURGICAL HISTORY:     Past Surgical History:   Procedure Laterality Date    CHOLECYSTECTOMY      HYSTERECTOMY      MINERVA equivalent       ALLERGIES AND MEDICATION:     Review of patient's allergies indicates:   Allergen Reactions    Tylenol [acetaminophen] Anaphylaxis    Levofloxacin Itching        Medication List           Accurate as of December 9, 2019  3:14 PM. If you have any questions, ask your nurse or doctor.               CONTINUE taking these medications    albuterol 2.5 mg /3 mL (0.083 %) nebulizer solution  Commonly known as:  PROVENTIL  Take 3 mLs (2.5 mg total) by nebulization every 6 (six) hours as needed for Wheezing.     allopurinol 100 MG tablet  Commonly known as:  ZYLOPRIM  TAKE 1 TABLET(100 MG) BY MOUTH EVERY DAY     aspirin 81 MG EC tablet  Commonly known as:  ECOTRIN  Take 1 tablet (81 mg total) by mouth once daily.     atorvastatin 80 MG tablet  Commonly known as:  LIPITOR  Take 1 tablet (80 mg total) by mouth once daily.     blood sugar diagnostic Strp  To check BG 2 times daily, to use with insurance preferred meter     blood-glucose meter kit  To check BG 2 times daily, to use with insurance preferred meter     cloNIDine 0.1 MG tablet  Commonly known as:  CATAPRES  TAKE 1 TABLET(0.1 MG) BY MOUTH TWICE DAILY     diltiaZEM 240 MG 24 hr capsule  Commonly known as:  Cardizem CD  Take 1 capsule (240 mg total) by mouth once daily.     furosemide 40 MG tablet  Commonly known as:  LASIX  TAKE ONE-HALF TABLET BY MOUTH EVERY DAY     hydroCHLOROthiazide 25 MG tablet  Commonly known as:  HYDRODIURIL  TAKE 1 TABLET(25 MG) BY MOUTH EVERY DAY     ibuprofen 200 MG tablet  Commonly known as:  ADVIL,MOTRIN  Take 1 tablet (200 mg total) by mouth every 6 (six) hours as needed.     ipratropium 0.02 % nebulizer solution  Commonly known as:  ATROVENT     lancets Mis  To check BG 2 times daily, to use with insurance preferred meter     lisinopril 2.5 MG tablet  Commonly known as:  PRINIVIL,ZESTRIL  TAKE 1  TABLET(2.5 MG) BY MOUTH EVERY DAY     metFORMIN 500 MG tablet  Commonly known as:  GLUCOPHAGE  TAKE 1 TABLET(500 MG) BY MOUTH DAILY WITH BREAKFAST     predniSONE 10 MG tablet  Commonly known as:  DELTASONE     ranitidine 300 MG tablet  Commonly known as:  ZANTAC  Take 1 tablet (300 mg total) by mouth once daily.     simethicone 125 mg Cap capsule  Commonly known as:  MYLICON  Take 1 capsule (125 mg total) by mouth 4 (four) times daily as needed.     Symbicort 160-4.5 mcg/actuation Hfaa  Generic drug:  budesonide-formoterol 160-4.5 mcg     theophylline 300 MG 12 hr tablet  Commonly known as:  THEODUR  TAKE 1 TABLET BY MOUTH TWICE A DAY            SOCIAL HISTORY:     Social History     Socioeconomic History    Marital status:      Spouse name: Not on file    Number of children: Not on file    Years of education: Not on file    Highest education level: Not on file   Occupational History    Not on file   Social Needs    Financial resource strain: Not on file    Food insecurity:     Worry: Not on file     Inability: Not on file    Transportation needs:     Medical: Not on file     Non-medical: Not on file   Tobacco Use    Smoking status: Former Smoker    Smokeless tobacco: Never Used   Substance and Sexual Activity    Alcohol use: No    Drug use: No    Sexual activity: Not on file   Lifestyle    Physical activity:     Days per week: Not on file     Minutes per session: Not on file    Stress: Not on file   Relationships    Social connections:     Talks on phone: Not on file     Gets together: Not on file     Attends [employer] service: Not on file     Active member of club or organization: Not on file     Attends meetings of clubs or organizations: Not on file     Relationship status: Not on file   Other Topics Concern    Not on file   Social History Narrative    .  Three children. Former smoker, quit 15 yr ago.  Retired [employer], [employer] and kitchen work.        FAMILY HISTORY:     Family History  "  Problem Relation Age of Onset    Heart disease Mother     Cancer Father     Cancer Sister         breast       REVIEW OF SYSTEMS:   Review of Systems   Constitutional: Negative for chills, diaphoresis and fever.   HENT: Negative for nosebleeds.    Eyes: Negative for blurred vision, double vision and photophobia.   Respiratory: Positive for shortness of breath. Negative for hemoptysis and wheezing.    Cardiovascular: Positive for leg swelling. Negative for chest pain, palpitations, orthopnea, claudication and PND.   Gastrointestinal: Negative for abdominal pain, blood in stool, heartburn, melena, nausea and vomiting.   Genitourinary: Negative for flank pain and hematuria.   Musculoskeletal: Negative for falls, myalgias and neck pain.   Skin: Negative for rash.   Neurological: Negative for dizziness, seizures, loss of consciousness, weakness and headaches.   Endo/Heme/Allergies: Negative for polydipsia. Does not bruise/bleed easily.   Psychiatric/Behavioral: Negative for depression and memory loss. The patient is not nervous/anxious.        PHYSICAL EXAM:     Vitals:    12/09/19 1454   BP: 132/70   Pulse: 98   Resp: 15    Body mass index is 47.02 kg/m².  Weight: 116.6 kg (257 lb 0.9 oz)   Height: 5' 2" (157.5 cm)     Physical Exam   Constitutional: She is oriented to person, place, and time. She appears well-developed and well-nourished. She is cooperative.  Non-toxic appearance. No distress.   HENT:   Head: Normocephalic and atraumatic.   Eyes: Pupils are equal, round, and reactive to light. Conjunctivae and EOM are normal. No scleral icterus.   Neck: Trachea normal and normal range of motion. Neck supple. Normal carotid pulses and no JVD present. Carotid bruit is not present. No neck rigidity. No tracheal deviation and no edema present. No thyromegaly present.   Cardiovascular: Normal rate, S1 normal and S2 normal. An irregularly irregular rhythm present. PMI is not displaced. Exam reveals distant heart sounds. " Exam reveals no gallop and no friction rub.   No murmur heard.  Pulses:       Carotid pulses are 2+ on the right side, and 2+ on the left side.  Pulmonary/Chest: Effort normal and breath sounds normal. No stridor. No respiratory distress. She has no wheezes. She has no rales. She exhibits no tenderness.   Abdominal: Soft. She exhibits no distension. There is no hepatosplenomegaly.   obese   Musculoskeletal: Normal range of motion. She exhibits edema. She exhibits no tenderness.   Feet:   Right Foot:   Skin Integrity: Negative for ulcer.   Left Foot:   Skin Integrity: Negative for ulcer.   Neurological: She is alert and oriented to person, place, and time. No cranial nerve deficit.   Skin: Skin is warm and dry. No rash noted. No erythema.   Psychiatric: She has a normal mood and affect. Her speech is normal and behavior is normal.   Vitals reviewed.      DATA:   EKG: (personally reviewed tracing(s))  12/9/19 AF 98, low volt.  AF new vs 4/8/19    Laboratory:  CBC:  Recent Labs   Lab 01/25/18 1810   WBC 5.85   Hemoglobin 13.7   Hematocrit 43.3   Platelets 174       CHEMISTRIES:  Recent Labs   Lab 10/16/17  1125 01/25/18  1810 10/04/19  0949   Glucose 111 H 103 115 H   Sodium 142 141 140   Potassium 4.3 3.8 3.5   BUN, Bld 15 18 16   Creatinine 0.65 0.8 0.7   eGFR if   --  >60 >60   eGFR if non   --  >60 >60   Calcium 9.3 9.9 9.5       CARDIAC BIOMARKERS:  Recent Labs   Lab 01/25/18  1810   Troponin I <0.006       COAGS:        LIPIDS/LFTS:  Recent Labs   Lab 01/25/18  1810 04/22/19  1121 10/04/19  0949   Cholesterol  --  130  --    Triglycerides  --  109  --    HDL  --  52  --    LDL Cholesterol  --  56.2 L  --    Non-HDL Cholesterol  --  78  --    AST 19 15 17   ALT 18 13 25       Cardiovascular Testing:  Aortic US 3/25/19  Abdominal aortic ultrasound shows maximal aortic diameter of 2.65 x 2.45 at the infrarenal level.  There is no iliac artery aneurysm.      Carotid US 3/14/19  (similar to report 9/27/18)  1. Right carotid ultrasound shows 80-99% internal carotid artery stenosis.  Antegrade vertebral artery flow.  2. Left carotid ultrasound shows  60-79% internal carotid artery stenosis.  Antegrade vertebral artery flow.    Tracy MPI 10/15/18   · The perfusion scan is free of evidence from myocardial ischemia or injury.  · Post Stress Ejection Fraction is 73 %    Echo 10/15/18  · Left ventricle ejection fraction is normal at 60%  · Left ventricle shows concentric remodeling.  · Normal LV diastolic function.  · RV systolic function is normal.  · Mild aortic valve stenosis.  · KOLBY is 2.72 cm2; peak velocity is 1.62 m/s; mean gradient is 6.36 mmHg.    LE venous US/reflux 6/20/18  1. There is hemodynamically significant venous reflux (reflux lasting greater than 500 ms) within the bilateral greater saphenous vein.  2. There is no evidence of bilateral lower extremity deep venous thrombosis.    ASSESSMENT:   # AF, newly noted (?ongoing from recent hospitalization at [facility] 9/2019)  # PARIKH, chronic, normal echo/MPI 10/2018  # HTN, controlled  # O2 dep COPD, follows with Dr. Clinton  # HLP, on atorva 40mg  # DM  # BMI 47, up 2 units vs last OV  # CVI/LE edema, chronic  # carotid stenosis, follows with Dr. Shaver    PLAN:   Cont med rx  Stop ASA, pt directed to PCP to discuss alternative pain med in setting of APAP allergy)  Start Xarelto 20mg qd  Inc lasix 40mg qd  Inc cardizem 480mg qd  Check echo/holter  RTC 1 month to discuss ATIYA/DCCV if persistent sxs (not clear current sxs of SOB/LE edema aren't r/t COPD/cor pulm)    Desean Monroe MD, FACC

## 2019-12-12 NOTE — TELEPHONE ENCOUNTER
Patient states she was taking the ASPRIN at least 2 times a day. Patient states she used it for her arthritis, headaches, or any other pain she was having, along with an ALEVE every now and then. Please advise.

## 2019-12-12 NOTE — TELEPHONE ENCOUNTER
Patient states that Dr. Monroe started her on XARELTO and told her to stop the Asprin. Patient is asking what she can take in place of the Asprin because she's allergic to acetaminophen and she needs something for her arthritis pain. Please advise.

## 2019-12-12 NOTE — TELEPHONE ENCOUNTER
----- Message from Lynda Sahni sent at 12/12/2019 10:39 AM CST -----  Contact: self   Type: Patient Call Back    What is the request in detail: pt requesting a nurse regarding health.   Can the clinic reply by [facility]? No    Would the patient rather a call back or a response via [facility]? Call back     Best call back number: [phone]

## 2019-12-24 NOTE — TELEPHONE ENCOUNTER
----- Message from Jennifer Mancini sent at 12/23/2019  8:50 AM CST -----  Contact: [facility]  [phone] ext [phone]  .Type: Patient Call Back    Who called: [facility]     What is the request in detail: Requesting to know if the pt is taking insulin     Can the clinic reply by [facility]? Call back   Would the patient rather a call back or a response via [facility]? Callback     Best call back number: [phone] ext [phone]

## 2019-12-27 NOTE — ED TRIAGE NOTES
Pt reports sitting at home giving herself her nightly neb tx when she had sudden onset sharp left shoulder pain that radiates to her neck, back & down her arm around 2230. Pt denies any hx of injury to the area & denies feeling this pain before. Pt also is always on 4L o2 NC at home with hx of copd, afib & chf and denies any increased sob or cp at this time.

## 2019-12-27 NOTE — DISCHARGE INSTRUCTIONS
You were seen in the emergency department for shoulder pain. Your labs and workup are reassuring.  We are not sure the cause of your pain is but we do not think it is anything serious at this time.  We think you're safe to go home.  Please follow-up with her primary care provider.  Please return for any new or worsening pain, fever, inability to move your shoulder, or you become concerned in any other way.

## 2019-12-27 NOTE — ED PROVIDER NOTES
Encounter Date: 12/26/2019    SCRIBE #1 NOTE: I, Kayla Rodriguez, am scribing for, and in the presence of,  Mc Francois MD. I have scribed the following portions of the note - Other sections scribed: HPI, ROS, PE.       History     Chief Complaint   Patient presents with    Shoulder Pain     pt c/o (L) sided shoulder pain that started 1.5 hrs ago; denies injury or trauma; hurts worse with movement; pt is O2 dependent and on 4L via NC at all times     CC: Shoulder Pain    HPI: The patient is a 69 y.o female who presents to the ED complaining of persisting, left shoulder pain that began about 2 hrs PTA. She states that pain began suddenly. Pain radiates down her LUE and to her left upper back. Pain is exacerbated with movement of the LUE. Patient admits to taking aleve without relief. No Hx of similar symptoms. No recent trauma or injury to the area of concern. No recent heavy lifting or pushing heavy objects. She denies CP, or new SOB and nausea. She denies diaphoresis. Patient states that leg swelling to bilateral lower extremities have been chronic since D/C following hospitalization a few months ago. Patient takes xarelto, atorvastatin, and lasix. Patient is allergic to tylenol and levofloxacin. SHx as a former smoker, but no alcohol consumption or drug use.     The history is provided by the patient. No  was used.     Review of patient's allergies indicates:   Allergen Reactions    Tylenol [acetaminophen] Anaphylaxis    Hydrocodone-acetaminophen Itching    Levofloxacin Itching     Past Medical History:   Diagnosis Date    Articular gout     Atrial fibrillation     Carotid artery occlusion     CHF (congestive heart failure)     COPD (chronic obstructive pulmonary disease)     Diabetes mellitus, type 2     Hyperlipidemia     Hypertension     Osteopetrosis     Osteoporosis     Sleep apnea      Past Surgical History:   Procedure Laterality Date    CHOLECYSTECTOMY       HYSTERECTOMY      MINERVA equivalent     Family History   Problem Relation Age of Onset    Heart disease Mother     Cancer Father     Cancer Sister         breast     Social History     Tobacco Use    Smoking status: Former Smoker    Smokeless tobacco: Never Used   Substance Use Topics    Alcohol use: No    Drug use: No     Review of Systems   Constitutional: Negative for diaphoresis and fever.   HENT: Negative for congestion.    Respiratory: Negative for cough and shortness of breath.    Cardiovascular: Positive for leg swelling (Bilateral ). Negative for chest pain.   Gastrointestinal: Negative for nausea.   Genitourinary: Negative for dysuria.   Musculoskeletal: Positive for arthralgias (Left shoulder), back pain (Secondary to shoulder pain) and myalgias (LUE; secondary to shoulder pain).   Skin: Negative for rash and wound.   Neurological: Negative for headaches.   Psychiatric/Behavioral: Negative for confusion.       Physical Exam     Initial Vitals [12/26/19 2351]   BP Pulse Resp Temp SpO2   (!) 154/72 96 20 97.6 °F (36.4 °C) 98 %      MAP       --         Physical Exam    Nursing note and vitals reviewed.  Constitutional: She appears well-developed and well-nourished. She is not diaphoretic. No distress.   HENT:   Head: Normocephalic and atraumatic.   Nose: Nose normal.   Eyes: Conjunctivae and EOM are normal. Pupils are equal, round, and reactive to light. Right eye exhibits no discharge. Left eye exhibits no discharge. No scleral icterus.   Neck: Normal range of motion. Neck supple.   Cardiovascular: Normal rate, regular rhythm, normal heart sounds and intact distal pulses. Exam reveals no gallop and no friction rub.    No murmur heard.  Pulmonary/Chest: Breath sounds normal. No stridor. No respiratory distress. She has no wheezes. She has no rhonchi. She has no rales.   Abdominal: Soft. Normal appearance and bowel sounds are normal. She exhibits no distension. There is no tenderness. There is no rebound  and no guarding.   Musculoskeletal: Normal range of motion. She exhibits edema and tenderness.   Patient has tenderness to palpation of the left shoulder. Reduced ROM of the left shoulder due to pain. Patient has pitting edema to bilateral lower extremities.   Neurological: She is alert and oriented to person, place, and time. No cranial nerve deficit.   Skin: Skin is warm and dry. No rash noted.   Psychiatric: She has a normal mood and affect. Her behavior is normal. Judgment and thought content normal.         ED Course   Procedures  Labs Reviewed   CBC W/ AUTO DIFFERENTIAL - Abnormal; Notable for the following components:       Result Value    Mean Corpuscular Hemoglobin 26.6 (*)     Mean Corpuscular Hemoglobin Conc 29.0 (*)     RDW 16.2 (*)     Mono # 1.1 (*)     Gran% 74.1 (*)     Lymph% 12.2 (*)     All other components within normal limits   COMPREHENSIVE METABOLIC PANEL - Abnormal; Notable for the following components:    CO2 36 (*)     Glucose 116 (*)     Anion Gap 6 (*)     All other components within normal limits   B-TYPE NATRIURETIC PEPTIDE - Abnormal; Notable for the following components:     (*)     All other components within normal limits   TROPONIN I   SEDIMENTATION RATE   C-REACTIVE PROTEIN   TROPONIN I        ECG Results          EKG 12-lead (Final result)  Result time 12/27/19 18:17:09    Final result by Interface, Lab [facility] (12/27/19 18:17:09)                 Narrative:    Test Reason : R07.9,    Vent. Rate : 088 BPM     Atrial Rate : 208 BPM     P-R Int : 000 ms          QRS Dur : 088 ms      QT Int : 366 ms       P-R-T Axes : 000 074 126 degrees     QTc Int : 442 ms    Atrial fibrillation  Nonspecific ST abnormality  Abnormal ECG  When compared with ECG of 09-DEC-2019 15:13,  Significant changes have occurred  Confirmed by Kenrick Crum MD ([id number]) on 12/27/2019 6:16:56 PM    Referred By: [email or web address]   SELF           Confirmed By:Kenrick Crum MD                            Imaging  Results          CT Should With Contrast Left (Final result)  Result time 12/27/19 02:58:34    Final result by Chapin Mendez MD (12/27/19 02:58:34)                 Impression:      No evidence of acute fracture or dislocation of the left shoulder.  Probable small glenohumeral effusion as well as small volume of fluid within the subacromial/subdeltoid bursa, noting assessment is limited by CT technique.  If there is concern for septic arthritis, joint aspiration could be performed.    Incidentally noted left-sided neural foraminal narrowing at the levels of C3-C4 and C5-C6.      Electronically signed by: Chapin Mendez MD  Date:    12/27/2019  Time:    02:58             Narrative:    EXAMINATION:  CT SHOULDER WITH CONTRAST LEFT    CLINICAL HISTORY:  Aseptic necrosis, shoulder;left shoulder pain, infection possible;    TECHNIQUE:  1.25 mm axial images were acquired of the left shoulder following the administration of 75 cc omni 350 IV contrast.  Sagittal and coronal reformatted images reviewed.    COMPARISON:  Left shoulder radiograph series 12/27/2019    FINDINGS:  The visualized osseous structures appear intact without evidence of acute fracture.  The left humeral head appears appropriately seated within the glenoid.  There is suggestion of a small glenohumeral joint effusion.  There is no evidence to suggest osteonecrosis of the humeral head.  Evaluation of the rotator cuff is limited by CT technique.  Rotator cuff musculature demonstrates relatively adequate bulk without evidence of significant fatty atrophy.  There is a probable small volume of fluid within the subacromial/subdeltoid bursa.  There is mild degenerative osteoarthrosis of the acromioclavicular joint.    Limited views of the left hemithorax demonstrate no confluent airspace consolidation.  No left-sided rib fractures appreciated.  Limited views of the cervical spine demonstrate left-sided neural foraminal narrowing at the levels of C3-C4 and  C5-C6.  There are atherosclerotic calcifications of the carotid vasculature.  Subcutaneous soft tissues demonstrate no evidence of focal drainable fluid collection to suggest abscess or focal soft tissue induration.  No evidence of soft tissue gas.                               X-Ray Shoulder 2 or More Views Left (Final result)  Result time 12/27/19 01:53:18    Final result by Debi Ma MD (12/27/19 01:53:18)                 Impression:      As above described.      Electronically signed by: Debi Ma  Date:    12/27/2019  Time:    01:53             Narrative:    EXAMINATION:  TWO OR MORE VIEWS OF THE LEFT SHOULDER    CLINICAL HISTORY:  shoulder pain;    TECHNIQUE:  Two or more views of the left shoulder    COMPARISON:  None.    FINDINGS:  Two or more views of the left shoulder demonstrate no acute fracture or dislocation.  The left AC joint is mildly widened.  It is mildly asymmetrically widened on previous chest radiographs.                               X-Ray Chest AP Portable (Final result)  Result time 12/27/19 00:52:21    Final result by Debi Ma MD (12/27/19 00:52:21)                 Impression:      No acute intrathoracic abnormality detected.      Electronically signed by: Debi Ma  Date:    12/27/2019  Time:    00:52             Narrative:    EXAMINATION:  AP PORTABLE CHEST    CLINICAL HISTORY:  Chest Pain;    TECHNIQUE:  AP portable chest radiograph was submitted.    COMPARISON:  01/25/2018    FINDINGS:  AP portable chest radiograph demonstrates a cardiac silhouette within normal limits.  Vascular calcification is seen in the aortic knob.  There is no focal consolidation, pneumothorax, or pleural effusion. There is mild elevation the right hemidiaphragm.                                 Medical Decision Making:   Initial Assessment:   69-year-old female with a history of CHF, COPD presenting with acute onset left shoulder pain. Patient states she was at rest any breathing  treatment when she had sudden onset of pain.  Denies prior motion, injury, trauma. On exam, patient mildly uncomfortable.  Tenderness to palpation of her left shoulder.  Limited range of motion. Pitting edema in chronic swelling and edema of her lower extremities. Patient states it has been this way for about 3 months.  Denies painful respirations.  Vitals relatively normal, mild hypertension.  Unclear cause of pain, dislocation, fracture, gout, septic joint possibilities.  We will get x-rays, labs, and reassess.  ED Management:  Labs including sed rate and CRP negative. ROM improved, able to move passively without much pain, believe septic joint unlikely. CT without definite evidence of abscess. Reassured by no fever, white count, crp, sed rate. Sudden onset, unlikely to be rapid infectious process. Likely MSK strain. No dislocation or fracture on CT. Okay for DC home. Given Rx for ibuprofen, short course of low dose oxycodone. Given return precautions.             Scribe Attestation:   Scribe #1: I performed the above scribed service and the documentation accurately describes the services I performed. I attest to the accuracy of the note.           I, Mc Francois, personally performed the services described in this documentation. All medical record entries made by the scribe were at my direction and in my presence.  I have reviewed the chart and agree that the record reflects my personal performance and is accurate and complete.                  Clinical Impression:       ICD-10-CM ICD-9-CM   1. Acute pain of left shoulder M25.512 719.41   2. Chest pain R07.9 786.50         Disposition:   Disposition: Discharged  Condition: Stable                     Mc Francois MD  12/27/19 2028

## 2020-01-01 ENCOUNTER — TELEPHONE (OUTPATIENT)
Dept: FAMILY MEDICINE | Facility: CLINIC | Age: 70
End: 2020-01-01

## 2020-01-01 ENCOUNTER — DOCUMENT SCAN (OUTPATIENT)
Dept: HOME HEALTH SERVICES | Facility: HOSPITAL | Age: 70
End: 2020-01-01
Payer: MEDICARE

## 2020-01-01 ENCOUNTER — TELEPHONE (OUTPATIENT)
Dept: HOME HEALTH SERVICES | Facility: HOSPITAL | Age: 70
End: 2020-01-01

## 2020-01-01 ENCOUNTER — TELEPHONE (OUTPATIENT)
Dept: CARDIOLOGY | Facility: CLINIC | Age: 70
End: 2020-01-01

## 2020-01-01 ENCOUNTER — OFFICE VISIT (OUTPATIENT)
Dept: CARDIOLOGY | Facility: CLINIC | Age: 70
End: 2020-01-01
Payer: MEDICARE

## 2020-01-01 ENCOUNTER — EXTERNAL HOME HEALTH (OUTPATIENT)
Dept: HOME HEALTH SERVICES | Facility: HOSPITAL | Age: 70
End: 2020-01-01
Payer: MEDICARE

## 2020-01-01 ENCOUNTER — HOSPITAL ENCOUNTER (INPATIENT)
Facility: HOSPITAL | Age: 70
LOS: 6 days | DRG: 291 | End: 2020-07-01
Attending: EMERGENCY MEDICINE | Admitting: INTERNAL MEDICINE
Payer: MEDICARE

## 2020-01-01 ENCOUNTER — PATIENT OUTREACH (OUTPATIENT)
Dept: ADMINISTRATIVE | Facility: OTHER | Age: 70
End: 2020-01-01

## 2020-01-01 VITALS
DIASTOLIC BLOOD PRESSURE: 84 MMHG | OXYGEN SATURATION: 76 % | HEIGHT: 62 IN | SYSTOLIC BLOOD PRESSURE: 181 MMHG | RESPIRATION RATE: 15 BRPM | WEIGHT: 251.13 LBS | HEART RATE: 88 BPM | BODY MASS INDEX: 46.21 KG/M2

## 2020-01-01 VITALS
HEIGHT: 66 IN | DIASTOLIC BLOOD PRESSURE: 62 MMHG | OXYGEN SATURATION: 92 % | HEART RATE: 74 BPM | TEMPERATURE: 98 F | RESPIRATION RATE: 20 BRPM | BODY MASS INDEX: 45.7 KG/M2 | SYSTOLIC BLOOD PRESSURE: 129 MMHG | WEIGHT: 284.38 LBS

## 2020-01-01 DIAGNOSIS — E11.9 TYPE 2 DIABETES MELLITUS WITHOUT COMPLICATION, UNSPECIFIED WHETHER LONG TERM INSULIN USE: ICD-10-CM

## 2020-01-01 DIAGNOSIS — I10 HYPERTENSION, ESSENTIAL: ICD-10-CM

## 2020-01-01 DIAGNOSIS — E11.9 TYPE 2 DIABETES MELLITUS WITHOUT COMPLICATION: ICD-10-CM

## 2020-01-01 DIAGNOSIS — E11.9 TYPE 2 DIABETES MELLITUS WITHOUT COMPLICATION, WITHOUT LONG-TERM CURRENT USE OF INSULIN: ICD-10-CM

## 2020-01-01 DIAGNOSIS — J96.90 RESPIRATORY FAILURE, UNSPECIFIED CHRONICITY, UNSPECIFIED WHETHER WITH HYPOXIA OR HYPERCAPNIA: Primary | ICD-10-CM

## 2020-01-01 DIAGNOSIS — R07.9 CHEST PAIN: ICD-10-CM

## 2020-01-01 DIAGNOSIS — E78.2 MIXED HYPERLIPIDEMIA: ICD-10-CM

## 2020-01-01 DIAGNOSIS — I50.9 CONGESTIVE HEART FAILURE, UNSPECIFIED HF CHRONICITY, UNSPECIFIED HEART FAILURE TYPE: ICD-10-CM

## 2020-01-01 DIAGNOSIS — E66.01 MORBID OBESITY, UNSPECIFIED OBESITY TYPE: ICD-10-CM

## 2020-01-01 DIAGNOSIS — J43.1 PANLOBULAR EMPHYSEMA: ICD-10-CM

## 2020-01-01 DIAGNOSIS — J98.01 ACUTE BRONCHOSPASM: ICD-10-CM

## 2020-01-01 DIAGNOSIS — R60.1 ANASARCA: ICD-10-CM

## 2020-01-01 DIAGNOSIS — I48.19 OTHER PERSISTENT ATRIAL FIBRILLATION: Primary | ICD-10-CM

## 2020-01-01 DIAGNOSIS — I50.9 HEART FAILURE: ICD-10-CM

## 2020-01-01 DIAGNOSIS — M1A.09X0 IDIOPATHIC CHRONIC GOUT OF MULTIPLE SITES WITHOUT TOPHUS: ICD-10-CM

## 2020-01-01 LAB
ALBUMIN SERPL BCP-MCNC: 2.6 G/DL (ref 3.5–5.2)
ALBUMIN SERPL BCP-MCNC: 2.7 G/DL (ref 3.5–5.2)
ALBUMIN SERPL BCP-MCNC: 2.9 G/DL (ref 3.5–5.2)
ALBUMIN SERPL BCP-MCNC: 3.4 G/DL (ref 3.5–5.2)
ALLENS TEST: ABNORMAL
ALP SERPL-CCNC: 100 U/L (ref 55–135)
ALP SERPL-CCNC: 100 U/L (ref 55–135)
ALP SERPL-CCNC: 104 U/L (ref 55–135)
ALP SERPL-CCNC: 115 U/L (ref 55–135)
ALP SERPL-CCNC: 128 U/L (ref 55–135)
ALP SERPL-CCNC: 142 U/L (ref 55–135)
ALT SERPL W/O P-5'-P-CCNC: 18 U/L (ref 10–44)
ALT SERPL W/O P-5'-P-CCNC: 19 U/L (ref 10–44)
ALT SERPL W/O P-5'-P-CCNC: 20 U/L (ref 10–44)
ALT SERPL W/O P-5'-P-CCNC: 20 U/L (ref 10–44)
ALT SERPL W/O P-5'-P-CCNC: 28 U/L (ref 10–44)
ALT SERPL W/O P-5'-P-CCNC: 33 U/L (ref 10–44)
ANION GAP SERPL CALC-SCNC: 11 MMOL/L (ref 8–16)
ANION GAP SERPL CALC-SCNC: 4 MMOL/L (ref 8–16)
ANION GAP SERPL CALC-SCNC: 5 MMOL/L (ref 8–16)
ANION GAP SERPL CALC-SCNC: 6 MMOL/L (ref 8–16)
ANION GAP SERPL CALC-SCNC: 6 MMOL/L (ref 8–16)
ANION GAP SERPL CALC-SCNC: 7 MMOL/L (ref 8–16)
AORTIC ROOT ANNULUS: 2.98 CM
AORTIC VALVE CUSP SEPERATION: 2.44 CM
APTT BLDCRRT: 24.5 SEC (ref 21–32)
ASCENDING AORTA: 2.71 CM
AST SERPL-CCNC: 18 U/L (ref 10–40)
AST SERPL-CCNC: 21 U/L (ref 10–40)
AST SERPL-CCNC: 25 U/L (ref 10–40)
AST SERPL-CCNC: 26 U/L (ref 10–40)
AST SERPL-CCNC: 27 U/L (ref 10–40)
AST SERPL-CCNC: 35 U/L (ref 10–40)
AV INDEX (PROSTH): 0.71
AV MEAN GRADIENT: 10 MMHG
AV PEAK GRADIENT: 15 MMHG
AV VALVE AREA: 2.19 CM2
AV VELOCITY RATIO: 0.7
BACTERIA #/AREA URNS HPF: ABNORMAL /HPF
BASOPHILS # BLD AUTO: 0.04 K/UL (ref 0–0.2)
BASOPHILS NFR BLD: 0.6 % (ref 0–1.9)
BILIRUB SERPL-MCNC: 0.4 MG/DL (ref 0.1–1)
BILIRUB SERPL-MCNC: 0.6 MG/DL (ref 0.1–1)
BILIRUB SERPL-MCNC: 0.6 MG/DL (ref 0.1–1)
BILIRUB UR QL STRIP: NEGATIVE
BNP SERPL-MCNC: 383 PG/ML (ref 0–99)
BSA FOR ECHO PROCEDURE: 2.49 M2
BUN SERPL-MCNC: 16 MG/DL (ref 8–23)
BUN SERPL-MCNC: 20 MG/DL (ref 8–23)
BUN SERPL-MCNC: 21 MG/DL (ref 8–23)
BUN SERPL-MCNC: 24 MG/DL (ref 8–23)
BUN SERPL-MCNC: 30 MG/DL (ref 8–23)
BUN SERPL-MCNC: 31 MG/DL (ref 8–23)
CALCIUM SERPL-MCNC: 8.6 MG/DL (ref 8.7–10.5)
CALCIUM SERPL-MCNC: 8.6 MG/DL (ref 8.7–10.5)
CALCIUM SERPL-MCNC: 8.7 MG/DL (ref 8.7–10.5)
CALCIUM SERPL-MCNC: 8.9 MG/DL (ref 8.7–10.5)
CALCIUM SERPL-MCNC: 8.9 MG/DL (ref 8.7–10.5)
CALCIUM SERPL-MCNC: 9.2 MG/DL (ref 8.7–10.5)
CHLORIDE SERPL-SCNC: 89 MMOL/L (ref 95–110)
CHLORIDE SERPL-SCNC: 90 MMOL/L (ref 95–110)
CHLORIDE SERPL-SCNC: 91 MMOL/L (ref 95–110)
CHLORIDE SERPL-SCNC: 92 MMOL/L (ref 95–110)
CLARITY UR: CLEAR
CO2 SERPL-SCNC: 39 MMOL/L (ref 23–29)
CO2 SERPL-SCNC: 42 MMOL/L (ref 23–29)
CO2 SERPL-SCNC: 43 MMOL/L (ref 23–29)
CO2 SERPL-SCNC: 43 MMOL/L (ref 23–29)
CO2 SERPL-SCNC: 47 MMOL/L (ref 23–29)
CO2 SERPL-SCNC: 47 MMOL/L (ref 23–29)
COLOR UR: YELLOW
CREAT SERPL-MCNC: 0.7 MG/DL (ref 0.5–1.4)
CREAT SERPL-MCNC: 0.8 MG/DL (ref 0.5–1.4)
CREAT UR-MCNC: 89.8 MG/DL (ref 15–325)
CV ECHO LV RWT: 0.4 CM
D DIMER PPP IA.FEU-MCNC: 9.74 MG/L FEU
DELSYS: ABNORMAL
DIFFERENTIAL METHOD: ABNORMAL
DOP CALC AO PEAK VEL: 1.96 M/S
DOP CALC AO VTI: 41.75 CM
DOP CALC LVOT AREA: 3.1 CM2
DOP CALC LVOT DIAMETER: 1.98 CM
DOP CALC LVOT PEAK VEL: 1.37 M/S
DOP CALC LVOT STROKE VOLUME: 91.49 CM3
DOP CALCLVOT PEAK VEL VTI: 29.73 CM
ECHO LV POSTERIOR WALL: 1.06 CM (ref 0.6–1.1)
EOSINOPHIL # BLD AUTO: 0 K/UL (ref 0–0.5)
EOSINOPHIL NFR BLD: 0.3 % (ref 0–8)
EP: 5
ERYTHROCYTE [DISTWIDTH] IN BLOOD BY AUTOMATED COUNT: 19.4 % (ref 11.5–14.5)
ERYTHROCYTE [SEDIMENTATION RATE] IN BLOOD BY WESTERGREN METHOD: 14 MM/H
ERYTHROCYTE [SEDIMENTATION RATE] IN BLOOD BY WESTERGREN METHOD: 14 MM/H
EST. GFR  (AFRICAN AMERICAN): >60 ML/MIN/1.73 M^2
EST. GFR  (NON AFRICAN AMERICAN): >60 ML/MIN/1.73 M^2
ESTIMATED AVG GLUCOSE: 128 MG/DL (ref 68–131)
FIO2: 30
FIO2: 40
FIO2: 50
FRACTIONAL SHORTENING: 30 % (ref 28–44)
GLUCOSE SERPL-MCNC: 105 MG/DL (ref 70–110)
GLUCOSE SERPL-MCNC: 113 MG/DL (ref 70–110)
GLUCOSE SERPL-MCNC: 118 MG/DL (ref 70–110)
GLUCOSE SERPL-MCNC: 123 MG/DL (ref 70–110)
GLUCOSE SERPL-MCNC: 125 MG/DL (ref 70–110)
GLUCOSE SERPL-MCNC: 184 MG/DL (ref 70–110)
GLUCOSE UR QL STRIP: NEGATIVE
HBA1C MFR BLD HPLC: 6.1 % (ref 4–5.6)
HCO3 UR-SCNC: 47.9 MMOL/L (ref 24–28)
HCO3 UR-SCNC: 48.2 MMOL/L (ref 24–28)
HCO3 UR-SCNC: 48.3 MMOL/L (ref 24–28)
HCT VFR BLD AUTO: 38.6 % (ref 37–48.5)
HGB BLD-MCNC: 10.5 G/DL (ref 12–16)
HGB UR QL STRIP: NEGATIVE
HYALINE CASTS #/AREA URNS LPF: >10 /LPF
IMM GRANULOCYTES # BLD AUTO: 0.01 K/UL (ref 0–0.04)
IMM GRANULOCYTES NFR BLD AUTO: 0.1 % (ref 0–0.5)
INR PPP: 1.1 (ref 0.8–1.2)
INTERVENTRICULAR SEPTUM: 1.19 CM (ref 0.6–1.1)
IP: 15
IP: 18
IP: 18
IVRT: 62.8 MSEC
KETONES UR QL STRIP: NEGATIVE
LA MAJOR: 6.32 CM
LA MINOR: 6.3 CM
LA WIDTH: 4.83 CM
LEFT ATRIUM SIZE: 4.18 CM
LEFT ATRIUM VOLUME INDEX: 46 ML/M2
LEFT ATRIUM VOLUME: 108.29 CM3
LEFT INTERNAL DIMENSION IN SYSTOLE: 3.66 CM (ref 2.1–4)
LEFT VENTRICLE DIASTOLIC VOLUME INDEX: 56.19 ML/M2
LEFT VENTRICLE DIASTOLIC VOLUME: 132.18 ML
LEFT VENTRICLE MASS INDEX: 98 G/M2
LEFT VENTRICLE SYSTOLIC VOLUME INDEX: 24 ML/M2
LEFT VENTRICLE SYSTOLIC VOLUME: 56.56 ML
LEFT VENTRICULAR INTERNAL DIMENSION IN DIASTOLE: 5.25 CM (ref 3.5–6)
LEFT VENTRICULAR MASS: 231.21 G
LEUKOCYTE ESTERASE UR QL STRIP: NEGATIVE
LYMPHOCYTES # BLD AUTO: 0.4 K/UL (ref 1–4.8)
LYMPHOCYTES NFR BLD: 5.8 % (ref 18–48)
MAGNESIUM SERPL-MCNC: 1.6 MG/DL (ref 1.6–2.6)
MCH RBC QN AUTO: 23.9 PG (ref 27–31)
MCHC RBC AUTO-ENTMCNC: 27.2 G/DL (ref 32–36)
MCV RBC AUTO: 88 FL (ref 82–98)
MICROSCOPIC COMMENT: ABNORMAL
MIN VOL: 20
MIN VOL: 9.9
MODE: ABNORMAL
MONOCYTES # BLD AUTO: 0.6 K/UL (ref 0.3–1)
MONOCYTES NFR BLD: 9.3 % (ref 4–15)
MV PEAK E VEL: 1.44 M/S
NEUTROPHILS # BLD AUTO: 5.8 K/UL (ref 1.8–7.7)
NEUTROPHILS NFR BLD: 83.9 % (ref 38–73)
NITRITE UR QL STRIP: NEGATIVE
NRBC BLD-RTO: 0 /100 WBC
PCO2 BLDA: 77.2 MMHG (ref 35–45)
PCO2 BLDA: 93.6 MMHG (ref 35–45)
PCO2 BLDA: 99.2 MMHG (ref 35–45)
PH SMN: 7.29 [PH] (ref 7.35–7.45)
PH SMN: 7.32 [PH] (ref 7.35–7.45)
PH SMN: 7.4 [PH] (ref 7.35–7.45)
PH UR STRIP: 6 [PH] (ref 5–8)
PISA TR MAX VEL: 3.44 M/S
PLATELET # BLD AUTO: 157 K/UL (ref 150–350)
PMV BLD AUTO: 9.9 FL (ref 9.2–12.9)
PO2 BLDA: 51 MMHG (ref 80–100)
PO2 BLDA: 61 MMHG (ref 80–100)
PO2 BLDA: 81 MMHG (ref 80–100)
POC BE: 17 MMOL/L
POC BE: 18 MMOL/L
POC BE: 19 MMOL/L
POC SATURATED O2: 83 % (ref 95–100)
POC SATURATED O2: 86 % (ref 95–100)
POC SATURATED O2: 93 % (ref 95–100)
POC TCO2: >50 MMOL/L (ref 23–27)
POCT GLUCOSE: 100 MG/DL (ref 70–110)
POCT GLUCOSE: 103 MG/DL (ref 70–110)
POCT GLUCOSE: 106 MG/DL (ref 70–110)
POCT GLUCOSE: 110 MG/DL (ref 70–110)
POCT GLUCOSE: 116 MG/DL (ref 70–110)
POCT GLUCOSE: 128 MG/DL (ref 70–110)
POCT GLUCOSE: 129 MG/DL (ref 70–110)
POCT GLUCOSE: 134 MG/DL (ref 70–110)
POCT GLUCOSE: 135 MG/DL (ref 70–110)
POCT GLUCOSE: 138 MG/DL (ref 70–110)
POCT GLUCOSE: 144 MG/DL (ref 70–110)
POCT GLUCOSE: 151 MG/DL (ref 70–110)
POCT GLUCOSE: 166 MG/DL (ref 70–110)
POCT GLUCOSE: 167 MG/DL (ref 70–110)
POCT GLUCOSE: 168 MG/DL (ref 70–110)
POCT GLUCOSE: 175 MG/DL (ref 70–110)
POCT GLUCOSE: 178 MG/DL (ref 70–110)
POCT GLUCOSE: 192 MG/DL (ref 70–110)
POCT GLUCOSE: 220 MG/DL (ref 70–110)
POCT GLUCOSE: 92 MG/DL (ref 70–110)
POCT GLUCOSE: 97 MG/DL (ref 70–110)
POCT GLUCOSE: 99 MG/DL (ref 70–110)
POTASSIUM SERPL-SCNC: 3.6 MMOL/L (ref 3.5–5.1)
POTASSIUM SERPL-SCNC: 3.8 MMOL/L (ref 3.5–5.1)
POTASSIUM SERPL-SCNC: 3.9 MMOL/L (ref 3.5–5.1)
POTASSIUM SERPL-SCNC: 4.2 MMOL/L (ref 3.5–5.1)
POTASSIUM SERPL-SCNC: 4.2 MMOL/L (ref 3.5–5.1)
POTASSIUM SERPL-SCNC: 4.5 MMOL/L (ref 3.5–5.1)
PROT SERPL-MCNC: 6 G/DL (ref 6–8.4)
PROT SERPL-MCNC: 6.1 G/DL (ref 6–8.4)
PROT SERPL-MCNC: 6.3 G/DL (ref 6–8.4)
PROT SERPL-MCNC: 6.3 G/DL (ref 6–8.4)
PROT SERPL-MCNC: 6.5 G/DL (ref 6–8.4)
PROT SERPL-MCNC: 7.4 G/DL (ref 6–8.4)
PROT UR QL STRIP: ABNORMAL
PROT UR-MCNC: 39 MG/DL
PROT/CREAT UR: 0.43 MG/G{CREAT} (ref 0–0.2)
PROTHROMBIN TIME: 12.4 SEC (ref 9–12.5)
PV PEAK VELOCITY: 1.46 CM/S
RA MAJOR: 6.26 CM
RA PRESSURE: 15 MMHG
RA WIDTH: 4.04 CM
RBC # BLD AUTO: 4.39 M/UL (ref 4–5.4)
RBC #/AREA URNS HPF: 6 /HPF (ref 0–4)
RIGHT VENTRICULAR END-DIASTOLIC DIMENSION: 3.52 CM
SAMPLE: ABNORMAL
SARS-COV-2 RDRP RESP QL NAA+PROBE: NEGATIVE
SITE: ABNORMAL
SODIUM SERPL-SCNC: 139 MMOL/L (ref 136–145)
SODIUM SERPL-SCNC: 140 MMOL/L (ref 136–145)
SODIUM SERPL-SCNC: 141 MMOL/L (ref 136–145)
SODIUM SERPL-SCNC: 142 MMOL/L (ref 136–145)
SP GR UR STRIP: 1.01 (ref 1–1.03)
SP02: 100
SP02: 96
SPONT RATE: 15
SQUAMOUS #/AREA URNS HPF: 10 /HPF
STJ: 1.81 CM
TR MAX PG: 47 MMHG
TRICUSPID ANNULAR PLANE SYSTOLIC EXCURSION: 2.17 CM
TROPONIN I SERPL DL<=0.01 NG/ML-MCNC: 0.03 NG/ML (ref 0–0.03)
TROPONIN I SERPL DL<=0.01 NG/ML-MCNC: 0.04 NG/ML (ref 0–0.03)
TROPONIN I SERPL DL<=0.01 NG/ML-MCNC: 0.04 NG/ML (ref 0–0.03)
TSH SERPL DL<=0.005 MIU/L-ACNC: 0.73 UIU/ML (ref 0.4–4)
TV REST PULMONARY ARTERY PRESSURE: 62 MMHG
URN SPEC COLLECT METH UR: ABNORMAL
UROBILINOGEN UR STRIP-ACNC: NEGATIVE EU/DL
WBC # BLD AUTO: 6.89 K/UL (ref 3.9–12.7)
WBC #/AREA URNS HPF: 3 /HPF (ref 0–5)

## 2020-01-01 PROCEDURE — 25000242 PHARM REV CODE 250 ALT 637 W/ HCPCS: Performed by: EMERGENCY MEDICINE

## 2020-01-01 PROCEDURE — 27000221 HC OXYGEN, UP TO 24 HOURS

## 2020-01-01 PROCEDURE — 25000003 PHARM REV CODE 250: Performed by: HOSPITALIST

## 2020-01-01 PROCEDURE — 99900035 HC TECH TIME PER 15 MIN (STAT)

## 2020-01-01 PROCEDURE — 36415 COLL VENOUS BLD VENIPUNCTURE: CPT

## 2020-01-01 PROCEDURE — 25000003 PHARM REV CODE 250: Performed by: INTERNAL MEDICINE

## 2020-01-01 PROCEDURE — G0179 PR HOME HEALTH MD RECERTIFICATION: ICD-10-PCS | Mod: ,,, | Performed by: FAMILY MEDICINE

## 2020-01-01 PROCEDURE — 85025 COMPLETE CBC W/AUTO DIFF WBC: CPT

## 2020-01-01 PROCEDURE — 84443 ASSAY THYROID STIM HORMONE: CPT

## 2020-01-01 PROCEDURE — 63600175 PHARM REV CODE 636 W HCPCS: Performed by: INTERNAL MEDICINE

## 2020-01-01 PROCEDURE — 82803 BLOOD GASES ANY COMBINATION: CPT

## 2020-01-01 PROCEDURE — 1159F PR MEDICATION LIST DOCUMENTED IN MEDICAL RECORD: ICD-10-PCS | Mod: ,,, | Performed by: INTERNAL MEDICINE

## 2020-01-01 PROCEDURE — 1159F MED LIST DOCD IN RCRD: CPT | Mod: ,,, | Performed by: INTERNAL MEDICINE

## 2020-01-01 PROCEDURE — 27000190 HC CPAP FULL FACE MASK W/VALVE

## 2020-01-01 PROCEDURE — 97166 OT EVAL MOD COMPLEX 45 MIN: CPT

## 2020-01-01 PROCEDURE — 27100171 HC OXYGEN HIGH FLOW UP TO 24 HOURS

## 2020-01-01 PROCEDURE — 25000003 PHARM REV CODE 250: Performed by: EMERGENCY MEDICINE

## 2020-01-01 PROCEDURE — 25000242 PHARM REV CODE 250 ALT 637 W/ HCPCS: Performed by: INTERNAL MEDICINE

## 2020-01-01 PROCEDURE — 94761 N-INVAS EAR/PLS OXIMETRY MLT: CPT

## 2020-01-01 PROCEDURE — 94660 CPAP INITIATION&MGMT: CPT

## 2020-01-01 PROCEDURE — G0179 MD RECERTIFICATION HHA PT: HCPCS | Mod: ,,, | Performed by: FAMILY MEDICINE

## 2020-01-01 PROCEDURE — 93005 ELECTROCARDIOGRAM TRACING: CPT

## 2020-01-01 PROCEDURE — 94640 AIRWAY INHALATION TREATMENT: CPT

## 2020-01-01 PROCEDURE — 20000000 HC ICU ROOM

## 2020-01-01 PROCEDURE — 80053 COMPREHEN METABOLIC PANEL: CPT

## 2020-01-01 PROCEDURE — 84484 ASSAY OF TROPONIN QUANT: CPT

## 2020-01-01 PROCEDURE — 99233 SBSQ HOSP IP/OBS HIGH 50: CPT | Mod: ,,, | Performed by: INTERNAL MEDICINE

## 2020-01-01 PROCEDURE — 93010 ELECTROCARDIOGRAM REPORT: CPT | Mod: ,,, | Performed by: INTERNAL MEDICINE

## 2020-01-01 PROCEDURE — 99999 PR PBB SHADOW E&M-EST. PATIENT-LVL III: CPT | Mod: PBBFAC,,, | Performed by: INTERNAL MEDICINE

## 2020-01-01 PROCEDURE — 99214 OFFICE O/P EST MOD 30 MIN: CPT | Mod: S$PBB,,, | Performed by: INTERNAL MEDICINE

## 2020-01-01 PROCEDURE — 11000001 HC ACUTE MED/SURG PRIVATE ROOM

## 2020-01-01 PROCEDURE — 99285 EMERGENCY DEPT VISIT HI MDM: CPT | Mod: 25

## 2020-01-01 PROCEDURE — 27200966 HC CLOSED SUCTION SYSTEM

## 2020-01-01 PROCEDURE — 99291 CRITICAL CARE FIRST HOUR: CPT | Mod: ,,, | Performed by: NURSE PRACTITIONER

## 2020-01-01 PROCEDURE — 97116 GAIT TRAINING THERAPY: CPT

## 2020-01-01 PROCEDURE — 99213 OFFICE O/P EST LOW 20 MIN: CPT | Mod: PBBFAC | Performed by: INTERNAL MEDICINE

## 2020-01-01 PROCEDURE — 1126F PR PAIN SEVERITY QUANTIFIED, NO PAIN PRESENT: ICD-10-PCS | Mod: ,,, | Performed by: INTERNAL MEDICINE

## 2020-01-01 PROCEDURE — 85730 THROMBOPLASTIN TIME PARTIAL: CPT

## 2020-01-01 PROCEDURE — 97161 PT EVAL LOW COMPLEX 20 MIN: CPT

## 2020-01-01 PROCEDURE — 36600 WITHDRAWAL OF ARTERIAL BLOOD: CPT

## 2020-01-01 PROCEDURE — 83880 ASSAY OF NATRIURETIC PEPTIDE: CPT

## 2020-01-01 PROCEDURE — 82570 ASSAY OF URINE CREATININE: CPT

## 2020-01-01 PROCEDURE — 84484 ASSAY OF TROPONIN QUANT: CPT | Mod: 91

## 2020-01-01 PROCEDURE — 63600175 PHARM REV CODE 636 W HCPCS: Performed by: EMERGENCY MEDICINE

## 2020-01-01 PROCEDURE — 96374 THER/PROPH/DIAG INJ IV PUSH: CPT

## 2020-01-01 PROCEDURE — 99214 PR OFFICE/OUTPT VISIT, EST, LEVL IV, 30-39 MIN: ICD-10-PCS | Mod: S$PBB,,, | Performed by: INTERNAL MEDICINE

## 2020-01-01 PROCEDURE — 85610 PROTHROMBIN TIME: CPT

## 2020-01-01 PROCEDURE — U0002 COVID-19 LAB TEST NON-CDC: HCPCS

## 2020-01-01 PROCEDURE — 83036 HEMOGLOBIN GLYCOSYLATED A1C: CPT

## 2020-01-01 PROCEDURE — 85379 FIBRIN DEGRADATION QUANT: CPT

## 2020-01-01 PROCEDURE — 93010 EKG 12-LEAD: ICD-10-PCS | Mod: ,,, | Performed by: INTERNAL MEDICINE

## 2020-01-01 PROCEDURE — 83735 ASSAY OF MAGNESIUM: CPT

## 2020-01-01 PROCEDURE — 99233 PR SUBSEQUENT HOSPITAL CARE,LEVL III: ICD-10-PCS | Mod: ,,, | Performed by: INTERNAL MEDICINE

## 2020-01-01 PROCEDURE — 97530 THERAPEUTIC ACTIVITIES: CPT

## 2020-01-01 PROCEDURE — 1126F AMNT PAIN NOTED NONE PRSNT: CPT | Mod: ,,, | Performed by: INTERNAL MEDICINE

## 2020-01-01 PROCEDURE — 81000 URINALYSIS NONAUTO W/SCOPE: CPT

## 2020-01-01 PROCEDURE — 97110 THERAPEUTIC EXERCISES: CPT | Mod: CQ

## 2020-01-01 PROCEDURE — 99999 PR PBB SHADOW E&M-EST. PATIENT-LVL III: ICD-10-PCS | Mod: PBBFAC,,, | Performed by: INTERNAL MEDICINE

## 2020-01-01 PROCEDURE — 97535 SELF CARE MNGMENT TRAINING: CPT

## 2020-01-01 PROCEDURE — 99291 PR CRITICAL CARE, E/M 30-74 MINUTES: ICD-10-PCS | Mod: ,,, | Performed by: NURSE PRACTITIONER

## 2020-01-01 PROCEDURE — 25000003 PHARM REV CODE 250: Performed by: CLINIC/CENTER

## 2020-01-01 PROCEDURE — 63600175 PHARM REV CODE 636 W HCPCS: Performed by: HOSPITALIST

## 2020-01-01 RX ORDER — ATORVASTATIN CALCIUM 40 MG/1
80 TABLET, FILM COATED ORAL DAILY
Status: DISCONTINUED | OUTPATIENT
Start: 2020-01-01 | End: 2020-01-01 | Stop reason: HOSPADM

## 2020-01-01 RX ORDER — IBUPROFEN 400 MG/1
400 TABLET ORAL EVERY 8 HOURS PRN
Status: DISCONTINUED | OUTPATIENT
Start: 2020-01-01 | End: 2020-01-01 | Stop reason: HOSPADM

## 2020-01-01 RX ORDER — FORMOTEROL FUMARATE DIHYDRATE 20 UG/2ML
SOLUTION RESPIRATORY (INHALATION)
Qty: 60 VIAL | Refills: 11 | Status: SHIPPED | OUTPATIENT
Start: 2020-01-01 | End: 2020-01-01 | Stop reason: ALTCHOICE

## 2020-01-01 RX ORDER — FAMOTIDINE 20 MG/1
20 TABLET, FILM COATED ORAL 2 TIMES DAILY
Status: DISCONTINUED | OUTPATIENT
Start: 2020-01-01 | End: 2020-01-01 | Stop reason: HOSPADM

## 2020-01-01 RX ORDER — POLYETHYLENE GLYCOL 3350 17 G/17G
17 POWDER, FOR SOLUTION ORAL DAILY
Status: DISCONTINUED | OUTPATIENT
Start: 2020-01-01 | End: 2020-01-01 | Stop reason: HOSPADM

## 2020-01-01 RX ORDER — IPRATROPIUM BROMIDE AND ALBUTEROL SULFATE 2.5; .5 MG/3ML; MG/3ML
3 SOLUTION RESPIRATORY (INHALATION) EVERY 4 HOURS
Status: DISCONTINUED | OUTPATIENT
Start: 2020-01-01 | End: 2020-01-01

## 2020-01-01 RX ORDER — FUROSEMIDE 10 MG/ML
40 INJECTION INTRAMUSCULAR; INTRAVENOUS
Status: DISCONTINUED | OUTPATIENT
Start: 2020-01-01 | End: 2020-01-01

## 2020-01-01 RX ORDER — DILTIAZEM HYDROCHLORIDE 240 MG/1
CAPSULE, COATED, EXTENDED RELEASE ORAL
Qty: 90 CAPSULE | Refills: 2 | Status: SHIPPED | OUTPATIENT
Start: 2020-01-01 | End: 2020-01-01 | Stop reason: SDUPTHER

## 2020-01-01 RX ORDER — IBUPROFEN 400 MG/1
400 TABLET ORAL ONCE
Status: COMPLETED | OUTPATIENT
Start: 2020-01-01 | End: 2020-01-01

## 2020-01-01 RX ORDER — ALLOPURINOL 100 MG/1
100 TABLET ORAL DAILY
Status: CANCELLED | OUTPATIENT
Start: 2020-01-01

## 2020-01-01 RX ORDER — FUROSEMIDE 10 MG/ML
80 INJECTION INTRAMUSCULAR; INTRAVENOUS
Status: COMPLETED | OUTPATIENT
Start: 2020-01-01 | End: 2020-01-01

## 2020-01-01 RX ORDER — NAPROXEN SODIUM 220 MG/1
81 TABLET, FILM COATED ORAL DAILY
Status: DISCONTINUED | OUTPATIENT
Start: 2020-01-01 | End: 2020-01-01 | Stop reason: HOSPADM

## 2020-01-01 RX ORDER — METFORMIN HYDROCHLORIDE 500 MG/1
TABLET ORAL
Qty: 30 TABLET | Refills: 5 | Status: SHIPPED | OUTPATIENT
Start: 2020-01-01 | End: 2020-01-01 | Stop reason: ALTCHOICE

## 2020-01-01 RX ORDER — THEOPHYLLINE 300 MG/1
300 TABLET, EXTENDED RELEASE ORAL 2 TIMES DAILY
Status: DISCONTINUED | OUTPATIENT
Start: 2020-01-01 | End: 2020-01-01

## 2020-01-01 RX ORDER — LIDOCAINE 50 MG/G
1 PATCH TOPICAL
Status: DISCONTINUED | OUTPATIENT
Start: 2020-01-01 | End: 2020-01-01 | Stop reason: HOSPADM

## 2020-01-01 RX ORDER — DILTIAZEM HYDROCHLORIDE 240 MG/1
240 CAPSULE, COATED, EXTENDED RELEASE ORAL DAILY
Qty: 90 CAPSULE | Refills: 3 | Status: SHIPPED | OUTPATIENT
Start: 2020-01-01

## 2020-01-01 RX ORDER — GLUCAGON 1 MG
1 KIT INJECTION
Status: DISCONTINUED | OUTPATIENT
Start: 2020-01-01 | End: 2020-01-01 | Stop reason: HOSPADM

## 2020-01-01 RX ORDER — FUROSEMIDE 10 MG/ML
60 INJECTION INTRAMUSCULAR; INTRAVENOUS
Status: DISCONTINUED | OUTPATIENT
Start: 2020-01-01 | End: 2020-01-01

## 2020-01-01 RX ORDER — SODIUM CHLORIDE 0.9 % (FLUSH) 0.9 %
10 SYRINGE (ML) INJECTION
Status: DISCONTINUED | OUTPATIENT
Start: 2020-01-01 | End: 2020-01-01 | Stop reason: HOSPADM

## 2020-01-01 RX ORDER — IBUPROFEN 400 MG/1
400 TABLET ORAL ONCE AS NEEDED
Status: COMPLETED | OUTPATIENT
Start: 2020-01-01 | End: 2020-01-01

## 2020-01-01 RX ORDER — THEOPHYLLINE 300 MG/1
300 TABLET, EXTENDED RELEASE ORAL EVERY 12 HOURS
Status: DISCONTINUED | OUTPATIENT
Start: 2020-01-01 | End: 2020-01-01

## 2020-01-01 RX ORDER — DILTIAZEM HYDROCHLORIDE 120 MG/1
240 CAPSULE, COATED, EXTENDED RELEASE ORAL DAILY
Status: DISCONTINUED | OUTPATIENT
Start: 2020-01-01 | End: 2020-01-01 | Stop reason: HOSPADM

## 2020-01-01 RX ORDER — THEOPHYLLINE 300 MG/1
TABLET, EXTENDED RELEASE ORAL
Qty: 120 TABLET | Refills: 0 | Status: SHIPPED | OUTPATIENT
Start: 2020-01-01 | End: 2020-01-01

## 2020-01-01 RX ORDER — THEOPHYLLINE 300 MG/1
TABLET, EXTENDED RELEASE ORAL
Qty: 180 TABLET | Refills: 0 | Status: SHIPPED | OUTPATIENT
Start: 2020-01-01 | End: 2020-01-01

## 2020-01-01 RX ORDER — ALLOPURINOL 100 MG/1
TABLET ORAL
Qty: 90 TABLET | Refills: 1 | Status: SHIPPED | OUTPATIENT
Start: 2020-01-01 | End: 2020-07-31

## 2020-01-01 RX ORDER — HEPARIN SODIUM 5000 [USP'U]/ML
5000 INJECTION, SOLUTION INTRAVENOUS; SUBCUTANEOUS EVERY 12 HOURS
Status: DISCONTINUED | OUTPATIENT
Start: 2020-01-01 | End: 2020-01-01 | Stop reason: HOSPADM

## 2020-01-01 RX ORDER — THEOPHYLLINE 300 MG/1
TABLET, EXTENDED RELEASE ORAL
Qty: 180 TABLET | Refills: 1 | Status: SHIPPED | OUTPATIENT
Start: 2020-01-01

## 2020-01-01 RX ORDER — INSULIN ASPART 100 [IU]/ML
0-5 INJECTION, SOLUTION INTRAVENOUS; SUBCUTANEOUS EVERY 6 HOURS PRN
Status: DISCONTINUED | OUTPATIENT
Start: 2020-01-01 | End: 2020-01-01 | Stop reason: HOSPADM

## 2020-01-01 RX ORDER — IPRATROPIUM BROMIDE AND ALBUTEROL SULFATE 2.5; .5 MG/3ML; MG/3ML
3 SOLUTION RESPIRATORY (INHALATION)
Status: DISCONTINUED | OUTPATIENT
Start: 2020-01-01 | End: 2020-01-01 | Stop reason: HOSPADM

## 2020-01-01 RX ORDER — METHYLPREDNISOLONE SOD SUCC 125 MG
125 VIAL (EA) INJECTION EVERY 6 HOURS
Status: CANCELLED | OUTPATIENT
Start: 2020-01-01

## 2020-01-01 RX ADMIN — IPRATROPIUM BROMIDE AND ALBUTEROL SULFATE 3 ML: .5; 3 SOLUTION RESPIRATORY (INHALATION) at 11:06

## 2020-01-01 RX ADMIN — IPRATROPIUM BROMIDE AND ALBUTEROL SULFATE 3 ML: .5; 3 SOLUTION RESPIRATORY (INHALATION) at 07:06

## 2020-01-01 RX ADMIN — IPRATROPIUM BROMIDE AND ALBUTEROL SULFATE 3 ML: .5; 3 SOLUTION RESPIRATORY (INHALATION) at 04:06

## 2020-01-01 RX ADMIN — IBUPROFEN 400 MG: 400 TABLET ORAL at 02:06

## 2020-01-01 RX ADMIN — IPRATROPIUM BROMIDE AND ALBUTEROL SULFATE 3 ML: .5; 3 SOLUTION RESPIRATORY (INHALATION) at 12:06

## 2020-01-01 RX ADMIN — FAMOTIDINE 20 MG: 20 TABLET ORAL at 08:06

## 2020-01-01 RX ADMIN — ATORVASTATIN CALCIUM 80 MG: 40 TABLET, FILM COATED ORAL at 08:06

## 2020-01-01 RX ADMIN — NITROGLYCERIN 1 INCH: 20 OINTMENT TOPICAL at 05:06

## 2020-01-01 RX ADMIN — ACETAZOLAMIDE SODIUM 500 MG: 500 INJECTION, POWDER, LYOPHILIZED, FOR SOLUTION INTRAVENOUS at 05:06

## 2020-01-01 RX ADMIN — ASPIRIN 81 MG 81 MG: 81 TABLET ORAL at 08:06

## 2020-01-01 RX ADMIN — POLYETHYLENE GLYCOL 3350 17 G: 17 POWDER, FOR SOLUTION ORAL at 08:06

## 2020-01-01 RX ADMIN — FAMOTIDINE 20 MG: 20 TABLET ORAL at 09:06

## 2020-01-01 RX ADMIN — IPRATROPIUM BROMIDE AND ALBUTEROL SULFATE 3 ML: .5; 3 SOLUTION RESPIRATORY (INHALATION) at 01:06

## 2020-01-01 RX ADMIN — HEPARIN SODIUM 5000 UNITS: 5000 INJECTION, SOLUTION INTRAVENOUS; SUBCUTANEOUS at 09:06

## 2020-01-01 RX ADMIN — ASPIRIN 81 MG 81 MG: 81 TABLET ORAL at 10:06

## 2020-01-01 RX ADMIN — HEPARIN SODIUM 5000 UNITS: 5000 INJECTION, SOLUTION INTRAVENOUS; SUBCUTANEOUS at 08:06

## 2020-01-01 RX ADMIN — DILTIAZEM HYDROCHLORIDE 240 MG: 120 CAPSULE, COATED, EXTENDED RELEASE ORAL at 08:06

## 2020-01-01 RX ADMIN — IPRATROPIUM BROMIDE AND ALBUTEROL SULFATE 3 ML: .5; 3 SOLUTION RESPIRATORY (INHALATION) at 08:06

## 2020-01-01 RX ADMIN — FUROSEMIDE 60 MG: 10 INJECTION, SOLUTION INTRAMUSCULAR; INTRAVENOUS at 09:06

## 2020-01-01 RX ADMIN — FUROSEMIDE 60 MG: 10 INJECTION, SOLUTION INTRAMUSCULAR; INTRAVENOUS at 08:06

## 2020-01-01 RX ADMIN — DILTIAZEM HYDROCHLORIDE 240 MG: 120 CAPSULE, COATED, EXTENDED RELEASE ORAL at 10:06

## 2020-01-01 RX ADMIN — IPRATROPIUM BROMIDE AND ALBUTEROL SULFATE 3 ML: .5; 3 SOLUTION RESPIRATORY (INHALATION) at 03:06

## 2020-01-01 RX ADMIN — METHYLPREDNISOLONE SODIUM SUCCINATE 40 MG: 40 INJECTION, POWDER, FOR SOLUTION INTRAMUSCULAR; INTRAVENOUS at 09:06

## 2020-01-01 RX ADMIN — HEPARIN SODIUM 5000 UNITS: 5000 INJECTION, SOLUTION INTRAVENOUS; SUBCUTANEOUS at 10:06

## 2020-01-01 RX ADMIN — ACETAZOLAMIDE SODIUM 250 MG: 500 INJECTION, POWDER, LYOPHILIZED, FOR SOLUTION INTRAVENOUS at 07:06

## 2020-01-01 RX ADMIN — METHYLPREDNISOLONE SODIUM SUCCINATE 40 MG: 40 INJECTION, POWDER, FOR SOLUTION INTRAMUSCULAR; INTRAVENOUS at 05:06

## 2020-01-01 RX ADMIN — LIDOCAINE 1 PATCH: 50 PATCH TOPICAL at 06:06

## 2020-01-01 RX ADMIN — IBUPROFEN 400 MG: 400 TABLET, FILM COATED ORAL at 10:06

## 2020-01-01 RX ADMIN — ACETAZOLAMIDE SODIUM 250 MG: 500 INJECTION, POWDER, LYOPHILIZED, FOR SOLUTION INTRAVENOUS at 08:06

## 2020-01-01 RX ADMIN — FAMOTIDINE 20 MG: 20 TABLET ORAL at 10:06

## 2020-01-01 RX ADMIN — IBUPROFEN 400 MG: 400 TABLET ORAL at 05:06

## 2020-01-01 RX ADMIN — ATORVASTATIN CALCIUM 80 MG: 40 TABLET, FILM COATED ORAL at 10:06

## 2020-01-01 RX ADMIN — ACETAZOLAMIDE SODIUM 500 MG: 500 INJECTION, POWDER, LYOPHILIZED, FOR SOLUTION INTRAVENOUS at 02:06

## 2020-01-01 RX ADMIN — INSULIN ASPART 2 UNITS: 100 INJECTION, SOLUTION INTRAVENOUS; SUBCUTANEOUS at 05:06

## 2020-01-01 RX ADMIN — POLYETHYLENE GLYCOL 3350 17 G: 17 POWDER, FOR SOLUTION ORAL at 10:06

## 2020-01-01 RX ADMIN — NITROGLYCERIN 1 INCH: 20 OINTMENT TOPICAL at 12:06

## 2020-01-01 RX ADMIN — IBUPROFEN 400 MG: 400 TABLET, FILM COATED ORAL at 01:06

## 2020-01-01 RX ADMIN — IPRATROPIUM BROMIDE AND ALBUTEROL SULFATE 3 ML: .5; 3 SOLUTION RESPIRATORY (INHALATION) at 09:06

## 2020-01-01 RX ADMIN — ACETAZOLAMIDE SODIUM 250 MG: 500 INJECTION, POWDER, LYOPHILIZED, FOR SOLUTION INTRAVENOUS at 11:06

## 2020-01-01 RX ADMIN — IBUPROFEN 400 MG: 400 TABLET, FILM COATED ORAL at 06:06

## 2020-01-01 RX ADMIN — IPRATROPIUM BROMIDE AND ALBUTEROL SULFATE 3 ML: .5; 3 SOLUTION RESPIRATORY (INHALATION) at 05:06

## 2020-01-01 RX ADMIN — FUROSEMIDE 80 MG: 10 INJECTION, SOLUTION INTRAVENOUS at 01:06

## 2020-01-01 RX ADMIN — NITROGLYCERIN 1 INCH: 20 OINTMENT TOPICAL at 01:06

## 2020-01-07 NOTE — TELEPHONE ENCOUNTER
She cannot take aspirin and aleve together.  She can take one or the other.  Either medication will thin her blood more, so she has to be careful    Thanks

## 2020-01-13 NOTE — TELEPHONE ENCOUNTER
----- Message from Chun Yang sent at 1/13/2020  2:38 PM CST -----  Contact: DARIELA KURTZ [[id number]]  Name of Who is Calling: DARIELA KURTZ [[id number]]      What is the request in detail: Patient states this is urgent. Would like to speak with staff in regards to being in the doughnot hole and needing a refill on blood pressures medication. Wants to know what can she do in the meantime. Please advise     Can the clinic reply by [facility]: no      What Number to Call Back if not in [facility]: [phone]

## 2020-01-14 NOTE — TELEPHONE ENCOUNTER
No answer, LVM for patient to contact Dr. Monroe's office regarding her XARELTO.     *SEE PREVIOUS MESSAGE.

## 2020-01-20 PROBLEM — I48.19 OTHER PERSISTENT ATRIAL FIBRILLATION: Status: ACTIVE | Noted: 2020-01-01

## 2020-01-20 NOTE — PROGRESS NOTES
CARDIOVASCULAR PROGRESS NOTE    REASON FOR CONSULT:   Johanna Mancini is a 70 y.o. female who presents for f/u htn, hlp, AF.    Vasc: Sivakumar  PCP: Page  Pulm: Oz  HISTORY OF PRESENT ILLNESS:   The patient returns for follow-up, accompanied by family member.  At her last office visit, I instructed her to double up on her diltiazem to 480 mg daily, however it appears the patient has stopped taking her diltiazem.  She continues to complain of episodic palpitations, shortness of breath, and fatigue.  She is taking her Xarelto.  She also complains of a facial rash and I have encouraged her to follow-up with primary care physician for further evaluation.  This rash appears to predate her Xarelto prescription.  There otherwise has been no lightheadedness or syncope.  She denies PND, orthopnea, melena, hematuria, or claudicant symptoms.    I reviewed the results of her echo and Holter.  Aside from atrial fibrillation, both were essentially unrevealing.    I also discussed the possibility of moving forward with a ATIYA guided cardioversion in order to get the patient back into sinus rhythm given her above-mentioned symptoms.  The patient declines this intervention.    CARDIOVASCULAR HISTORY:   AF, persistent  Carotid stenosis R>L, followed by Dr. Shaver  [facility]    PAST MEDICAL HISTORY:     Past Medical History:   Diagnosis Date    Articular gout     Atrial fibrillation     Carotid artery occlusion     CHF (congestive heart failure)     COPD (chronic obstructive pulmonary disease)     Diabetes mellitus, type 2     Hyperlipidemia     Hypertension     Osteopetrosis     Osteoporosis     Sleep apnea        PAST SURGICAL HISTORY:     Past Surgical History:   Procedure Laterality Date    CHOLECYSTECTOMY      HYSTERECTOMY      MINERVA equivalent       ALLERGIES AND MEDICATION:     Review of patient's allergies indicates:   Allergen Reactions    Tylenol [acetaminophen] Anaphylaxis    Levofloxacin Itching         Medication List           Accurate as of January 20, 2020  1:16 PM. If you have any questions, ask your nurse or doctor.               CONTINUE taking these medications    albuterol 2.5 mg /3 mL (0.083 %) nebulizer solution  Commonly known as:  PROVENTIL  Take 3 mLs (2.5 mg total) by nebulization every 6 (six) hours as needed for Wheezing.     allopurinol 100 MG tablet  Commonly known as:  ZYLOPRIM  TAKE 1 TABLET(100 MG) BY MOUTH EVERY DAY     atorvastatin 80 MG tablet  Commonly known as:  LIPITOR  TAKE 1 TABLET BY MOUTH EVERY DAY     * blood sugar diagnostic Strp  To check BG 2 times daily, to use with insurance preferred meter     * blood sugar diagnostic Strp  To check BG 2 times daily, to use with insurance preferred meter     blood-glucose meter kit  To check BG 2 times daily, to use with insurance preferred meter     diltiaZEM 240 MG 24 hr capsule  Commonly known as:  CARDIZEM CD  TAKE ONE CAPSULE BY MOUTH EVERY DAY     furosemide 40 MG tablet  Commonly known as:  LASIX  Take 1 tablet (40 mg total) by mouth once daily.     ibuprofen 200 MG tablet  Commonly known as:  ADVIL,MOTRIN  Take 1 tablet (200 mg total) by mouth every 6 (six) hours as needed.     ipratropium 0.02 % nebulizer solution  Commonly known as:  ATROVENT     lancets Misc  To check BG 2 times daily, to use with insurance preferred meter     metFORMIN 500 MG tablet  Commonly known as:  GLUCOPHAGE  TAKE 1 TABLET BY MOUTH DAILY WITH BREAKFAST     predniSONE 10 MG tablet  Commonly known as:  DELTASONE     ranitidine 300 MG tablet  Commonly known as:  ZANTAC  Take 1 tablet (300 mg total) by mouth once daily.     rivaroxaban 20 mg Tab  Commonly known as:  Xarelto  Take 1 tablet (20 mg total) by mouth once daily.     simethicone 125 mg Cap capsule  Commonly known as:  MYLICON  Take 1 capsule (125 mg total) by mouth 4 (four) times daily as needed.     Symbicort 160-4.5 mcg/actuation Hfaa  Generic drug:  budesonide-formoterol 160-4.5 mcg      theophylline 300 MG 12 hr tablet  Commonly known as:  THEODUR  TAKE 1 TABLET BY MOUTH TWICE A DAY         * This list has 2 medication(s) that are the same as other medications prescribed for you. Read the directions carefully, and ask your doctor or other care provider to review them with you.                SOCIAL HISTORY:     Social History     Socioeconomic History    Marital status:      Spouse name: Not on file    Number of children: Not on file    Years of education: Not on file    Highest education level: Not on file   Occupational History    Not on file   Social Needs    Financial resource strain: Not on file    Food insecurity:     Worry: Not on file     Inability: Not on file    Transportation needs:     Medical: Not on file     Non-medical: Not on file   Tobacco Use    Smoking status: Former Smoker    Smokeless tobacco: Never Used   Substance and Sexual Activity    Alcohol use: No    Drug use: No    Sexual activity: Not on file   Lifestyle    Physical activity:     Days per week: Not on file     Minutes per session: Not on file    Stress: Not on file   Relationships    Social connections:     Talks on phone: Not on file     Gets together: Not on file     Attends Mu-ism service: Not on file     Active member of club or organization: Not on file     Attends meetings of clubs or organizations: Not on file     Relationship status: Not on file   Other Topics Concern    Not on file   Social History Narrative    .  Three children. Former smoker, quit 15 yr ago.  Retired , cook and kitchen work.        FAMILY HISTORY:     Family History   Problem Relation Age of Onset    Heart disease Mother     Cancer Father     Cancer Sister         breast       REVIEW OF SYSTEMS:   Review of Systems   Constitutional: Negative for chills, diaphoresis and fever.   HENT: Negative for nosebleeds.    Eyes: Negative for blurred vision, double vision and photophobia.   Respiratory:  "Positive for shortness of breath. Negative for hemoptysis and wheezing.    Cardiovascular: Positive for leg swelling. Negative for chest pain, palpitations, orthopnea, claudication and PND.   Gastrointestinal: Negative for abdominal pain, blood in stool, heartburn, melena, nausea and vomiting.   Genitourinary: Negative for flank pain and hematuria.   Musculoskeletal: Negative for falls, myalgias and neck pain.   Skin: Negative for rash.   Neurological: Negative for dizziness, seizures, loss of consciousness, weakness and headaches.   Endo/Heme/Allergies: Negative for polydipsia. Does not bruise/bleed easily.   Psychiatric/Behavioral: Negative for depression and memory loss. The patient is not nervous/anxious.        PHYSICAL EXAM:     Vitals:    01/20/20 1310   BP: (!) 181/84   Pulse: 88   Resp: 15    Body mass index is 45.93 kg/m².  Weight: 113.9 kg (251 lb 1.7 oz)   Height: 5' 2" (157.5 cm)     Physical Exam   Constitutional: She is oriented to person, place, and time. She appears well-developed and well-nourished. She is cooperative.  Non-toxic appearance. No distress.   HENT:   Head: Normocephalic and atraumatic.   Eyes: Pupils are equal, round, and reactive to light. Conjunctivae and EOM are normal. No scleral icterus.   Neck: Trachea normal and normal range of motion. Neck supple. Normal carotid pulses and no JVD present. Carotid bruit is not present. No neck rigidity. No tracheal deviation and no edema present. No thyromegaly present.   Cardiovascular: Normal rate, S1 normal and S2 normal. An irregularly irregular rhythm present. PMI is not displaced. Exam reveals distant heart sounds. Exam reveals no gallop and no friction rub.   No murmur heard.  Pulses:       Carotid pulses are 2+ on the right side, and 2+ on the left side.  Pulmonary/Chest: Effort normal and breath sounds normal. No stridor. No respiratory distress. She has no wheezes. She has no rales. She exhibits no tenderness.   Abdominal: Soft. She " exhibits no distension. There is no hepatosplenomegaly.   obese   Musculoskeletal: Normal range of motion. She exhibits edema. She exhibits no tenderness.   Feet:   Right Foot:   Skin Integrity: Negative for ulcer.   Left Foot:   Skin Integrity: Negative for ulcer.   Neurological: She is alert and oriented to person, place, and time. No cranial nerve deficit.   Skin: Skin is warm and dry. No rash noted. No erythema.   Psychiatric: She has a normal mood and affect. Her speech is normal and behavior is normal.   Vitals reviewed.      DATA:   EKG: (personally reviewed tracing(s))  12/9/19 AF 98, low volt.  AF new vs 4/8/19    Laboratory:  CBC:  Recent Labs   Lab 01/25/18  1810 12/27/19  0025   WBC 5.85 9.91   Hemoglobin 13.7 12.6   Hematocrit 43.3 43.5   Platelets 174 174       CHEMISTRIES:  Recent Labs   Lab 01/25/18  1810 10/04/19  0949 12/27/19  0025   Glucose 103 115 H 116 H   Sodium 141 140 142   Potassium 3.8 3.5 4.4   BUN, Bld 18 16 15   Creatinine 0.8 0.7 0.7   eGFR if  >60 >60 >60   eGFR if non African American >60 >60 >60   Calcium 9.9 9.5 9.7       CARDIAC BIOMARKERS:  Recent Labs   Lab 01/25/18  1810 12/27/19  0025 12/27/19  0302   Troponin I <0.006 0.022 0.026       COAGS:        LIPIDS/LFTS:  Recent Labs   Lab 04/22/19  1121 10/04/19  0949 12/27/19  0025   Cholesterol 130  --   --    Triglycerides 109  --   --    HDL 52  --   --    LDL Cholesterol 56.2 L  --   --    Non-HDL Cholesterol 78  --   --    AST 15 17 25   ALT 13 25 35       Cardiovascular Testing:  Holter 12/17/19  · Atrial fibrillation.  · Heart rates varied between 70 and 174 bpm with an average of 113 bpm.  · The diary was not returned. There were persistent hookup related artifacts. Overall, the study was of suboptimal quality. The tape was adequate (1 days , 23 hours, 59 minutes).    Echo 12/17/19  · Normal left ventricular systolic function. The estimated ejection fraction is 65%  · Mild concentric left ventricular  hypertrophy.  · Atrial fibrillation observed.  · Normal right ventricular systolic function.  · Moderate to severe left atrial enlargement.  · Moderate right atrial enlargement.  · Mild-to-moderate mitral regurgitation.  · Mild tricuspid regurgitation.  · Normal central venous pressure (3 mm Hg).  · The estimated PA systolic pressure is 50 mm Hg  · Pulmonary hypertension present.    Aortic US 3/25/19  Abdominal aortic ultrasound shows maximal aortic diameter of 2.65 x 2.45 at the infrarenal level.  There is no iliac artery aneurysm.      Carotid US 3/14/19 (similar to report 9/27/18)  1. Right carotid ultrasound shows 80-99% internal carotid artery stenosis.  Antegrade vertebral artery flow.  2. Left carotid ultrasound shows  60-79% internal carotid artery stenosis.  Antegrade vertebral artery flow.    Tracy MPI 10/15/18   · The perfusion scan is free of evidence from myocardial ischemia or injury.  · Post Stress Ejection Fraction is 73 %    LE venous US/reflux 6/20/18  1. There is hemodynamically significant venous reflux (reflux lasting greater than 500 ms) within the bilateral greater saphenous vein.  2. There is no evidence of bilateral lower extremity deep venous thrombosis.    ASSESSMENT:   # AF, persistent (?ongoing from recent hospitalization at [facility] 9/2019)  # PARIKH, chronic, normal MPI 10/2018  # HTN, uncontrolled in setting of med nonadherence  # O2 dep COPD, follows with Dr. Clinton  # HLP, on atorva 40mg  # DM  # BMI 46, down 1 unit(s) vs last OV  # CVI/LE edema, chronic  # carotid stenosis, follows with Dr. Shaver    PLAN:   Cont med rx  Cont Xarelto 20mg qd.    Restart cardizem 240mg qd  Pt declines DCCV/ATIYA  Defer management of facial rash to PCP, this rash appears to predate her Xarelto prescription.  RTC 3 months    Desean Monroe MD, FACC

## 2020-03-09 NOTE — TELEPHONE ENCOUNTER
[facility] called to report that patient is non-compliance with medication Eliquis and is taking aspirin again.  Would like to know what Dr. Mcintosh suggests regarding this.  Please advise.

## 2020-03-09 NOTE — TELEPHONE ENCOUNTER
----- Message from Kerri Chavez sent at 3/9/2020  2:59 PM CDT -----  Type: Patient Call Back    Who called: Mary - [facility]     What is the request in detail:called to notify the doctor the patient is non compliance with taking her Eliquis and is taking Asprin again.     Can the clinic reply by [facility]? No     Would the patient rather a call back or a response via [facility]?  Call     Best call back number:[phone]

## 2020-03-12 NOTE — TELEPHONE ENCOUNTER
I left a voicemail for Bee with PCP recommendations and provided contact information to return the call.

## 2020-03-12 NOTE — TELEPHONE ENCOUNTER
----- Message from Lynda Sahni sent at 3/12/2020  1:16 PM CDT -----  Contact: vickie morales  Type: Patient Call Back     What is the request in detail: Vickie calling to let dr know that pt fell and hurt her hip. Vickie states she did not hit her head and pt did not go and see the dr for the fall. Vickie saids pt is not in any distress.     Can the clinic reply by [facility]? No     Would the patient rather a call back or a response via [facility]? Call back     Best call back number: [phone]

## 2020-03-12 NOTE — TELEPHONE ENCOUNTER
As long as patient is ambulating well, then nothing needs to be done.  If pain persists, she needs to be seen    Thanks  Dr. Mcintosh

## 2020-04-07 NOTE — TELEPHONE ENCOUNTER
----- Message from Haja Mckeon sent at 4/7/2020 12:33 PM CDT -----  Contact: Johanna [phone]  Type:  Needs Medical Advice/Symptom-based Call    Who Called: Johanna     Symptoms (please be specific): trouble sleeping at night    How long has patient had these symptoms: several weeks    Would the patient rather a call back or a response via [facility]? Call back     Best Call Back Number: (#The patient called to request medication to be sent to the pharmacy to help her sleep. The patient requested something mild. She stated that she has been having issues sleeping for the past several weeks. If possible, she would like it sent to: [id number]  [place] - [address] AT [facility] [address] [facility] [address])    Additional Information: [phone]

## 2020-04-16 NOTE — TELEPHONE ENCOUNTER
The pt returned my call and states that she is still having trouble sleeping. She tosses and turns and notices she coughs when she lays flat. She does report some swelling in her lower extremities. She also wants to reports that her cardiologist put her on Xarelto and it caused her hemorrhoids to bleed so she took herself off of it and restarted her ASA. I advised her to notify her cardiologist of the medication change.

## 2020-06-11 NOTE — TELEPHONE ENCOUNTER
----- Message from Katty Johnson sent at 6/11/2020  1:10 PM CDT -----  Contact: Kika with [employer] / # [phone]  Name of Who is Calling:  Kika with [employer] [employer]      What is the request in detail: Kika the above patient's home health nurse is faxing over orders for a wheel chair and a hospital bed.  Please sign and complete at your earliest convenience.    THANKS!      Reply by [facility]:  no    What Number to Call Back:  Kika with [employer] / # [phone]

## 2020-06-25 PROBLEM — Z99.81 CHRONIC RESPIRATORY FAILURE WITH HYPOXIA, ON HOME O2 THERAPY: Status: ACTIVE | Noted: 2020-01-01

## 2020-06-25 PROBLEM — J96.20 ACUTE ON CHRONIC RESPIRATORY FAILURE: Status: ACTIVE | Noted: 2020-01-01

## 2020-06-25 PROBLEM — J96.90 RESPIRATORY FAILURE: Status: ACTIVE | Noted: 2020-01-01

## 2020-06-25 PROBLEM — J96.11 CHRONIC RESPIRATORY FAILURE WITH HYPOXIA, ON HOME O2 THERAPY: Status: ACTIVE | Noted: 2020-01-01

## 2020-06-25 NOTE — TELEPHONE ENCOUNTER
I spoke to the pt daughter-in-law and she advised pt legs are very swollen with the right being worse than the left and they are draining clear fluid. Advised per PCP that she should go to the ER for evaluation. Daughter verbalizes understanding.

## 2020-06-25 NOTE — ASSESSMENT & PLAN NOTE
Blood pressure high on arrival however patient was in respiratory distress  Address respiratory issues and resume diltiazem  Will add antihypertensives if needed

## 2020-06-25 NOTE — SUBJECTIVE & OBJECTIVE
Past Medical History:   Diagnosis Date    Articular gout     Atrial fibrillation     Carotid artery occlusion     CHF (congestive heart failure)     COPD (chronic obstructive pulmonary disease)     Diabetes mellitus, type 2     Hyperlipidemia     Hypertension     Osteopetrosis     Osteoporosis     Sleep apnea        Past Surgical History:   Procedure Laterality Date    CHOLECYSTECTOMY      HYSTERECTOMY      MINERVA equivalent       Review of patient's allergies indicates:   Allergen Reactions    Tylenol [acetaminophen] Anaphylaxis    Hydrocodone-acetaminophen Itching    Levofloxacin Itching       No current facility-administered medications on file prior to encounter.      Current Outpatient Medications on File Prior to Encounter   Medication Sig    albuterol (PROVENTIL) 2.5 mg /3 mL (0.083 %) nebulizer solution Take 3 mLs (2.5 mg total) by nebulization every 6 (six) hours as needed for Wheezing.    allopurinoL (ZYLOPRIM) 100 MG tablet TAKE 1 TABLET BY MOUTH EVERYDAY    atorvastatin (LIPITOR) 80 MG tablet TAKE 1 TABLET BY MOUTH EVERY DAY    blood sugar diagnostic Strp To check BG 2 times daily, to use with insurance preferred meter    blood sugar diagnostic Strp To check BG 2 times daily, to use with insurance preferred meter    blood-glucose meter kit To check BG 2 times daily, to use with insurance preferred meter    diltiaZEM (CARDIZEM CD) 240 MG 24 hr capsule Take 1 capsule (240 mg total) by mouth once daily.    furosemide (LASIX) 40 MG tablet Take 1 tablet (40 mg total) by mouth once daily.    ibuprofen (ADVIL,MOTRIN) 200 MG tablet Take 1 tablet (200 mg total) by mouth every 6 (six) hours as needed.    ipratropium (ATROVENT) 0.02 % nebulizer solution Inhale into the lungs.    lancets Misc To check BG 2 times daily, to use with insurance preferred meter    predniSONE (DELTASONE) 10 MG tablet TAKE 5 TABLETS BY MOUTH EVERY DAY FOR 5 DAYS    ranitidine (ZANTAC) 300 MG tablet Take 1 tablet  (300 mg total) by mouth once daily.    rivaroxaban (XARELTO) 20 mg Tab Take 1 tablet (20 mg total) by mouth once daily.    simethicone (MYLICON) 125 mg Cap capsule Take 1 capsule (125 mg total) by mouth 4 (four) times daily as needed.    theophylline (THEODUR) 300 MG 12 hr tablet TAKE 1 TABLET BY MOUTH TWICE DAILY    [DISCONTINUED] budesonide-formoterol 160-4.5 mcg (SYMBICORT) 160-4.5 mcg/actuation HFAA Inhale into the lungs.    [DISCONTINUED] metFORMIN (GLUCOPHAGE) 500 MG tablet TAKE 1 TABLET BY MOUTH DAILY WITH BREAKFAST    [DISCONTINUED] PERFOROMIST 20 mcg/2 mL Nebu USE 1 VIAL  IN  NEBULIZER TWICE  DAILY     Family History     Problem Relation (Age of Onset)    Cancer Father, Sister    Heart disease Mother        Tobacco Use    Smoking status: Former Smoker    Smokeless tobacco: Never Used   Substance and Sexual Activity    Alcohol use: No    Drug use: No    Sexual activity: Not on file     Review of Systems   Unable to perform ROS: Acuity of condition     Objective:     Vital Signs (Most Recent):  Temp: 98.5 °F (36.9 °C) (06/25/20 1415)  Pulse: 92 (06/25/20 1605)  Resp: (!) 21 (06/25/20 1605)  BP: (!) 148/86 (06/25/20 1605)  SpO2: 96 % (06/25/20 1605) Vital Signs (24h Range):  Temp:  [98.5 °F (36.9 °C)] 98.5 °F (36.9 °C)  Pulse:  [] 92  Resp:  [21-38] 21  SpO2:  [93 %-100 %] 96 %  BP: (143-214)/(70-86) 148/86        There is no height or weight on file to calculate BMI.    Physical Exam  Vitals signs and nursing note reviewed.   Constitutional:       Appearance: She is obese. She is ill-appearing.   HENT:      Head: Normocephalic.   Cardiovascular:      Rate and Rhythm: Normal rate and regular rhythm.   Pulmonary:      Effort: Pulmonary effort is normal.      Breath sounds: Rales present. No wheezing.      Comments: On BiPAP  Abdominal:      Comments: Pitting edema to lower quadrants and panus  Hernia that is soft and reducible    Musculoskeletal:         General: Swelling (+ 3, wheeping )  present.   Skin:     Capillary Refill: Capillary refill takes 2 to 3 seconds.      Comments: Very moist  Pale erythema to medial aspect of thighs   Neurological:      Comments: Somnolent              Significant Labs: All pertinent labs within the past 24 hours have been reviewed.    Significant Imaging: I have reviewed all pertinent imaging results/findings within the past 24 hours.  I have reviewed and interpreted all pertinent imaging results/findings within the past 24 hours.

## 2020-06-25 NOTE — ASSESSMENT & PLAN NOTE
Classified as severe by her pulmonologist  We do not have thelliot in our facility  Doing nebulized treatments and steroids

## 2020-06-25 NOTE — H&P
"[facility] Hospital Medicine  History & Physical    Patient Name: Johanna Mancini  MRN: [id number]  Admission Date: 6/25/2020  Attending Physician: Khoi Cardenas MD   Primary Care Provider: Johnson Mcintosh MD         Patient information was obtained from relative(s), past medical records and ER records.     Subjective:     Principal Problem:Acute on chronic respiratory failure    Chief Complaint:   Chief Complaint   Patient presents with    Leg Swelling     bilateral leg swelling with weeping     Shortness of Breath     Pt initial 70% on 4L with "20feet" of tubing. Pt placed on CPAP currently 91%        HPI: 70 year old female with chronic obstructive pulmonary disease, chronic respiratory failure on home O2, obesity, diabetes mellitus type 2 and atrial fibrillation who presents from home with shortness of breath and lower extremity edema.  Patient unable to provide history at this time as she is somnolent and on NIPPV. I called her daughter, Britt, who states she last saw patient 2 days ago and appeared to be at her normal state. States patient lives with her brother Alli. I called Alli. He states patient has not been doing well lately. He adds that she has been "out of it", "falls asleep on her chair", "can't get up", forgetful and an episode of nose bleed. Also mentioned she might be short of breath despite using home O2. He cannot recall all of her meds but will get back to us once he has her list. He is worried about her when on her own as he works. Per chart review, patient was initiated on Xarelto likely for stroke prophylaxis for AFib. Apparently patient stopped taking it Xarelto due to an episode of nose bleed and only taking aspirin.     Per ED staff, EMS reported saturation of 70% on arrival.  She was placed on CPAP and transported to our emergency department.  On arrival, patient was tachypneic.  Placed on BiPAP and given a dose of IV furosemide.  Patient nods yes to feeling a " little better but not ready to come off BiPAP.  Admitted to ICU for further management.    Past Medical History:   Diagnosis Date    Articular gout     Atrial fibrillation     Carotid artery occlusion     CHF (congestive heart failure)     COPD (chronic obstructive pulmonary disease)     Diabetes mellitus, type 2     Hyperlipidemia     Hypertension     Osteopetrosis     Osteoporosis     Sleep apnea        Past Surgical History:   Procedure Laterality Date    CHOLECYSTECTOMY      HYSTERECTOMY      MINERVA equivalent       Review of patient's allergies indicates:   Allergen Reactions    Tylenol [acetaminophen] Anaphylaxis    Hydrocodone-acetaminophen Itching    Levofloxacin Itching       No current facility-administered medications on file prior to encounter.      Current Outpatient Medications on File Prior to Encounter   Medication Sig    albuterol (PROVENTIL) 2.5 mg /3 mL (0.083 %) nebulizer solution Take 3 mLs (2.5 mg total) by nebulization every 6 (six) hours as needed for Wheezing.    allopurinoL (ZYLOPRIM) 100 MG tablet TAKE 1 TABLET BY MOUTH EVERYDAY    atorvastatin (LIPITOR) 80 MG tablet TAKE 1 TABLET BY MOUTH EVERY DAY    blood sugar diagnostic Strp To check BG 2 times daily, to use with insurance preferred meter    blood sugar diagnostic Strp To check BG 2 times daily, to use with insurance preferred meter    blood-glucose meter kit To check BG 2 times daily, to use with insurance preferred meter    diltiaZEM (CARDIZEM CD) 240 MG 24 hr capsule Take 1 capsule (240 mg total) by mouth once daily.    furosemide (LASIX) 40 MG tablet Take 1 tablet (40 mg total) by mouth once daily.    ibuprofen (ADVIL,MOTRIN) 200 MG tablet Take 1 tablet (200 mg total) by mouth every 6 (six) hours as needed.    ipratropium (ATROVENT) 0.02 % nebulizer solution Inhale into the lungs.    lancets Misc To check BG 2 times daily, to use with insurance preferred meter    predniSONE (DELTASONE) 10 MG tablet TAKE 5  TABLETS BY MOUTH EVERY DAY FOR 5 DAYS    ranitidine (ZANTAC) 300 MG tablet Take 1 tablet (300 mg total) by mouth once daily.    rivaroxaban (XARELTO) 20 mg Tab Take 1 tablet (20 mg total) by mouth once daily.    simethicone (MYLICON) 125 mg Cap capsule Take 1 capsule (125 mg total) by mouth 4 (four) times daily as needed.    theophylline (THEODUR) 300 MG 12 hr tablet TAKE 1 TABLET BY MOUTH TWICE DAILY    [DISCONTINUED] budesonide-formoterol 160-4.5 mcg (SYMBICORT) 160-4.5 mcg/actuation HFAA Inhale into the lungs.    [DISCONTINUED] metFORMIN (GLUCOPHAGE) 500 MG tablet TAKE 1 TABLET BY MOUTH DAILY WITH BREAKFAST    [DISCONTINUED] PERFOROMIST 20 mcg/2 mL Nebu USE 1 VIAL  IN  NEBULIZER TWICE  DAILY     Family History     Problem Relation (Age of Onset)    Cancer Father, Sister    Heart disease Mother        Tobacco Use    Smoking status: Former Smoker    Smokeless tobacco: Never Used   Substance and Sexual Activity    Alcohol use: No    Drug use: No    Sexual activity: Not on file     Review of Systems   Unable to perform ROS: Acuity of condition     Objective:     Vital Signs (Most Recent):  Temp: 98.5 °F (36.9 °C) (06/25/20 1415)  Pulse: 92 (06/25/20 1605)  Resp: (!) 21 (06/25/20 1605)  BP: (!) 148/86 (06/25/20 1605)  SpO2: 96 % (06/25/20 1605) Vital Signs (24h Range):  Temp:  [98.5 °F (36.9 °C)] 98.5 °F (36.9 °C)  Pulse:  [] 92  Resp:  [21-38] 21  SpO2:  [93 %-100 %] 96 %  BP: (143-214)/(70-86) 148/86        There is no height or weight on file to calculate BMI.    Physical Exam  Vitals signs and nursing note reviewed.   Constitutional:       Appearance: She is obese. She is ill-appearing.   HENT:      Head: Normocephalic.   Cardiovascular:      Rate and Rhythm: Normal rate and regular rhythm.   Pulmonary:      Effort: Pulmonary effort is normal.      Breath sounds: Rales present. No wheezing.      Comments: On BiPAP  Abdominal:      Comments: Pitting edema to lower quadrants and panus  Hernia that  is soft and reducible    Musculoskeletal:         General: Swelling (+ 3, wheeping ) present.   Skin:     Capillary Refill: Capillary refill takes 2 to 3 seconds.      Comments: Very moist  Pale erythema to medial aspect of thighs   Neurological:      Comments: Somnolent              Significant Labs: All pertinent labs within the past 24 hours have been reviewed.    Significant Imaging: I have reviewed all pertinent imaging results/findings within the past 24 hours.  I have reviewed and interpreted all pertinent imaging results/findings within the past 24 hours.    Assessment/Plan:     * Acute on chronic respiratory failure  Known COPD on home O2  Possibly with heart failure too  I don't have an ABG  Will get one and continue on BiPAP while she diureses further  Will add nebulized bronchodilators and steroids  CMP daily. Strict I/Os  Vitals every hour  Will consult pulmonology if does not improve with mentioned treatment      Chronic respiratory failure with hypoxia, on home O2 therapy  as above      Other persistent atrial fibrillation  Rate controlled  Resume oral diltiazem  Will discuss anticoagulation with patient which she had complained about epistaxis while on it  Will do aspirin in the meantime  Cardiac monitoring.  Reviewed electrolytes      Type 2 diabetes mellitus without complication, without long-term current use of insulin  Hemoglobin A1c pending  Hold metformin  Start insulin regimen and adjust as needed for goal glucose [id number]      Hypertension, essential  Blood pressure high on arrival however patient was in respiratory distress  Address respiratory issues and resume diltiazem  Will add antihypertensives if needed      COPD (chronic obstructive pulmonary disease)  Classified as severe by her pulmonologist  We do not have thophylline in our facility  Doing nebulized treatments and steroids          VTE Risk Mitigation (From admission, onward)         Ordered     heparin (porcine) injection 5,000  Units  Every 12 hours      06/25/20 1711     IP VTE HIGH RISK PATIENT  Once      06/25/20 1614     Place sequential compression device  Until discontinued      06/25/20 1614               Time spent: > 45 minutes in patient's care  Discussed with daughter and son over the phone      Bee Khan MD  Department of Hospital Medicine   [facility]

## 2020-06-25 NOTE — ASSESSMENT & PLAN NOTE
Hemoglobin A1c pending  Hold metformin  Start insulin regimen and adjust as needed for goal glucose [id number]     continue present treatment as per psych plan as reviewed  Medically stable with no new changes in treatment  will continue to monitor medical status while being treated on psych

## 2020-06-25 NOTE — ASSESSMENT & PLAN NOTE
Known COPD on home O2  Possibly with heart failure too  I don't have an ABG  Will get one and continue on BiPAP while she diureses further  Will add nebulized bronchodilators and steroids  CMP daily. Strict I/Os  Vitals every hour  Will consult pulmonology if does not improve with mentioned treatment

## 2020-06-25 NOTE — ASSESSMENT & PLAN NOTE
Rate controlled  Resume oral diltiazem  Will discuss anticoagulation with patient which she had complained about epistaxis while on it  Will do aspirin in the meantime  Cardiac monitoring.  Reviewed electrolytes

## 2020-06-25 NOTE — ED PROVIDER NOTES
"Encounter Date: 6/25/2020       History     Chief Complaint   Patient presents with    Leg Swelling     bilateral leg swelling with weeping     Shortness of Breath     Pt initial 70% on 4L with "20feet" of tubing. Pt placed on CPAP currently 91%     HPI   This 70-year-old female presents to the emergency room complaining shortness of breath chest pain and swelling of the legs.  The patient has a history of atrial fibrillation and congestive heart failure as well as chronic obstructive pulmonary disease she has wheezing.  She had oxygen saturations of 70% in the field on arrival.  She was transported on BiPAP.  Review of patient's allergies indicates:   Allergen Reactions    Tylenol [acetaminophen] Anaphylaxis    Hydrocodone-acetaminophen Itching    Levofloxacin Itching     Past Medical History:   Diagnosis Date    Articular gout     Atrial fibrillation     Carotid artery occlusion     CHF (congestive heart failure)     COPD (chronic obstructive pulmonary disease)     Diabetes mellitus, type 2     Hyperlipidemia     Hypertension     Osteopetrosis     Osteoporosis     Sleep apnea      Past Surgical History:   Procedure Laterality Date    CHOLECYSTECTOMY      HYSTERECTOMY      MINERVA equivalent     Family History   Problem Relation Age of Onset    Heart disease Mother     Cancer Father     Cancer Sister         breast     Social History     Tobacco Use    Smoking status: Former Smoker    Smokeless tobacco: Never Used   Substance Use Topics    Alcohol use: No    Drug use: No     Review of Systems  The patient was questioned specifically with regard to the following.  General: Fever, chills, sweats. Neuro: Headache. Eyes: eye problems. ENT: Ear pain, sore throat. Cardiovascular: Chest pain. Respiratory: Cough, shortness of breath. Gastrointestinal: Abdominal pain, vomiting, diarrhea. Genitourinary: Painful urination.  Musculoskeletal: Arm and leg problems. Skin: Rash.  The review of systems was " negative except for the following:  Chest pain shortness of breath swelling in the legs  Physical Exam     Initial Vitals   BP Pulse Resp Temp SpO2   06/25/20 1341 06/25/20 1341 06/25/20 1341 06/25/20 1415 06/25/20 1341   (!) 143/86 101 (!) 38 98.5 °F (36.9 °C) 100 %      MAP       --                Physical Exam  The patient was examined specifically for the following:   General:No significant distress, Good color, Warm and dry. Head and neck:Scalp atraumatic, Neck supple. Neurological:Appropriate conversation, Gross motor deficits. Eyes:Conjugate gaze, Clear corneas. ENT: No epistaxis. Cardiac: Regular rate and rhythm, Grossly normal heart tones. Pulmonary: Wheezing, Rales. Gastrointestinal: Abdominal tenderness, Abdominal distention. Musculoskeletal: Extremity deformity, Apparent pain with range of motion of the joints. Skin: Rash.   The findings on examination were normal except for the following:  Patient is morbidly obese.  She has generalized anasarca.  She has open weeping wounds on the right shin.  She has distant breath sounds with scattered wheezing.  She is alert and conversational.  She appears to be in moderately severe respiratory distress.  ED Course   Procedures  Labs Reviewed   COMPREHENSIVE METABOLIC PANEL - Abnormal; Notable for the following components:       Result Value    Chloride 91 (*)     CO2 43 (*)     Glucose 184 (*)     Albumin 3.4 (*)     Alkaline Phosphatase 142 (*)     Anion Gap 6 (*)     All other components within normal limits    Narrative:     CO2 critical result(s) called and verbal readback obtained from DR. FALK by [facility] 06/25/2020 14:28   TROPONIN I - Abnormal; Notable for the following components:    Troponin I 0.043 (*)     All other components within normal limits   B-TYPE NATRIURETIC PEPTIDE - Abnormal; Notable for the following components:     (*)     All other components within normal limits   CBC W/ AUTO DIFFERENTIAL - Abnormal; Notable for the following  components:    Hemoglobin 10.5 (*)     Mean Corpuscular Hemoglobin 23.9 (*)     Mean Corpuscular Hemoglobin Conc 27.2 (*)     RDW 19.4 (*)     Lymph # 0.4 (*)     Gran% 83.9 (*)     Lymph% 5.8 (*)     All other components within normal limits   URINALYSIS - Abnormal; Notable for the following components:    Protein, UA 1+ (*)     All other components within normal limits   URINALYSIS MICROSCOPIC - Abnormal; Notable for the following components:    RBC, UA 6 (*)     Hyaline Casts, UA >10 (*)     All other components within normal limits   POCT GLUCOSE - Abnormal; Notable for the following components:    POCT Glucose 192 (*)     All other components within normal limits   APTT   PROTIME-INR   MAGNESIUM   SARS-COV-2 RNA AMPLIFICATION, QUAL   POCT URINALYSIS W/O SCOPE     EKG Readings: (Independently Interpreted)   This patient is in atrial fibrillation with a heart rate in 96.  There are nonspecific ST segment and T-wave changes.  The patient has very low voltage QRS complexes.       Imaging Results          X-Ray Chest AP Portable (Final result)  Result time 06/25/20 14:12:15    Final result by Kieran Steel MD (06/25/20 14:12:15)                 Impression:      1. Pulmonary interstitial edema without sizable pleural effusion.      Electronically signed by: Kieran Steel  Date:    06/25/2020  Time:    14:12             Narrative:    EXAMINATION:  XR CHEST AP PORTABLE    CLINICAL HISTORY:  chest pain;    TECHNIQUE:  Single frontal portable view of the chest was performed.    Of note, patient's chin in head overlie the bilateral upper lung fields, limiting evaluation.    COMPARISON:  Chest radiograph 12/27/2019    FINDINGS:  Cardiomediastinal silhouette is within normal limits.    Pulmonary interstitial edema without sizable pleural effusion.  No appreciable focal consolidation or pneumothorax in the field of view.    Multilevel degenerative changes of the imaged spine.                              Medical decision  making:  Given the above this patient presents to the emergency with shortness of breath.  She had oxygen saturations of 70% in the field.  She was treated with CPAP on route to the emergency room.  This patient has a history of COPD and congestive heart failure.  H COVID testing is negative.  er BNP is 383.  She has anasarca.  Chest x-ray shows pulmonary edema but no pneumonia.                                      Clinical Impression:       ICD-10-CM ICD-9-CM   1. Respiratory failure, unspecified chronicity, unspecified whether with hypoxia or hypercapnia  J96.90 518.81   2. Chest pain  R07.9 786.50   3. Acute bronchospasm  J98.01 519.11   4. Anasarca  R60.1 782.3   5. Congestive heart failure, unspecified HF chronicity, unspecified heart failure type  I50.9 428.0             ED Disposition Condition    Admit                           Khoi Cardenas MD  06/25/20 1552       Khoi Cardenas MD  06/25/20 1800

## 2020-06-25 NOTE — HPI
"70 year old female with chronic obstructive pulmonary disease, chronic respiratory failure on home O2, obesity, diabetes mellitus type 2 and atrial fibrillation who presents from home with shortness of breath and lower extremity edema.  Patient unable to provide history at this time as she is somnolent and on NIPPV. I called her daughter, Britt, who states she last saw patient 2 days ago and appeared to be at her normal state. States patient lives with her brother Alli. I called Alli. He states patient has not been doing well lately. He adds that she has been "out of it", "falls asleep on her chair", "can't get up", forgetful and an episode of nose bleed. Also mentioned she might be short of breath despite using home O2. He cannot recall all of her meds but will get back to us once he has her list. He is worried about her when on her own as he works. Per chart review, patient was initiated on Xarelto likely for stroke prophylaxis for AFib. Apparently patient stopped taking it Xarelto due to an episode of nose bleed and only taking aspirin.     Per ED staff, EMS reported saturation of 70% on arrival.  She was placed on CPAP and transported to our emergency department.  On arrival, patient was tachypneic.  Placed on BiPAP and given a dose of IV furosemide.  Patient nods yes to feeling a little better but not ready to come off BiPAP.  Admitted to ICU for further management.  "

## 2020-06-25 NOTE — NURSING TRANSFER
Nursing Transfer Note      6/25/2020     Transfer From: ED    Transfer via stretcher    Transfer with O2 ON BIPAP, cardiac monitoring    Transported by RN AND RT    Medicines sent: NONE    Chart send with patient: Yes      Upon arrival to floor: cardiac monitor applied, Sats 70% on Fio2 30% BIPAP, MD called to bedside, RT changed mask pt currently on 45% Fio2 18/5 sats >90%. Pt very lethargic, required sternal rub to arouse, disoriented to place/time/situation. MD notified. Pt is currently arousable to voice. Plan for ABG @ 1800, abg reviewed, adjustmenets made per MD. Anderson in place, clear yellow urine, adequate amount noted. Patient oriented to room, call bell in reach and bed in lowest position

## 2020-06-26 NOTE — HPI
Ms. Mancini is a 70 yr old female with PMH of severe COPD (follows Dr. Clinton) on home O2, morbid obesity, DMII and a-fib ( rcently stopped xarelto following nose bleed) was admitted to ICU for need of continuous BIPAP related to hypercapnea and somnolence. She presented to ED on 06/25 with shortness of breath and lower extremity edema. On admit her CO2 was 99 per ABG. She was placed on BIPAP and started on lasix BID.

## 2020-06-26 NOTE — ASSESSMENT & PLAN NOTE
Classified as severe by her pulmonologist  We do not have thelliot in our facility  Doing nebulized treatments. Steroids dc'd

## 2020-06-26 NOTE — NURSING
Patient very drowsy, unable to speak but does open eyes when ask to. Patient is confused. Tolerating BIPAP with no distress noted. See flow sheet for further assessment.

## 2020-06-26 NOTE — NURSING
Patient has a big blister on right lower leg that burst but it fills right back up with clear fluid. Applied 4x4, and kerlix.  Left leg has different area on leg with small scab noted.

## 2020-06-26 NOTE — SUBJECTIVE & OBJECTIVE
Past Medical History:   Diagnosis Date    Articular gout     Atrial fibrillation     Carotid artery occlusion     CHF (congestive heart failure)     COPD (chronic obstructive pulmonary disease)     Diabetes mellitus, type 2     Hyperlipidemia     Hypertension     Osteopetrosis     Osteoporosis     Sleep apnea        Past Surgical History:   Procedure Laterality Date    CHOLECYSTECTOMY      HYSTERECTOMY      MINERVA equivalent       Review of patient's allergies indicates:   Allergen Reactions    Tylenol [acetaminophen] Anaphylaxis    Hydrocodone-acetaminophen Itching    Levofloxacin Itching       Family History     Problem Relation (Age of Onset)    Cancer Father, Sister    Heart disease Mother        Tobacco Use    Smoking status: Former Smoker    Smokeless tobacco: Never Used   Substance and Sexual Activity    Alcohol use: No    Drug use: No    Sexual activity: Not on file         Review of Systems   Constitutional: Positive for fatigue. Negative for chills, diaphoresis and fever.   HENT: Negative for sinus pressure, sneezing, sore throat and trouble swallowing.    Respiratory: Positive for cough (productive), chest tightness and shortness of breath. Negative for wheezing.    Cardiovascular: Positive for leg swelling. Negative for chest pain.   Gastrointestinal: Negative for diarrhea, nausea and vomiting.   Neurological: Positive for weakness.     Objective:     Vital Signs (Most Recent):  Temp: 98.5 °F (36.9 °C) (06/26/20 0700)  Pulse: 93 (06/26/20 1300)  Resp: (!) 21 (06/26/20 1241)  BP: (!) 149/115 (06/26/20 1300)  SpO2: (!) 93 % (06/26/20 1300) Vital Signs (24h Range):  Temp:  [98.2 °F (36.8 °C)-98.7 °F (37.1 °C)] 98.5 °F (36.9 °C)  Pulse:  [] 93  Resp:  [14-37] 21  SpO2:  [90 %-99 %] 93 %  BP: (113-174)/() 149/115     Weight: 132.9 kg (292 lb 15.9 oz)  Body mass index is 47.29 kg/m².      Intake/Output Summary (Last 24 hours) at 6/26/2020 9456  Last data filed at 6/26/2020  1226  Gross per 24 hour   Intake 450 ml   Output 3790 ml   Net -3340 ml       Physical Exam  Vitals signs and nursing note reviewed.   Constitutional:       General: She is not in acute distress.     Appearance: Normal appearance. She is morbidly obese. She is ill-appearing. She is not diaphoretic.      Interventions: Face mask in place.   HENT:      Head: Normocephalic and atraumatic.   Eyes:      General: No scleral icterus.     Extraocular Movements:      Right eye: No nystagmus.      Left eye: No nystagmus.      Conjunctiva/sclera: Conjunctivae normal.      Right eye: No exudate or hemorrhage.     Left eye: No exudate or hemorrhage.     Pupils: Pupils are equal, round, and reactive to light.   Neck:      Musculoskeletal: Neck supple. No neck rigidity.      Trachea: Trachea normal. No tracheal deviation.   Cardiovascular:      Rate and Rhythm: Normal rate and regular rhythm.      Chest Wall: PMI is not displaced.      Pulses:           Radial pulses are 2+ on the right side and 2+ on the left side.        Dorsalis pedis pulses are 1+ on the right side and 1+ on the left side.      Heart sounds: Normal heart sounds. No murmur. No friction rub. No gallop.       Comments: Warm extremities  Pulmonary:      Effort: Pulmonary effort is normal. Tachypnea and prolonged expiration present. No accessory muscle usage or respiratory distress.      Breath sounds: No stridor. Rales present. No wheezing or rhonchi.      Comments: On BiPAP  Abdominal:      General: Abdomen is protuberant. Bowel sounds are normal. There is no distension.      Palpations: Abdomen is soft.      Tenderness: There is no abdominal tenderness.      Comments: Pitting edema to panus  reducable umbilical hernia   Genitourinary:     Comments: Anderson catheter in place  Musculoskeletal:         General: Swelling (+ 3, wheeping ) present.      Right lower le+ Pitting Edema present.      Left lower le+ Pitting Edema present.   Skin:     General: Skin is  warm and dry.      Capillary Refill: Capillary refill takes 2 to 3 seconds.      Nails: There is no clubbing.        Comments: Very moist  Pale erythema to medial aspect of thighs   Neurological:      General: No focal deficit present.      Mental Status: She is oriented to person, place, and time and easily aroused. She is lethargic.      GCS: GCS eye subscore is 3. GCS verbal subscore is 5. GCS motor subscore is 6.      Cranial Nerves: No cranial nerve deficit.      Motor: Weakness present.      Comments: Sleepy but easily arousable          Vents:  Oxygen Concentration (%): 65 (06/26/20 1241)    Lines/Drains/Airways     Drain                 Urethral Catheter 06/25/20 1 day          Peripheral Intravenous Line                 Peripheral IV - Single Lumen 06/25/20 1351 18 G Right Antecubital 1 day         Peripheral IV - Single Lumen 06/25/20 20 G Left Antecubital 1 day                Significant Labs:    CBC/Anemia Profile:  Recent Labs   Lab 06/25/20  1351   WBC 6.89   HGB 10.5*   HCT 38.6      MCV 88   RDW 19.4*        Chemistries:  Recent Labs   Lab 06/25/20  1351 06/26/20  0400    141   K 3.8 3.9   CL 91* 89*   CO2 43* 47*   BUN 21 16   CREATININE 0.8 0.7   CALCIUM 9.2 8.9   ALBUMIN 3.4* 2.9*   PROT 7.4 6.5   BILITOT 0.6 0.6   ALKPHOS 142* 128   ALT 33 28   AST 35 27   MG 1.6  --        All pertinent labs within the past 24 hours have been reviewed.    Significant Imaging:   I have reviewed all pertinent imaging results/findings within the past 24 hours.

## 2020-06-26 NOTE — PLAN OF CARE
06/26/20 1454   Discharge Assessment   Assessment Type Discharge Planning Assessment   Confirmed/corrected address and phone number on facesheet? Yes   Assessment information obtained from? Caregiver   Prior to hospitilization cognitive status: Alert/Oriented   Prior to hospitalization functional status: Assistive Equipment;Needs Assistance   Current cognitive status: Alert/Oriented   Current Functional Status: Assistive Equipment;Needs Assistance   Facility Arrived From: home   Lives With child(nora), adult   Able to Return to Prior Arrangements yes   Is patient able to care for self after discharge? Unable to determine at this time (comments)   Who are your caregiver(s) and their phone number(s)? Alli Wilkins [phone]   Patient's perception of discharge disposition nursing home   Readmission Within the Last 30 Days no previous admission in last 30 days   Patient currently being followed by outpatient case management? No   Patient currently receives any other outside agency services? Yes   Name and contact number of agency or person providing outside services Nurse   Is it the patient/care giver preference to resume care with the current outside agency? Yes   Equipment Currently Used at Home walker, rolling;cane, quad;oxygen;shower chair   Do you have any problems affording any of your prescribed medications? No   Is the patient taking medications as prescribed? yes   Does the patient have transportation home? Yes   Transportation Anticipated family or friend will provide   Dialysis Name and Scheduled days N/A   Does the patient receive services at the [facility]? No   Discharge Plan A New Nursing Home placement - group home care facility   Discharge Plan B Home with family;Home Health   DME Needed Upon Discharge  none   Patient/Family in Agreement with Plan yes       SW spoke with pt son Alli, he stated he really think his mother should be discharged to a Nursing Home in [place] or [place] with her  . AIME will e-mail kat pickens a list of Nursing Homes at [email or web address]

## 2020-06-26 NOTE — NURSING
Patient is awake, asking questions about her hospitalization. Patient is more oriented. She is tolerating her BIPAP.

## 2020-06-26 NOTE — ASSESSMENT & PLAN NOTE
With hypoxia and hypercapnea. Suspect secondary to severe volume overload as she is edematous and CXR with interstitial congestion. Has history of COPD; however, do not suspect exacerbation at this time (no leukocytosis, clear thin sputum, no fevers). CO2 99 on admit ABG so she was placed on BiPAP 18/5 and 30% FiO2. Her mask was changed to full face as there was a substantial air leak and O2 sats were falling. She was transitioned to Vapotherm the morning of 06/26 after diuresing 3L from lasix and is tolerating well. CO2 improved to 77 and she is AAOx3    --wean Vapotherm for sats 88-92%, BiPAP qHS  --continue Lasix for aggressive diuresis  --wean steroids to daily  --if fevers leukocytosis develops send sputum culture  --ABG prn    She has made it very clear that she does not want to be intubated. Patient is DNR

## 2020-06-26 NOTE — PROGRESS NOTES
SW provided pt with a list of Nursing homes per son request, pt asked SW to contact her daughter in law Rajani Wilkins at [phone] about an assisted living close to her home in [place]. AIME attempted to contact pt daughter in Law, no answer, AIME left a voicemail.

## 2020-06-26 NOTE — ASSESSMENT & PLAN NOTE
Known severe COPD on home O2  Hypercapnia but with renal compensation and acceptable pH  Very alert and conversational today. Also very appropriate  Oxygenation is biggest issue  Agree with vapotherm for goal O2 sats 88-92% and BiPAP nightly  Continue IV diuresis as she is evidently volume overload  D-dimer + LE US to r/o PE  I will discuss code status with her as well  Pulm on board for co-management

## 2020-06-26 NOTE — CONSULTS
[facility]  Pulmonology  Consult Note    Patient Name: Johanna Mancini  MRN: [id number]  Admission Date: 6/25/2020  Hospital Length of Stay: 1 days  Code Status: DNR  Attending Physician: Bee Lopez MD  Primary Care Provider: Johnson Mcintosh MD   Principal Problem: Acute on chronic respiratory failure    Inpatient consult to Pulmonology  Consult performed by: Donnie Tidwell, NP  Consult ordered by: Bee Lopez MD        Subjective:     HPI:  Ms. Mancini is a 70 yr old female with PMH of severe COPD (follows Dr. Clinton) on home O2, morbid obesity, DMII and a-fib ( rcently stopped xarelto following nose bleed) was admitted to ICU for need of continuous BIPAP related to hypercapnea and somnolence. She presented to ED on 06/25 with shortness of breath and lower extremity edema. On admit her CO2 was 99 per ABG. She was placed on BIPAP and started on lasix BID.    Past Medical History:   Diagnosis Date    Articular gout     Atrial fibrillation     Carotid artery occlusion     CHF (congestive heart failure)     COPD (chronic obstructive pulmonary disease)     Diabetes mellitus, type 2     Hyperlipidemia     Hypertension     Osteopetrosis     Osteoporosis     Sleep apnea        Past Surgical History:   Procedure Laterality Date    CHOLECYSTECTOMY      HYSTERECTOMY      MINERVA equivalent       Review of patient's allergies indicates:   Allergen Reactions    Tylenol [acetaminophen] Anaphylaxis    Hydrocodone-acetaminophen Itching    Levofloxacin Itching       Family History     Problem Relation (Age of Onset)    Cancer Father, Sister    Heart disease Mother        Tobacco Use    Smoking status: Former Smoker    Smokeless tobacco: Never Used   Substance and Sexual Activity    Alcohol use: No    Drug use: No    Sexual activity: Not on file         Review of Systems   Constitutional: Positive for fatigue. Negative for chills, diaphoresis and fever.   HENT: Negative for sinus pressure,  sneezing, sore throat and trouble swallowing.    Respiratory: Positive for cough (productive), chest tightness and shortness of breath. Negative for wheezing.    Cardiovascular: Positive for leg swelling. Negative for chest pain.   Gastrointestinal: Negative for diarrhea, nausea and vomiting.   Neurological: Positive for weakness.     Objective:     Vital Signs (Most Recent):  Temp: 98.5 °F (36.9 °C) (06/26/20 0700)  Pulse: 93 (06/26/20 1300)  Resp: (!) 21 (06/26/20 1241)  BP: (!) 149/115 (06/26/20 1300)  SpO2: (!) 93 % (06/26/20 1300) Vital Signs (24h Range):  Temp:  [98.2 °F (36.8 °C)-98.7 °F (37.1 °C)] 98.5 °F (36.9 °C)  Pulse:  [] 93  Resp:  [14-37] 21  SpO2:  [90 %-99 %] 93 %  BP: (113-174)/() 149/115     Weight: 132.9 kg (292 lb 15.9 oz)  Body mass index is 47.29 kg/m².      Intake/Output Summary (Last 24 hours) at 6/26/2020 1443  Last data filed at 6/26/2020 1226  Gross per 24 hour   Intake 450 ml   Output 3790 ml   Net -3340 ml       Physical Exam  Vitals signs and nursing note reviewed.   Constitutional:       General: She is not in acute distress.     Appearance: Normal appearance. She is morbidly obese. She is ill-appearing. She is not diaphoretic.      Interventions: Face mask in place.   HENT:      Head: Normocephalic and atraumatic.   Eyes:      General: No scleral icterus.     Extraocular Movements:      Right eye: No nystagmus.      Left eye: No nystagmus.      Conjunctiva/sclera: Conjunctivae normal.      Right eye: No exudate or hemorrhage.     Left eye: No exudate or hemorrhage.     Pupils: Pupils are equal, round, and reactive to light.   Neck:      Musculoskeletal: Neck supple. No neck rigidity.      Trachea: Trachea normal. No tracheal deviation.   Cardiovascular:      Rate and Rhythm: Normal rate and regular rhythm.      Chest Wall: PMI is not displaced.      Pulses:           Radial pulses are 2+ on the right side and 2+ on the left side.        Dorsalis pedis pulses are 1+ on the  right side and 1+ on the left side.      Heart sounds: Normal heart sounds. No murmur. No friction rub. No gallop.       Comments: Warm extremities  Pulmonary:      Effort: Pulmonary effort is normal. Tachypnea and prolonged expiration present. No accessory muscle usage or respiratory distress.      Breath sounds: No stridor. Rales present. No wheezing or rhonchi.      Comments: On BiPAP  Abdominal:      General: Abdomen is protuberant. Bowel sounds are normal. There is no distension.      Palpations: Abdomen is soft.      Tenderness: There is no abdominal tenderness.      Comments: Pitting edema to panus  reducable umbilical hernia   Genitourinary:     Comments: Anderson catheter in place  Musculoskeletal:         General: Swelling (+ 3, wheeping ) present.      Right lower le+ Pitting Edema present.      Left lower le+ Pitting Edema present.   Skin:     General: Skin is warm and dry.      Capillary Refill: Capillary refill takes 2 to 3 seconds.      Nails: There is no clubbing.        Comments: Very moist  Pale erythema to medial aspect of thighs   Neurological:      General: No focal deficit present.      Mental Status: She is oriented to person, place, and time and easily aroused. She is lethargic.      GCS: GCS eye subscore is 3. GCS verbal subscore is 5. GCS motor subscore is 6.      Cranial Nerves: No cranial nerve deficit.      Motor: Weakness present.      Comments: Sleepy but easily arousable          Vents:  Oxygen Concentration (%): 65 (20 1241)    Lines/Drains/Airways     Drain                 Urethral Catheter 20 1 day          Peripheral Intravenous Line                 Peripheral IV - Single Lumen 20 1351 18 G Right Antecubital 1 day         Peripheral IV - Single Lumen 20 20 G Left Antecubital 1 day                Significant Labs:    CBC/Anemia Profile:  Recent Labs   Lab 20  1351   WBC 6.89   HGB 10.5*   HCT 38.6      MCV 88   RDW 19.4*         Chemistries:  Recent Labs   Lab 06/25/20  1351 06/26/20  0400    141   K 3.8 3.9   CL 91* 89*   CO2 43* 47*   BUN 21 16   CREATININE 0.8 0.7   CALCIUM 9.2 8.9   ALBUMIN 3.4* 2.9*   PROT 7.4 6.5   BILITOT 0.6 0.6   ALKPHOS 142* 128   ALT 33 28   AST 35 27   MG 1.6  --        All pertinent labs within the past 24 hours have been reviewed.    Significant Imaging:   I have reviewed all pertinent imaging results/findings within the past 24 hours.    Assessment/Plan:     * Acute on chronic respiratory failure  With hypoxia and hypercapnea. Suspect secondary to severe volume overload as she is edematous and CXR with interstitial congestion. Has history of COPD; however, do not suspect exacerbation at this time (no leukocytosis, clear thin sputum, no fevers). CO2 99 on admit ABG so she was placed on BiPAP 18/5 and 30% FiO2. Her mask was changed to full face as there was a substantial air leak and O2 sats were falling. She was transitioned to Vapotherm the morning of 06/26 after diuresing 3L from lasix and is tolerating well. CO2 improved to 77 and she is AAOx3    --follow up LE u/s  --wean Vapotherm for sats 88-92%, BiPAP qHS  --continue Lasix for aggressive diuresis  --d/c steroids  --if fevers leukocytosis develops send sputum culture  --ABG prn    She has made it very clear that she does not want to be intubated. Patient is DNR    Hypoxemia requiring supplemental oxygen  Chronic as she is on 4L home O2  See acute respiratory failure    COPD (chronic obstructive pulmonary disease)  On home O2 see acute resp failure    PPI ppx: famotidine   VTE ppx: heparin sq    Above plan discussed with Dr. Michael    Critical Care time 55 minutes    Critical care was time spent personally by me on the following activities: evaluating this patient's organ dysfunction, development of treatment plan, discussing treatment plan with patient or surrogate and bedside caregivers, discussions with consultants, evaluation of patient's  response to treatment, examination of patient, ordering and performing treatments and interventions, ordering and review of laboratory studies, ordering and review of radiographic studies, re-evaluation of patient's condition. This critical care time did not overlap with that of any other provider or involve time for any procedures.        Thank you for your consult. I will follow-up with patient. Please contact us if you have any additional questions.     Donnie Tidwell NP  Pulmonology  [facility]

## 2020-06-26 NOTE — PROGRESS NOTES
"[facility] Hospital Medicine  Progress Note    Patient Name: Johanna Mancini  MRN: [id number]  Patient Class: IP- Inpatient   Admission Date: 6/25/2020  Length of Stay: 1 days  Attending Physician: Bee Lopez MD  Primary Care Provider: Johnson Mcintosh MD        Subjective:     Principal Problem:Acute on chronic respiratory failure        HPI:  70 year old female with chronic obstructive pulmonary disease, chronic respiratory failure on home O2, obesity, diabetes mellitus type 2 and atrial fibrillation who presents from home with shortness of breath and lower extremity edema.  Patient unable to provide history at this time as she is somnolent and on NIPPV. I called her daughter, Britt, who states she last saw patient 2 days ago and appeared to be at her normal state. States patient lives with her brother Alli. I called Alli. He states patient has not been doing well lately. He adds that she has been "out of it", "falls asleep on her chair", "can't get up", forgetful and an episode of nose bleed. Also mentioned she might be short of breath despite using home O2. He cannot recall all of her meds but will get back to us once he has her list. He is worried about her when on her own as he works. Per chart review, patient was initiated on Xarelto likely for stroke prophylaxis for AFib. Apparently patient stopped taking it Xarelto due to an episode of nose bleed and only taking aspirin.     Per ED staff, EMS reported saturation of 70% on arrival.  She was placed on CPAP and transported to our emergency department.  On arrival, patient was tachypneic.  Placed on BiPAP and given a dose of IV furosemide.  Patient nods yes to feeling a little better but not ready to come off BiPAP.  Admitted to ICU for further management.    Overview/Hospital Course:  No notes on file    Interval History: feels better today. On high flow-type nasal cannula    Review of Systems   Constitutional: Negative.    Respiratory: " Negative.    Cardiovascular: Positive for leg swelling.   Gastrointestinal: Negative.      Objective:     Vital Signs (Most Recent):  Temp: 98.5 °F (36.9 °C) (06/26/20 0700)  Pulse: 98 (06/26/20 1500)  Resp: (!) 39 (06/26/20 1500)  BP: 137/64 (06/26/20 1500)  SpO2: (!) 88 % (06/26/20 1500) Vital Signs (24h Range):  Temp:  [98.2 °F (36.8 °C)-98.7 °F (37.1 °C)] 98.5 °F (36.9 °C)  Pulse:  [] 98  Resp:  [14-39] 39  SpO2:  [88 %-99 %] 88 %  BP: (113-174)/() 137/64     Weight: 132.9 kg (292 lb 15.9 oz)  Body mass index is 47.29 kg/m².    Intake/Output Summary (Last 24 hours) at 6/26/2020 1659  Last data filed at 6/26/2020 1500  Gross per 24 hour   Intake 450 ml   Output 2630 ml   Net -2180 ml      Physical Exam  Vitals signs and nursing note reviewed.   Constitutional:       Appearance: She is obese. She is not ill-appearing.   HENT:      Head: Normocephalic.   Cardiovascular:      Rate and Rhythm: Normal rate and regular rhythm.   Pulmonary:      Effort: Pulmonary effort is normal.      Breath sounds: Rales present. No wheezing.      Comments: On high flow-type NC  Distant breath sounds  Abdominal:      Comments: Pitting edema to lower quadrants and panus  Hernia that is soft and reducible    Musculoskeletal:         General: Swelling (+ 3, wheeping ) present.   Skin:     Capillary Refill: Capillary refill takes 2 to 3 seconds.      Comments: Very moist  Pale erythema to medial aspect of thighs   Neurological:      Mental Status: She is alert and oriented to person, place, and time.         Significant Labs: All pertinent labs within the past 24 hours have been reviewed.    Significant Imaging: I have reviewed all pertinent imaging results/findings within the past 24 hours.  I have reviewed and interpreted all pertinent imaging results/findings within the past 24 hours.      Assessment/Plan:      * Acute on chronic respiratory failure  Known severe COPD on home O2  Hypercapnia but with renal compensation and  acceptable pH  Very alert and conversational today. Also very appropriate  Oxygenation is biggest issue  Agree with vapotherm for goal O2 sats 88-92% and BiPAP nightly  Continue IV diuresis as she is evidently volume overload  D-dimer + LE US to r/o PE  I will discuss code status with her as well  Pulm on board for co-management          Chronic respiratory failure with hypoxia, on home O2 therapy  as above      Other persistent atrial fibrillation  Rate controlled  Resume oral diltiazem  Patient not interested in resuming anticoagulation as she has experienced epistaxis and rectal bleed (not severe; 2/2 hemorrhoids). Aware of high risk for stroke  Will do aspirin in the meantime  Cardiac monitoring.  Reviewed electrolytes      Type 2 diabetes mellitus without complication, without long-term current use of insulin  Hemoglobin A1c pending  Hold metformin  Start insulin regimen and adjust as needed for goal glucose [id number]      Hypertension, essential  Blood pressure high on arrival however patient was in respiratory distress  Address respiratory issues and resume diltiazem  Will add antihypertensives if needed      COPD (chronic obstructive pulmonary disease)  Classified as severe by her pulmonologist  We do not have thophylline in our facility  Doing nebulized treatments. Steroids dc'd        VTE Risk Mitigation (From admission, onward)         Ordered     heparin (porcine) injection 5,000 Units  Every 12 hours      06/25/20 1711     IP VTE HIGH RISK PATIENT  Once      06/25/20 1614     Place sequential compression device  Until discontinued      06/25/20 1614                Discussed with patient and son (over the phone). Called daughter but no answer. Left voice message.     Critical care time spent on the evaluation and treatment of severe organ dysfunction, review of pertinent labs and imaging studies, discussions with consulting providers and discussions with patient/family: > 35 minutes.      Bee Khan  MD  Department of Hospital Medicine   [facility]

## 2020-06-26 NOTE — SUBJECTIVE & OBJECTIVE
Interval History: feels better today. On high flow-type nasal cannula    Review of Systems   Constitutional: Negative.    Respiratory: Negative.    Cardiovascular: Positive for leg swelling.   Gastrointestinal: Negative.      Objective:     Vital Signs (Most Recent):  Temp: 98.5 °F (36.9 °C) (06/26/20 0700)  Pulse: 98 (06/26/20 1500)  Resp: (!) 39 (06/26/20 1500)  BP: 137/64 (06/26/20 1500)  SpO2: (!) 88 % (06/26/20 1500) Vital Signs (24h Range):  Temp:  [98.2 °F (36.8 °C)-98.7 °F (37.1 °C)] 98.5 °F (36.9 °C)  Pulse:  [] 98  Resp:  [14-39] 39  SpO2:  [88 %-99 %] 88 %  BP: (113-174)/() 137/64     Weight: 132.9 kg (292 lb 15.9 oz)  Body mass index is 47.29 kg/m².    Intake/Output Summary (Last 24 hours) at 6/26/2020 1659  Last data filed at 6/26/2020 1500  Gross per 24 hour   Intake 450 ml   Output 2630 ml   Net -2180 ml      Physical Exam  Vitals signs and nursing note reviewed.   Constitutional:       Appearance: She is obese. She is not ill-appearing.   HENT:      Head: Normocephalic.   Cardiovascular:      Rate and Rhythm: Normal rate and regular rhythm.   Pulmonary:      Effort: Pulmonary effort is normal.      Breath sounds: Rales present. No wheezing.      Comments: On high flow-type NC  Distant breath sounds  Abdominal:      Comments: Pitting edema to lower quadrants and panus  Hernia that is soft and reducible    Musculoskeletal:         General: Swelling (+ 3, wheeping ) present.   Skin:     Capillary Refill: Capillary refill takes 2 to 3 seconds.      Comments: Very moist  Pale erythema to medial aspect of thighs   Neurological:      Mental Status: She is alert and oriented to person, place, and time.         Significant Labs: All pertinent labs within the past 24 hours have been reviewed.    Significant Imaging: I have reviewed all pertinent imaging results/findings within the past 24 hours.  I have reviewed and interpreted all pertinent imaging results/findings within the past 24 hours.

## 2020-06-26 NOTE — PLAN OF CARE
Pt received on BiPAP with the following settings:  18/5 RR 14 FI02 40%  Pt frequently removes mask; RT placed Pt on Vapotherm FI02 65%  35 lpm Pt stable without distress.  RT will continue to monitor and titrate as tolerated.

## 2020-06-26 NOTE — ASSESSMENT & PLAN NOTE
Rate controlled  Resume oral diltiazem  Patient not interested in resuming anticoagulation as she has experienced epistaxis and rectal bleed (not severe; 2/2 hemorrhoids). Aware of high risk for stroke  Will do aspirin in the meantime  Cardiac monitoring.  Reviewed electrolytes

## 2020-06-26 NOTE — ASSESSMENT & PLAN NOTE
With hypoxia and hypercapnea. Suspect secondary to severe volume overload as she is edematous and CXR with interstitial congestion. Has history of COPD; however, do not suspect exacerbation at this time (no leukocytosis, clear thin sputum, no fevers). CO2 99 on admit ABG so she was placed on BiPAP 18/5 and 30% FiO2. Her mask was changed to full face as there was a substantial air leak and O2 sats were falling. She was transitioned to Vapotherm the morning of 06/26 after diuresing 3L from lasix and is tolerating well. CO2 improved to 77 and she is AAOx3    --follow up LE u/s  --wean Vapotherm for sats 88-92%, BiPAP qHS  --continue Lasix for aggressive diuresis  --wean steroids to daily  --if fevers leukocytosis develops send sputum culture  --ABG prn    She has made it very clear that she does not want to be intubated. Patient is DNR

## 2020-06-27 PROBLEM — Z71.89 DNR (DO NOT RESUSCITATE) DISCUSSION: Status: ACTIVE | Noted: 2020-01-01

## 2020-06-27 NOTE — ASSESSMENT & PLAN NOTE
Hemoglobin A1c pending  Hold metformin  Insulin regimen and adjust as needed for goal glucose 140-180

## 2020-06-27 NOTE — ASSESSMENT & PLAN NOTE
I spent > 16 min discussing code status with patient.  Patient states she has had discussions with several for providers regarding respiratory failure.  She states she has been explained by all of these providers that if ever intubated would be very difficult to wean off ventilator.  Patient states that she is very aware of how bad her COPD is and is accepting of her condition.  States that she would not undergo any measures that would prolong suffering or cause harm.  This includes chest compressions, intubation, shock and drug protocol.  Will change code status to DNR.  Patient's son, Alli, is aware.

## 2020-06-27 NOTE — CARE UPDATE
[facility]  ICU Shift Summary  Date: 6/27/2020      COVID Test: (--)  Isolation: No active isolations     Prehospitalization: Home  Admit Date / LOS : 6/25/2020/ 2 days    Diagnosis: Acute on chronic respiratory failure    Consults:        Active: Pulm CC       Needed: OT, PT and SW     Code Status: DNR   Advanced Directive: <no information>    LDA: Anderson       Central Lines/Site/Justification:Patient Does Not Have Central Line       Urinary Cath/Order/Justification:Critically Ill in the ICU and requiring intensive monitoring    Vasopressors/Infusions:        GOALS: Volume/ Hemodynamic: N/A                     RASS: 0  alert and calm    Pain Management: PO       Pain Controlled: yes     Rhythm: A-Fib    Respiratory Device: Vapotherm    Oxygen Concentration (%):  [30-65] 50             Most Recent SBT/ SAT: N/A       MOVE Screen: PT Consult    VTE Prophylaxis: Pharm  Mobility: Bedrest and A: Assist  Stress Ulcer Prophylaxis: Yes    Dietary: PO  Tolerance: yes  /  Advancement: @ goal    I & O (24h):    Intake/Output Summary (Last 24 hours) at 6/27/2020 1724  Last data filed at 6/27/2020 1700  Gross per 24 hour   Intake 1270 ml   Output 1440 ml   Net -170 ml        Restraints: No    Significant Dates:  Post Op Date: N/A  Rescue Date: N/A  Imaging/ Diagnostics: N/A    Noteworthy Labs:  DDimer elevated    CBC/Anemia Labs: Coags:    Recent Labs   Lab 06/25/20  1351   WBC 6.89   HGB 10.5*   HCT 38.6      MCV 88   RDW 19.4*    Recent Labs   Lab 06/25/20  1351   INR 1.1   APTT 24.5        Chemistries:   Recent Labs   Lab 06/25/20  1351 06/26/20  0400 06/27/20  0343    141 139   K 3.8 3.9 4.5   CL 91* 89* 89*   CO2 43* 47* 39*   BUN 21 16 30*   CREATININE 0.8 0.7 0.8   CALCIUM 9.2 8.9 8.9   PROT 7.4 6.5 6.3   BILITOT 0.6 0.6 0.4   ALKPHOS 142* 128 115   ALT 33 28 20   AST 35 27 25   MG 1.6  --   --         Cardiac Enzymes: Ejection Fractions:    Recent Labs     06/25/20 2038 06/26/20  0041    TROPONINI 0.040* 0.030*    Nuc Stress EF   Date Value Ref Range Status   10/15/2018 73 % Final        POCT Glucose: HbA1c:    Recent Labs   Lab 06/27/20  0734 06/27/20  1234 06/27/20  1635   POCTGLUCOSE 138* 128* 166*    Hemoglobin A1C   Date Value Ref Range Status   06/26/2020 6.1 (H) 4.0 - 5.6 % Final     Comment:     ADA Screening Guidelines:  5.7-6.4%  Consistent with prediabetes  >or=6.5%  Consistent with diabetes  High levels of fetal hemoglobin interfere with the HbA1C  assay. Heterozygous hemoglobin variants (HbS, HgC, etc)do  not significantly interfere with this assay.   However, presence of multiple variants may affect accuracy.             ICU LOS 2d  Level of Care: Critical Care    Shift Summary/Plan for the shift: SEE CARE PLAN NOTE

## 2020-06-27 NOTE — PROGRESS NOTES
"[facility] Hospital Medicine  Progress Note    Patient Name: Johanna Mancini  MRN: [id number]  Patient Class: IP- Inpatient   Admission Date: 6/25/2020  Length of Stay: 2 days  Attending Physician: Bee Lopez MD  Primary Care Provider: Johnson Mcintosh MD        Subjective:     Principal Problem:Acute on chronic respiratory failure        HPI:  70 year old female with chronic obstructive pulmonary disease, chronic respiratory failure on home O2, obesity, diabetes mellitus type 2 and atrial fibrillation who presents from home with shortness of breath and lower extremity edema.  Patient unable to provide history at this time as she is somnolent and on NIPPV. I called her daughter, Britt, who states she last saw patient 2 days ago and appeared to be at her normal state. States patient lives with her brother Alli. I called Alli. He states patient has not been doing well lately. He adds that she has been "out of it", "falls asleep on her chair", "can't get up", forgetful and an episode of nose bleed. Also mentioned she might be short of breath despite using home O2. He cannot recall all of her meds but will get back to us once he has her list. He is worried about her when on her own as he works. Per chart review, patient was initiated on Xarelto likely for stroke prophylaxis for AFib. Apparently patient stopped taking it Xarelto due to an episode of nose bleed and only taking aspirin.     Per ED staff, EMS reported saturation of 70% on arrival.  She was placed on CPAP and transported to our emergency department.  On arrival, patient was tachypneic.  Placed on BiPAP and given a dose of IV furosemide.  Patient nods yes to feeling a little better but not ready to come off BiPAP.  Admitted to ICU for further management.    Overview/Hospital Course:  No notes on file    Interval History:  Looks better and feels better.  Still on Vapotherm but on less O2 requirements.  Renal function is good.  D-dimer " is high.  Lower extremity ultrasound negative for DVT.    Review of Systems   Constitutional: Negative.    Respiratory: Negative.    Cardiovascular: Positive for leg swelling.   Gastrointestinal: Negative.      Objective:     Vital Signs (Most Recent):  Temp: 98.1 °F (36.7 °C) (06/27/20 1200)  Pulse: 85 (06/27/20 1445)  Resp: (!) 21 (06/27/20 1445)  BP: 124/68 (06/27/20 1400)  SpO2: (!) 92 % (06/27/20 1445) Vital Signs (24h Range):  Temp:  [97.8 °F (36.6 °C)-98.7 °F (37.1 °C)] 98.1 °F (36.7 °C)  Pulse:  [] 85  Resp:  [0-35] 21  SpO2:  [86 %-98 %] 92 %  BP: (124-153)/(60-83) 124/68     Weight: 132.9 kg (292 lb 15.9 oz)  Body mass index is 47.29 kg/m².    Intake/Output Summary (Last 24 hours) at 6/27/2020 1526  Last data filed at 6/27/2020 1200  Gross per 24 hour   Intake 1270 ml   Output 1510 ml   Net -240 ml      Physical Exam  Vitals signs and nursing note reviewed.   Constitutional:       Appearance: Normal appearance. She is obese. She is not ill-appearing.   Cardiovascular:      Rate and Rhythm: Normal rate and regular rhythm.   Pulmonary:      Effort: Pulmonary effort is normal.      Breath sounds: Rales present. No wheezing.      Comments: On high flow-type NC  Distant breath sounds  Abdominal:      Comments: Pitting edema to lower quadrants and panus  Hernia that is soft and reducible    Musculoskeletal:         General: Swelling (+ 3 up to abdomen) present.   Skin:     Capillary Refill: Capillary refill takes 2 to 3 seconds.      Findings: No erythema.      Comments: Very moist     Neurological:      Mental Status: She is alert and oriented to person, place, and time.         Significant Labs: All pertinent labs within the past 24 hours have been reviewed.    Significant Imaging: I have reviewed all pertinent imaging results/findings within the past 24 hours.  I have reviewed and interpreted all pertinent imaging results/findings within the past 24 hours.      Assessment/Plan:      * Acute on chronic  respiratory failure  Known severe COPD on home O2  Hypercapnia but with renal compensation and acceptable pH  Very alert and conversational. Also very appropriate  Oxygenation is biggest issue  Agree with vapotherm for goal O2 sats 88-92% and BiPAP nightly  Continue IV diuresis as she is evidently volume overload  D-dimer is elevated. Bilateral lower extremity ultrasound negative for DVT.  Patient has clinically improved but if unable to wean off high-flow type nasal cannula, will obtain CTA of chest to rule out pulmonary embolism.  PT/OT consulted  Pulm on board for co-management          DNR (do not resuscitate) discussion  I spent > 16 min discussing code status with patient.  Patient states she has had discussions with several for providers regarding respiratory failure.  She states she has been explained by all of these providers that if ever intubated would be very difficult to wean off ventilator.  Patient states that she is very aware of how bad her COPD is and is accepting of her condition.  States that she would not undergo any measures that would prolong suffering or cause harm.  This includes chest compressions, intubation, shock and drug protocol.  Will change code status to DNR.  Patient's son, Alli, is aware.      Chronic respiratory failure with hypoxia, on home O2 therapy  as above      Other persistent atrial fibrillation  Rate controlled  Resume oral diltiazem  Patient not interested in resuming anticoagulation as she has experienced epistaxis and rectal bleed (not severe; 2/2 hemorrhoids). Aware of high risk for stroke  Will do aspirin in the meantime  Cardiac monitoring.  Reviewed electrolytes      Type 2 diabetes mellitus without complication, without long-term current use of insulin  Hemoglobin A1c pending  Hold metformin  Insulin regimen and adjust as needed for goal glucose 140-180      Hypertension, essential  Blood pressure high on arrival however patient was in respiratory  distress  Address respiratory issues and resume diltiazem  Will add antihypertensives if needed      COPD (chronic obstructive pulmonary disease)  Classified as severe by her pulmonologist  We do not have thophylline in our facility  Doing nebulized treatments. Steroids dc'd        VTE Risk Mitigation (From admission, onward)         Ordered     heparin (porcine) injection 5,000 Units  Every 12 hours      06/25/20 1711     IP VTE HIGH RISK PATIENT  Once      06/25/20 1614     Place sequential compression device  Until discontinued      06/25/20 1614              Patient's son updated over the phone.  All questions answered to satisfaction.    Critical care time spent on the evaluation and treatment of severe organ dysfunction, review of pertinent labs and imaging studies, discussions with consulting providers and discussions with patient/family: > 45 minutes.      Bee Khan MD  Department of Hospital Medicine   [facility]

## 2020-06-27 NOTE — SUBJECTIVE & OBJECTIVE
Interval History:  Looks better and feels better.  Still on Vapotherm but on less O2 requirements.  Renal function is good.  D-dimer is high.  Lower extremity ultrasound negative for DVT.    Review of Systems   Constitutional: Negative.    Respiratory: Negative.    Cardiovascular: Positive for leg swelling.   Gastrointestinal: Negative.      Objective:     Vital Signs (Most Recent):  Temp: 98.1 °F (36.7 °C) (06/27/20 1200)  Pulse: 85 (06/27/20 1445)  Resp: (!) 21 (06/27/20 1445)  BP: 124/68 (06/27/20 1400)  SpO2: (!) 92 % (06/27/20 1445) Vital Signs (24h Range):  Temp:  [97.8 °F (36.6 °C)-98.7 °F (37.1 °C)] 98.1 °F (36.7 °C)  Pulse:  [] 85  Resp:  [0-35] 21  SpO2:  [86 %-98 %] 92 %  BP: (124-153)/(60-83) 124/68     Weight: 132.9 kg (292 lb 15.9 oz)  Body mass index is 47.29 kg/m².    Intake/Output Summary (Last 24 hours) at 6/27/2020 1526  Last data filed at 6/27/2020 1200  Gross per 24 hour   Intake 1270 ml   Output 1510 ml   Net -240 ml      Physical Exam  Vitals signs and nursing note reviewed.   Constitutional:       Appearance: Normal appearance. She is obese. She is not ill-appearing.   Cardiovascular:      Rate and Rhythm: Normal rate and regular rhythm.   Pulmonary:      Effort: Pulmonary effort is normal.      Breath sounds: Rales present. No wheezing.      Comments: On high flow-type NC  Distant breath sounds  Abdominal:      Comments: Pitting edema to lower quadrants and panus  Hernia that is soft and reducible    Musculoskeletal:         General: Swelling (+ 3 up to abdomen) present.   Skin:     Capillary Refill: Capillary refill takes 2 to 3 seconds.      Findings: No erythema.      Comments: Very moist     Neurological:      Mental Status: She is alert and oriented to person, place, and time.         Significant Labs: All pertinent labs within the past 24 hours have been reviewed.    Significant Imaging: I have reviewed all pertinent imaging results/findings within the past 24 hours.  I have  reviewed and interpreted all pertinent imaging results/findings within the past 24 hours.

## 2020-06-27 NOTE — NURSING
Epic down from 0200 to 0600.   Pt sleeping w/o diff. On Bipap most of night w/o diff. Using oral suction cath. Continued gen edema. Palpable pulse.   Dressing to right lower leg intact. Anderson cath intact. Clear urine. Turned frequently during night. Pt remains free from falls or other injury. Bipap  Changed back to vapotherm per resp.

## 2020-06-27 NOTE — ASSESSMENT & PLAN NOTE
Known severe COPD on home O2  Hypercapnia but with renal compensation and acceptable pH  Very alert and conversational. Also very appropriate  Oxygenation is biggest issue  Agree with vapotherm for goal O2 sats 88-92% and BiPAP nightly  Continue IV diuresis as she is evidently volume overload  D-dimer is elevated. Bilateral lower extremity ultrasound negative for DVT.  Patient has clinically improved but if unable to wean off high-flow type nasal cannula, will obtain CTA of chest to rule out pulmonary embolism.  PT/OT consulted  Pulm on board for co-management

## 2020-06-27 NOTE — PLAN OF CARE
Patient remains in ICU.  On vapotherm 50% and 25 L.  Patient is alert and oriented and in afib.  PO ibuprofen for headache today.  Accu checks not requiring coverage.  Patient sat on side of the bed today x 2.5 hours and OOB with assist to bedside commode.  BM today.  Anderson catheter in place and large amount of clear yellow urine draining.  No fall or injury.  Patient resting in bed now with call bell within reach.  Family at bedside.

## 2020-06-28 NOTE — PLAN OF CARE
Pt currently on Vapotherm @ 25 lpm 50% FI02.  BiPAP on stby.  Pt is without distress eating breakfast.  RT will continue to monitor and titrate as tolerated.

## 2020-06-28 NOTE — PLAN OF CARE
Patient remains in ICU on Vapotherm.  Weaned down to 20L and 45% for most of day, but turned back to 25L and 50%.  Patient is alert and oriented.  Able to participate with PT x2 today and had 2 BM in bedside commode today.  Ate meals sitting at side of bed. Large volume of urine output today in rizo.  Diamox given first dose.  Plan of care reviewed with patient and addressed questions and concerns.

## 2020-06-28 NOTE — PLAN OF CARE
AIME spoke with pt son, Alli Wilkins, about IMM.  Mr. Wilkins reported that he understand the IMM process.  The original IMM will be mailed to pt son address which is [address] [place] [address]. [phone].     06/28/20 5461   Medicare Message   Important Message from Medicare regarding Discharge Appeal Rights Explained to patient/caregiver;Other (comments)   Date IMM was signed 06/28/20   Time IMM was signed 1640

## 2020-06-28 NOTE — CARE UPDATE
[facility]  ICU Shift Summary  Date: 6/28/2020      COVID Test: (--) 6/25  Isolation: No active isolations     Prehospitalization: Home  Admit Date / LOS : 6/25/2020/ 3 days    Diagnosis: Acute on chronic respiratory failure    Consults:        Active: OT, PT, Pulm CC and SW       Needed: N/A     Code Status: DNR   Advanced Directive: <no information>    LDA: BIPAP, Anderson and PIV       Central Lines/Site/Justification:Patient Does Not Have Central Line       Urinary Cath/Order/Justification:Critically Ill in the ICU and requiring intensive monitoring    Vasopressors/Infusions:        GOALS: Volume/ Hemodynamic: N/A                     RASS: 0  alert and calm    Pain Management: none       Pain Controlled: not applicable     Rhythm: NSR    Respiratory Device: Vapotherm and Bipap    Oxygen Concentration (%):  [40-50] 50             Most Recent SBT/ SAT: N/A       MOVE Screen: PT Consult    VTE Prophylaxis: Pharm  Mobility: OOB to Chair and A: Assist  Stress Ulcer Prophylaxis: Yes    Dietary: PO  Tolerance: yes  /  Advancement: @ goal    I & O (24h):    Intake/Output Summary (Last 24 hours) at 6/28/2020 0646  Last data filed at 6/28/2020 0600  Gross per 24 hour   Intake 1125 ml   Output 3300 ml   Net -2175 ml        Restraints: No    Significant Dates:  Post Op Date: N/A  Rescue Date: N/A  Imaging/ Diagnostics: N/A    Noteworthy Labs:  CO2 47    CBC/Anemia Labs: Coags:    Recent Labs   Lab 06/25/20  1351   WBC 6.89   HGB 10.5*   HCT 38.6      MCV 88   RDW 19.4*    Recent Labs   Lab 06/25/20  1351   INR 1.1   APTT 24.5        Chemistries:   Recent Labs   Lab 06/25/20  1351  06/27/20  0343 06/28/20  0228      < > 139 142   K 3.8   < > 4.5 4.2   CL 91*   < > 89* 89*   CO2 43*   < > 39* 47*   BUN 21   < > 30* 31*   CREATININE 0.8   < > 0.8 0.8   CALCIUM 9.2   < > 8.9 8.6*   PROT 7.4   < > 6.3 6.1   BILITOT 0.6   < > 0.4 0.4   ALKPHOS 142*   < > 115 104   ALT 33   < > 20 18   AST 35   < > 25  21   MG 1.6  --   --   --     < > = values in this interval not displayed.        Cardiac Enzymes: Ejection Fractions:    Recent Labs     06/25/20 2038 06/26/20  0041   TROPONINI 0.040* 0.030*    Nuc Stress EF   Date Value Ref Range Status   10/15/2018 73 % Final        POCT Glucose: HbA1c:    Recent Labs   Lab 06/27/20  1234 06/27/20  1635 06/27/20  2129   POCTGLUCOSE 128* 166* 151*    Hemoglobin A1C   Date Value Ref Range Status   06/26/2020 6.1 (H) 4.0 - 5.6 % Final     Comment:     ADA Screening Guidelines:  5.7-6.4%  Consistent with prediabetes  >or=6.5%  Consistent with diabetes  High levels of fetal hemoglobin interfere with the HbA1C  assay. Heterozygous hemoglobin variants (HbS, HgC, etc)do  not significantly interfere with this assay.   However, presence of multiple variants may affect accuracy.             ICU LOS 2d 13h  Level of Care: Critical Care    Shift Summary/Plan for the shift: Patient remains in ICU overnight. Bipap worn for several hours and transitioned to Vapotherm at 0200 per patient request. NSR on cardiac monitor. 2 L UO. No BM. Repositioned frequently. No falls, injury, or skin breakdown.

## 2020-06-28 NOTE — PLAN OF CARE
Problem: Physical Therapy Goal  Goal: Physical Therapy Goal  Description: Goals to be met by: 20     Patient will increase functional independence with mobility by performin. Supine to sit with Stand-by Assistance  2. Sit to supine with Stand-by Assistance  3. Rolling to Left and Right with Modified Spade.  4. Sit to stand transfer with Supervision and RW  5. Gait  x 50 feet with Contact Guard Assistance using Rolling Walker.   6. Wheelchair propulsion x200 feet with Supervision using bilateral uppper extremities    Outcome: Ongoing, Progressing     Pt tolerated physical therapy evaluation well, pt benefits from cuing to breathe through nose to maintain SpO2 levels during activity. Pt would benefit from skilled therapy to improve safety with ambulation/ transfers and maximize independence with functional mobility.

## 2020-06-28 NOTE — PROGRESS NOTES
"[facility] Hospital Medicine  Progress Note    Patient Name: Johanna Mancini  MRN: [id number]  Patient Class: IP- Inpatient   Admission Date: 6/25/2020  Length of Stay: 3 days  Attending Physician: Bee Lopez MD  Primary Care Provider: Johnson Mcintosh MD        Subjective:     Principal Problem:Acute on chronic respiratory failure        HPI:  70 year old female with chronic obstructive pulmonary disease, chronic respiratory failure on home O2, obesity, diabetes mellitus type 2 and atrial fibrillation who presents from home with shortness of breath and lower extremity edema.  Patient unable to provide history at this time as she is somnolent and on NIPPV. I called her daughter, Britt, who states she last saw patient 2 days ago and appeared to be at her normal state. States patient lives with her brother Alli. I called Alli. He states patient has not been doing well lately. He adds that she has been "out of it", "falls asleep on her chair", "can't get up", forgetful and an episode of nose bleed. Also mentioned she might be short of breath despite using home O2. He cannot recall all of her meds but will get back to us once he has her list. He is worried about her when on her own as he works. Per chart review, patient was initiated on Xarelto likely for stroke prophylaxis for AFib. Apparently patient stopped taking it Xarelto due to an episode of nose bleed and only taking aspirin.     Per ED staff, EMS reported saturation of 70% on arrival.  She was placed on CPAP and transported to our emergency department.  On arrival, patient was tachypneic.  Placed on BiPAP and given a dose of IV furosemide.  Patient nods yes to feeling a little better but not ready to come off BiPAP.  Admitted to ICU for further management.    Overview/Hospital Course:  No notes on file    Interval History:  Feeling better every day.  Participated with physical therapy and was able to ambulate short distance using a " walker.  Swelling in oxygenation slowly improving.  Renal function stable.    Review of Systems   Constitutional: Negative.    Respiratory: Negative.    Cardiovascular: Positive for leg swelling.   Gastrointestinal: Negative.    Neurological: Positive for weakness.     Objective:     Vital Signs (Most Recent):  Temp: 97.9 °F (36.6 °C) (06/28/20 0745)  Pulse: 88 (06/28/20 1300)  Resp: (!) 26 (06/28/20 1300)  BP: (!) 143/74 (06/28/20 1300)  SpO2: (!) 88 % (06/28/20 1300) Vital Signs (24h Range):  Temp:  [97.9 °F (36.6 °C)-98.5 °F (36.9 °C)] 97.9 °F (36.6 °C)  Pulse:  [] 88  Resp:  [11-30] 26  SpO2:  [87 %-99 %] 88 %  BP: (116-178)/() 143/74     Weight: 132.9 kg (292 lb 15.9 oz)  Body mass index is 47.29 kg/m².    Intake/Output Summary (Last 24 hours) at 6/28/2020 1407  Last data filed at 6/28/2020 1200  Gross per 24 hour   Intake 1225 ml   Output 5400 ml   Net -4175 ml      Physical Exam  Vitals signs and nursing note reviewed.   Constitutional:       Appearance: Normal appearance. She is obese. She is not ill-appearing.   Cardiovascular:      Rate and Rhythm: Normal rate and regular rhythm.   Pulmonary:      Effort: Pulmonary effort is normal.      Breath sounds: Rales present. No wheezing.      Comments: On Vapotherm  Distant breath sounds  Able to speak full sentences.  Abdominal:      Comments: Pitting edema to lower quadrants and panus  Hernia that is soft and reducible    Musculoskeletal:         General: Swelling (+ 3 up to abdomen, but better) present.   Skin:     General: Skin is warm and dry.      Capillary Refill: Capillary refill takes 2 to 3 seconds.      Findings: No erythema.      Comments:      Neurological:      Mental Status: She is alert and oriented to person, place, and time.         Significant Labs: All pertinent labs within the past 24 hours have been reviewed.    Significant Imaging: I have reviewed all pertinent imaging results/findings within the past 24 hours.  I have reviewed  and interpreted all pertinent imaging results/findings within the past 24 hours.      Assessment/Plan:      * Acute on chronic respiratory failure  Known severe COPD on home O2  Hypercapnia but with renal compensation and acceptable pH  Very alert and conversational. Also very appropriate  Oxygenation is biggest issue  Agree with vapotherm for goal O2 sats 88-92% and BiPAP nightly  Continue IV diuresis as she is evidently volume overload  D-dimer is elevated. Bilateral lower extremity ultrasound negative for DVT.  Patient has clinically improved but if unable to wean off high-flow type nasal cannula, will obtain CTA of chest to rule out pulmonary embolism.  PT/OT consulted. Doing well. Working on residential NH placement in [place] (patient aware and in agreement)  Pulm on board for co-management          DNR (do not resuscitate) discussion  I spent > 16 min discussing code status with patient.  Patient states she has had discussions with several for providers regarding respiratory failure.  She states she has been explained by all of these providers that if ever intubated would be very difficult to wean off ventilator.  Patient states that she is very aware of how bad her COPD is and is accepting of her condition.  States that she would not undergo any measures that would prolong suffering or cause harm.  This includes chest compressions, intubation, shock and drug protocol.  Will change code status to DNR.  Patient's son, Alli, is aware.      Chronic respiratory failure with hypoxia, on home O2 therapy  as above      Other persistent atrial fibrillation  Rate controlled  Resume oral diltiazem  Patient not interested in resuming anticoagulation as she has experienced epistaxis and rectal bleed (not severe; 2/2 hemorrhoids). Aware of high risk for stroke  Will do aspirin in the meantime  Cardiac monitoring.  Reviewed electrolytes      Type 2 diabetes mellitus without complication, without long-term current use of  insulin  Hemoglobin A1c pending  Hold metformin  Insulin regimen and adjust as needed for goal glucose 140-180      Hypertension, essential  Blood pressure high on arrival however patient was in respiratory distress  Address respiratory issues and resume diltiazem  Will add antihypertensives if needed      COPD (chronic obstructive pulmonary disease)  Classified as severe by her pulmonologist  We do not have thophylline in our facility  Doing nebulized treatments. Steroids dc'd        VTE Risk Mitigation (From admission, onward)         Ordered     heparin (porcine) injection 5,000 Units  Every 12 hours      06/25/20 1711     IP VTE HIGH RISK PATIENT  Once      06/25/20 1614     Place sequential compression device  Until discontinued      06/25/20 1614              Called son for an update.  No answer.  Left a voice message for call back.    Critical care time spent on the evaluation and treatment of severe organ dysfunction, review of pertinent labs and imaging studies, discussions with consulting providers and discussions with patient/family: > 35 minutes.      Bee Khan MD  Department of Hospital Medicine   [facility]

## 2020-06-28 NOTE — CARE UPDATE
[facility]  ICU Shift Summary  Date: 6/28/2020      COVID Test: (--)  Isolation: No active isolations     Prehospitalization: Home  Admit Date / LOS : 6/25/2020/ 3 days    Diagnosis: Acute on chronic respiratory failure    Consults:        Active: OT, PT and Pulm CC       Needed: N/A     Code Status: DNR   Advanced Directive: <no information>    LDA: Anderson       Central Lines/Site/Justification:Patient Does Not Have Central Line       Urinary Cath/Order/Justification:Critically Ill in the ICU and requiring intensive monitoring    Vasopressors/Infusions:        GOALS: Volume/ Hemodynamic: N/A                     RASS: 0  alert and calm    Pain Management: PO       Pain Controlled: yes     Rhythm: A-Fib    Respiratory Device: Vapotherm    Oxygen Concentration (%):  [40-50] 50             Most Recent SBT/ SAT: N/A       MOVE Screen: PT Consult    VTE Prophylaxis: Pharm  Mobility: OOB to Chair and A: Assist  Stress Ulcer Prophylaxis: Yes    Dietary: PO  Tolerance: yes  /  Advancement: @ goal    I & O (24h):    Intake/Output Summary (Last 24 hours) at 6/28/2020 1821  Last data filed at 6/28/2020 1700  Gross per 24 hour   Intake 1325 ml   Output 4150 ml   Net -2825 ml        Restraints: No    Significant Dates:  Post Op Date: N/A  Rescue Date: N/A  Imaging/ Diagnostics: N/A    Noteworthy Labs:  CO2 critical on AM labs 43    CBC/Anemia Labs: Coags:    Recent Labs   Lab 06/25/20  1351   WBC 6.89   HGB 10.5*   HCT 38.6      MCV 88   RDW 19.4*    Recent Labs   Lab 06/25/20  1351   INR 1.1   APTT 24.5        Chemistries:   Recent Labs   Lab 06/25/20  1351  06/27/20  0343 06/28/20  0228      < > 139 142   K 3.8   < > 4.5 4.2   CL 91*   < > 89* 89*   CO2 43*   < > 39* 47*   BUN 21   < > 30* 31*   CREATININE 0.8   < > 0.8 0.8   CALCIUM 9.2   < > 8.9 8.6*   PROT 7.4   < > 6.3 6.1   BILITOT 0.6   < > 0.4 0.4   ALKPHOS 142*   < > 115 104   ALT 33   < > 20 18   AST 35   < > 25 21   MG 1.6  --   --   --      < > = values in this interval not displayed.        Cardiac Enzymes: Ejection Fractions:    Recent Labs     06/25/20 2038 06/26/20  0041   TROPONINI 0.040* 0.030*    Nuc Stress EF   Date Value Ref Range Status   10/15/2018 73 % Final        POCT Glucose: HbA1c:    Recent Labs   Lab 06/28/20  0716 06/28/20  1123 06/28/20  1727   POCTGLUCOSE 99 103 134*    Hemoglobin A1C   Date Value Ref Range Status   06/26/2020 6.1 (H) 4.0 - 5.6 % Final     Comment:     ADA Screening Guidelines:  5.7-6.4%  Consistent with prediabetes  >or=6.5%  Consistent with diabetes  High levels of fetal hemoglobin interfere with the HbA1C  assay. Heterozygous hemoglobin variants (HbS, HgC, etc)do  not significantly interfere with this assay.   However, presence of multiple variants may affect accuracy.             ICU LOS 3d 1h  Level of Care: Critical Care    Shift Summary/Plan for the shift: SEE CARE PLAN NOTE

## 2020-06-28 NOTE — PT/OT/SLP EVAL
Occupational Therapy   Evaluation/Treatment    Name: Johanna Mancini  MRN: [id number]  Admitting Diagnosis:  Acute on chronic respiratory failure      Recommendations:     Discharge Recommendations: nursing facility, skilled  Discharge Equipment Recommendations:  (TBD)  Barriers to discharge:  Decreased caregiver support    Assessment:     Johanna Mancini is a 70 y.o. female with a medical diagnosis of Acute on chronic respiratory failure.  She presents with Performance deficits affecting function: weakness, impaired endurance, impaired self care skills, impaired functional mobilty, gait instability, impaired balance, decreased coordination, decreased lower extremity function, decreased safety awareness, edema, impaired skin, impaired cardiopulmonary response to activity.      OT initial eral completed and treatment initiated.  Pt desats to 83% on vapotherm @25 lpm, 50% FiO2 with OOB transfers and easily SOB.  Pt highly motivated and would be a great candidate for SNF.  Contin ue with OT POC.    Rehab Prognosis: Good; patient would benefit from acute skilled OT services to address these deficits and reach maximum level of function.       Plan:     Patient to be seen 5 x/week to address the above listed problems via self-care/home management, therapeutic activities, therapeutic exercises  · Plan of Care Expires: 07/21/20  · Plan of Care Reviewed with: patient    Subjective     Chief Complaint: hard to do my hair I get so SOB.  Patient/Family Comments/goals: wants to move around and not stay in bed all day.    Occupational Profile:  Living Environment: lives with son in [facility] with  Steps; tub/shower combo with shower chair. As per chart review, pt and son think NH is a  safety option for discharge.   Previous level of function: pt says she was modII with ADLS and ambulated with rollator, but  she was in need of Bathing  Supervision and  assistance for tub transfer  but her son was not able to provide the assistance she  needed so she began sponge bathing.  Pt was receiving HHOt/HHPT prior to admit. Son did  IADLS and provided transportation.    Roles and Routines: sedentary  Equipment Used at Home:  rollator, shower chair, oxygen, BIPAP  Assistance upon Discharge: unknown    Pain/Comfort:  · Pain Rating 1: 0/10  · Pain Rating Post-Intervention 1: 0/10    Patients cultural, spiritual, Hindu conflicts given the current situation: no    Objective:     Communicated with: nurse prior to session.  Patient found seated EOB with blood pressure cuff, oxygen, pulse ox (continuous), telemetry, rizo catheter(v apotherm@25lpm 50% fiO2) upon OT entry to room.    General Precautions: Standard, fall, respiratory   Orthopedic Precautions:N/A   Braces: N/A     Occupational Performance:      Functional Mobility/Transfers:  · Patient completed Sit <> Stand Transfer with contact guard assistance  with  rolling walker   · Patient completed Toilet Transfer Step Transfer technique with contact guard assistance with  rolling walker and bedside commode  · Functional Mobility: side steeped few steps next to bed with CGA using RW , limited distance 2/2 attached to vaoptherm.    Activities of Daily Living:  · Feeding:  modified independence extra time for breathing with VT  · Grooming: minimum assistance to brush haor seated EOB, SOB lifting arm overhead for extended period of time.   · Lower Body Dressing: total assistance socks, limited bending and severely SOB   · Toileting: total assistance rizo    Cognitive/Visual Perceptual:  Cognitive/Psychosocial Skills:     -       Oriented to: Person, Place, Time and Situation   -       Follows Commands/attention:Follows one-step commands  -       Communication: clear/fluent  -       Memory: No Deficits noted  -       Safety awareness/insight to disability: impaired   -       Mood/Affect/Coping skills/emotional control: Cooperative  Visual/Perceptual:      -Intact .    Physical Exam:  Balance:    -        sitting:  good   dynamic;  fair   standing;  fair    dynamic;  poor plus  Postural examination/scapula alignment:    -       Rounded shoulders  Skin integrity: Wound R lower leg wioth coban wrap  Edema:  Moderate BLE  Dominant hand:    -       right  Upper Extremity Range of Motion:     -       Right Upper Extremity: WFL  -       Left Upper Extremity: WFL  Upper Extremity Strength:    -       Right Upper Extremity: WFL  -       Left Upper Extremity: WFL   Strength:    -       Right Upper Extremity: WFL  -       Left Upper Extremity: WFL    AMPAC 6 Click ADL:  AMPAC Total Score: 16    Treatment & Education:  --Daughter and pt explained role of OTand POC, verbalized understanding.  -Sittting EOB  For ALDS and LE positioning BLE on plillows for optimal balance and safety when eating meals, pt feet not reaching floor 2/2 height of bed and short body habitus.  --safety ed  Using RW for transfer to [facility] 2/2  Pt. somewhat impulsive but able to redirect and slow her down until I untangled all  lines.  Education:    Patient left seated EOB with all lines intact, call button in reach, nurse, daughter notified and same present    GOALS:   Multidisciplinary Problems     Occupational Therapy Goals        Problem: Occupational Therapy Goal    Goal Priority Disciplines Outcome Interventions   Occupational Therapy Goal     OT, PT/OT Ongoing, Progressing    Description: Goals to be met by: 7/14/2020    Patient will increase functional independence with ADLs by performing:    UE Dressing with Modified [place].  LE Dressing with Modified [place].  Grooming while seated with Modified [place].  Toileting from bedside commode with Modified [place] for hygiene and clothing management.   Toilet transfer to bedside commode with Modified [place].  Upper extremity exercise program x 8 reps per handout, with independence, using ECt tech as neded to minimize SOb and maintain O2 >88%..                     History:      Past Medical History:   Diagnosis Date    Articular gout     Atrial fibrillation     Carotid artery occlusion     CHF (congestive heart failure)     COPD (chronic obstructive pulmonary disease)     Diabetes mellitus, type 2     Hyperlipidemia     Hypertension     Osteopetrosis     Osteoporosis     Sleep apnea        Past Surgical History:   Procedure Laterality Date    CHOLECYSTECTOMY      HYSTERECTOMY      MINERVA equivalent       Time Tracking:     OT Date of Treatment: 06/28/20  OT Start Time: 1628  OT Stop Time: 1647  OT Total Time (min): 19 min    Billable Minutes:Evaluation 10  Self Care/Home Management 9  Total Time 19    Annelise Fernandez OT  6/28/2020

## 2020-06-28 NOTE — SUBJECTIVE & OBJECTIVE
Interval History:  Feeling better every day.  Participated with physical therapy and was able to ambulate short distance using a walker.  Swelling in oxygenation slowly improving.  Renal function stable.    Review of Systems   Constitutional: Negative.    Respiratory: Negative.    Cardiovascular: Positive for leg swelling.   Gastrointestinal: Negative.    Neurological: Positive for weakness.     Objective:     Vital Signs (Most Recent):  Temp: 97.9 °F (36.6 °C) (06/28/20 0745)  Pulse: 88 (06/28/20 1300)  Resp: (!) 26 (06/28/20 1300)  BP: (!) 143/74 (06/28/20 1300)  SpO2: (!) 88 % (06/28/20 1300) Vital Signs (24h Range):  Temp:  [97.9 °F (36.6 °C)-98.5 °F (36.9 °C)] 97.9 °F (36.6 °C)  Pulse:  [] 88  Resp:  [11-30] 26  SpO2:  [87 %-99 %] 88 %  BP: (116-178)/() 143/74     Weight: 132.9 kg (292 lb 15.9 oz)  Body mass index is 47.29 kg/m².    Intake/Output Summary (Last 24 hours) at 6/28/2020 1407  Last data filed at 6/28/2020 1200  Gross per 24 hour   Intake 1225 ml   Output 5400 ml   Net -4175 ml      Physical Exam  Vitals signs and nursing note reviewed.   Constitutional:       Appearance: Normal appearance. She is obese. She is not ill-appearing.   Cardiovascular:      Rate and Rhythm: Normal rate and regular rhythm.   Pulmonary:      Effort: Pulmonary effort is normal.      Breath sounds: Rales present. No wheezing.      Comments: On Vapotherm  Distant breath sounds  Able to speak full sentences.  Abdominal:      Comments: Pitting edema to lower quadrants and panus  Hernia that is soft and reducible    Musculoskeletal:         General: Swelling (+ 3 up to abdomen, but better) present.   Skin:     General: Skin is warm and dry.      Capillary Refill: Capillary refill takes 2 to 3 seconds.      Findings: No erythema.      Comments:      Neurological:      Mental Status: She is alert and oriented to person, place, and time.         Significant Labs: All pertinent labs within the past 24 hours have been  reviewed.    Significant Imaging: I have reviewed all pertinent imaging results/findings within the past 24 hours.  I have reviewed and interpreted all pertinent imaging results/findings within the past 24 hours.

## 2020-06-28 NOTE — PLAN OF CARE
Problem: Occupational Therapy Goal  Goal: Occupational Therapy Goal  Description: Goals to be met by: 7/14/2020    Patient will increase functional independence with ADLs by performing:    UE Dressing with Modified [place].  LE Dressing with Modified [place].  Grooming while seated with Modified [place].  Toileting from bedside commode with Modified [place] for hygiene and clothing management.   Toilet transfer to bedside commode with Modified [place].  Upper extremity exercise program x 8 reps per handout, with independence, using ECt tech as neded to minimize SOb and maintain O2 >88%..    Outcome: Ongoing, Progressing   OT initial eral completed and treatment initiated.  Pt desats to 83% on vapotherm @25 lpm, 50% FiO2 with OOB transfers and easily SOB.  Pt highly motivated and would be a great candidate for SNF.  Contin ue with OT POC.

## 2020-06-28 NOTE — ASSESSMENT & PLAN NOTE
Known severe COPD on home O2  Hypercapnia but with renal compensation and acceptable pH  Very alert and conversational. Also very appropriate  Oxygenation is biggest issue  Agree with vapotherm for goal O2 sats 88-92% and BiPAP nightly  Continue IV diuresis as she is evidently volume overload  D-dimer is elevated. Bilateral lower extremity ultrasound negative for DVT.  Patient has clinically improved but if unable to wean off high-flow type nasal cannula, will obtain CTA of chest to rule out pulmonary embolism.  PT/OT consulted. Doing well. [facility] in [place] (patient aware and in agreement)  Pulm on board for co-management

## 2020-06-28 NOTE — PT/OT/SLP EVAL
Physical Therapy Evaluation    Patient Name:  Johanna Mancini   MRN:  [id number]    Recommendations:     Discharge Recommendations:  nursing facility, basic   Discharge Equipment Recommendations: wheelchair, bedside commode(bariatric w/c, bariatric BSC)   Barriers to discharge: Decreased caregiver support    Assessment:     Johanna Mancini is a 70 y.o. female admitted with a medical diagnosis of Acute on chronic respiratory failure.  She presents with the following impairments/functional limitations:  weakness, impaired endurance, impaired self care skills, impaired functional mobilty, gait instability, impaired balance, decreased coordination, decreased upper extremity function, decreased lower extremity function, decreased safety awareness, pain, edema, impaired cardiopulmonary response to activity Due to impairments, pt is at greater risk for falls and is unable to ambulate, perform transfers or ADL's at PLOF.    Rehab Prognosis: Fair; patient would benefit from acute skilled PT services to address these deficits and reach maximum level of function.    Recent Surgery: * No surgery found *      Plan:     During this hospitalization, patient to be seen 6 x/week to address the identified rehab impairments via gait training, therapeutic activities, therapeutic exercises, neuromuscular re-education, wheelchair management/training and progress toward the following goals:    · Plan of Care Expires:  07/12/20    Subjective     Chief Complaint: weakness/fatigue from having extra fluid  Patient/Family Comments/goals: Pt would like to regain strength and return to PLOF  Pain/Comfort:  · Pain Rating 1: 0/10  · Pain Addressed 1: Nurse notified  · Pain Rating Post-Intervention 1: 0/10    Patients cultural, spiritual, Yarsanism conflicts given the current situation: no    Living Environment:  PTA pt was living with her son Alli. Both pt and son feel that alf NH would be the safest option upon d/c.  Prior to admission,  patients level of function was modified independent with transfers and ADL's, pt received help from son for dressing and bathing as necessary.  Equipment used at home: rollator, cane, quad, shower chair, oxygen.  DME owned (not currently used): quad cane.  Upon discharge, patient will have assistance from NH staff.    Objective:     Communicated with nsg prior to session.  Patient found HOB elevated with rizo catheter, oxygen, peripheral IV, pulse ox (continuous), telemetry(25 lpm 50 FiO2)  upon PT entry to room.    General Precautions: Standard, fall   Orthopedic Precautions:    Braces: N/A     Exams:  · Cognitive Exam:  Patient is oriented to Person, Place, Time and Situation  · RLE ROM: WFL  · RLE Strength: Deficits: 4-/5 knee ext, 4-/5 ankle DF  · LLE ROM: WFL  · LLE Strength: Deficits: 4-/5 knee ext, 4-/5 ankle DF    Functional Mobility:  · Bed Mobility:     · Rolling Left:  moderate assistance  · Scooting: maximal assistance  · Bridging: moderate assistance  · Supine to Sit: moderate assistance  · Sit to Supine: minimum assistance  · Transfers:     · Sit to Stand:  minimum assistance with rolling walker  · Gait: Pt took 4 side steps at EOB x5 w/ RW and CGAx1. Pt benefits from VC's for walker management, pacing, breathing and posture  · Balance: good - in sitting, poor in standing    Therapeutic Activities and Exercises:   Pt educated on ankle pumps to be performed in bed. Pt tolerated bed mobility/ transfer assessment fairly well. During gait training, pt requires frequent cuing to breathe through nose in order to maintain SpO2 above 90%    AM-PAC 6 CLICK MOBILITY  Total Score:12     Patient left HOB elevated with all lines intact, call button in reach and nsg notified.    GOALS:   Multidisciplinary Problems     Physical Therapy Goals        Problem: Physical Therapy Goal    Goal Priority Disciplines Outcome Goal Variances Interventions   Physical Therapy Goal     PT, PT/OT Ongoing, Progressing      Description: Goals to be met by: 20     Patient will increase functional independence with mobility by performin. Supine to sit with Stand-by Assistance  2. Sit to supine with Stand-by Assistance  3. Rolling to Left and Right with Modified [place].  4. Sit to stand transfer with Supervision and RW  5. Gait  x 50 feet with Contact Guard Assistance using Rolling Walker.   6. Wheelchair propulsion x200 feet with Supervision using bilateral uppper extremities                     History:     Past Medical History:   Diagnosis Date    Articular gout     Atrial fibrillation     Carotid artery occlusion     CHF (congestive heart failure)     COPD (chronic obstructive pulmonary disease)     Diabetes mellitus, type 2     Hyperlipidemia     Hypertension     Osteopetrosis     Osteoporosis     Sleep apnea        Past Surgical History:   Procedure Laterality Date    CHOLECYSTECTOMY      HYSTERECTOMY      MINERVA equivalent       Time Tracking:     PT Received On: 20  PT Start Time: 1002     PT Stop Time: 1028  PT Total Time (min): 26 min     Billable Minutes: Evaluation 12 and Gait Training 14      Lee Barba, PT  2020

## 2020-06-28 NOTE — PROGRESS NOTES
Progress Note  [facility]        SUBJECTIVE:     Chief Complaint:   Acute respiratory failure    History of Present Illness/Interval History:  Pt reports subjective improvement in her dyspnea.    Review of patient's allergies indicates:   Allergen Reactions    Tylenol [acetaminophen] Anaphylaxis    Hydrocodone-acetaminophen Itching    Levofloxacin Itching       Past Medical History:   Diagnosis Date    Articular gout     Atrial fibrillation     Carotid artery occlusion     CHF (congestive heart failure)     COPD (chronic obstructive pulmonary disease)     Diabetes mellitus, type 2     Hyperlipidemia     Hypertension     Osteopetrosis     Osteoporosis     Sleep apnea      Past Surgical History:   Procedure Laterality Date    CHOLECYSTECTOMY      HYSTERECTOMY      MINERVA equivalent     Family History   Problem Relation Age of Onset    Heart disease Mother     Cancer Father     Cancer Sister         breast     Social History     Socioeconomic History    Marital status:      Spouse name: Not on file    Number of children: Not on file    Years of education: Not on file    Highest education level: Not on file   Occupational History    Not on file   Social Needs    Financial resource strain: Not on file    Food insecurity     Worry: Not on file     Inability: Not on file    Transportation needs     Medical: Not on file     Non-medical: Not on file   Tobacco Use    Smoking status: Former Smoker    Smokeless tobacco: Never Used   Substance and Sexual Activity    Alcohol use: No    Drug use: No    Sexual activity: Not on file   Lifestyle    Physical activity     Days per week: Not on file     Minutes per session: Not on file    Stress: Not on file   Relationships    Social connections     Talks on phone: Not on file     Gets together: Not on file     Attends [employer] service: Not on file     Active member of club or organization: Not on file     Attends meetings of clubs or  organizations: Not on file     Relationship status: Not on file   Other Topics Concern    Not on file   Social History Narrative    .  Three children. Former smoker, quit 15 yr ago.  Retired , cook and kitchen work.        Review of Systems:  CV: no syncope  ENT: no sore throat  Resp: per hpi  Eyes: no visual changes  Gastrointestinal: no nausea or vomiting  Integument/Breast: no rash  Musculoskeletal: no arthralgias  Neurological: + headache today  Behavioral/Psych: no confusion or depression  Heme: no bleeding      OBJECTIVE:     Vital Signs  Vitals:    06/28/20 1215 06/28/20 1230 06/28/20 1245 06/28/20 1300   BP:    (!) 143/74   Pulse: 81 86 82 88   Resp: (!) 30 (!) 25 (!) 27 (!) 26   Temp:       TempSrc:       SpO2: (!) 92% (!) 91% (!) 89% (!) 88%   Weight:       Height:         Body mass index is 47.29 kg/m².    Physical Exam:  General: no distress  Eyes:  conjunctivae/corneas clear  Nose: no discharge  Neck: trachea midline with no masses appreciated  Lungs:  normal respiratory effort, no wheezes, +diminished at bilateral bases  Heart: regular rate and rhythm and no murmur  Abdomen: non-distended, non-tender to palpation  Extremities: no cyanosis, +severe dependent edema, no clubbing  Skin: No rashes or lesions. good skin turgor  Neurologic: alert, oriented, thought content appropriate    Laboratory:  Lab Results   Component Value Date    WBC 6.89 06/25/2020    HGB 10.5 (L) 06/25/2020    HCT 38.6 06/25/2020    MCV 88 06/25/2020     06/25/2020       Chest Imaging, My Impression:   CXR 6/25: bilateral airspace disease    Diagnostic Results:  Echo: Reviewed    ASSESSMENT/PLAN:     1) Acute on chronic hypoxemic & hypercapnic respiratory failure. Continue vapotherm.  2) Acute on chronic diastolic congestive heart failure. Transition from lasix to diamox as diuretic regimen due to rising bicarb.  3) Obesity hypoventilation syndrome. Recommend weight loss counseling.    Pulmonary service will  sign off. Thank you.    Marco Antonio Michael MD  [facility]

## 2020-06-29 NOTE — SUBJECTIVE & OBJECTIVE
Interval History:  Feeling better every day. Does complain of back pain. Still on vapotherm    Review of Systems   Constitutional: Negative.    Respiratory: Negative.    Cardiovascular: Positive for leg swelling.   Gastrointestinal: Negative.    Neurological: Positive for weakness.     Objective:     Vital Signs (Most Recent):  Temp: 98 °F (36.7 °C) (06/29/20 1600)  Pulse: 70 (06/29/20 1800)  Resp: (!) 29 (06/29/20 1800)  BP: (!) 164/69 (06/29/20 1800)  SpO2: (!) 91 % (06/29/20 1800) Vital Signs (24h Range):  Temp:  [97.7 °F (36.5 °C)-98 °F (36.7 °C)] 98 °F (36.7 °C)  Pulse:  [62-88] 70  Resp:  [12-32] 29  SpO2:  [82 %-99 %] 91 %  BP: (128-164)/(59-78) 164/69     Weight: 129.3 kg (285 lb 0.9 oz)  Body mass index is 46.01 kg/m².    Intake/Output Summary (Last 24 hours) at 6/29/2020 1841  Last data filed at 6/29/2020 1800  Gross per 24 hour   Intake 890 ml   Output 3700 ml   Net -2810 ml      Physical Exam  Vitals signs and nursing note reviewed.   Constitutional:       Appearance: Normal appearance. She is obese. She is not ill-appearing.   Cardiovascular:      Rate and Rhythm: Normal rate and regular rhythm.   Pulmonary:      Effort: Pulmonary effort is normal.      Breath sounds: No wheezing or rales.      Comments: On Vapotherm  Distant breath sounds  Able to speak full sentences.  Abdominal:      Comments: Pitting edema to lower quadrants and panus  Hernia that is soft and reducible    Musculoskeletal:         General: Swelling (+ 3 up to abdomen, but better) present.   Skin:     General: Skin is warm and dry.      Capillary Refill: Capillary refill takes less than 2 seconds.      Findings: No erythema.      Comments:      Neurological:      Mental Status: She is alert and oriented to person, place, and time.         Significant Labs: All pertinent labs within the past 24 hours have been reviewed.    Significant Imaging: I have reviewed all pertinent imaging results/findings within the past 24 hours.  I have  reviewed and interpreted all pertinent imaging results/findings within the past 24 hours.

## 2020-06-29 NOTE — HOSPITAL COURSE
Ms. Mancini presented with acute on chronic respiratory failure due to a combination of progression of her severe COPD and acute on chronic diastolic heart failure.  Patient was placed on a BiPAP, initiated on IV furosemide and admitted to ICU.  Patient improved with IV diuresis and de-escalated to vapotherm cannula and BiPAP q.h.s.  Pulmonology followed. Diuresis changed to diamox given increased bicarb. Vapotherm being weaned.  Since her D-dimer was elevated, she underwent evaluation with ultrasound lower extremity which was negative for DVT.  Suspicion was low for PE.  Patient steadily improved she was weaned to 15 L and 50% FiO2 Vapotherm and was stable for step down to the floor.  Patient had extensive conversation throughout hospitalization and requested DNR status.  She had good insight into her medical disease with end-stage COPD.  After patient was transfer the floor, receiving nurse noted she was stable.  Later that evening, she was found unresponsive, asystolic, apneic with pupil fixed and dilated.  Time of death was 1:25 AM, on June 1, 2020.

## 2020-06-29 NOTE — PHYSICIAN QUERY
PT Name: Johanna Mancini  MR #: [id number]    Consultant Diagnosis Clarification     CDS: Alexis Quinn RN CCDS               Contact information: [email or web address]   This form is a permanent document in the medical record.    Query Date: June 29, 2020      By submitting this query, we are merely seeking further clarification of documentation.  Please utilize your independent clinical judgment when addressing the question(s) below.    The Medical Record reflects the following:    Clinical Information Location in Medical Record   Acute on chronic diastolic congestive heart failure. Transition from lasix to diamox as diuretic regimen due to rising bicarb.  Acute on chronic hypoxemic & hypercapnic respiratory failure    The patient has a history of atrial fibrillation and congestive heart failure as well as chronic obstructive pulmonary disease she has wheezing.    shortness of breath  generalized anasarca    Musculoskeletal:         General: Swelling (+ 3, wheeping ) present.  Abdominal:      Comments: Pitting edema to lower quadrants and panus    Acute on chronic respiratory failure  Known COPD on home O2  Possibly with heart failure too    acetaZOLAMIDE (DIAMOX) 500 mg IV daily  furosemide 60 mg IV q12h  furosemide 80 mg IV    Pulmonary interstitial edema without sizable pleural effusion.     6/25/2020 13:51    (H)     Normal left ventricular systolic function. The estimated ejection fraction is 60%.  Moderate to severe left atrial enlargement.  Indeterminate left ventricular diastolic function.  Normal right ventricular systolic function.  Severe right atrial enlargement.  Mild mitral regurgitation.  Mild to moderate tricuspid regurgitation.  Elevated central venous pressure (15 mmHg).  The estimated PA systolic pressure is 62 mmHg.  Pulmonary hypertension present. 6/28/2020 Pulmonology progress notes Marco Antonio Michael MD      6/25/2020 ED provider notes Khoi Cardenas MD          6/25/2020 H&P  Bee Lopez MD                  6/28/2020- 6/29/2020 Medication  6/25-6/28/2020 Medication  6/25/2020 Medication    6/25/2020 Chest x-ray     Lab value      6/26/2020 Echo report       Please clarify/confirm the Consultants diagnosis of Acute on chronic diastolic congestive heart failure:     [  x ] Diagnosis ruled in   [   ] Diagnosis ruled out   [   ] Other diagnosis (please specify): _____________________________   [  ] Clinically undetermined

## 2020-06-29 NOTE — PROGRESS NOTES
"[facility] Medicine  Progress Note    Patient Name: Johanna Mancini  MRN: [id number]  Patient Class: IP- Inpatient   Admission Date: 6/25/2020  Length of Stay: 4 days  Attending Physician: Bee Lopez MD  Primary Care Provider: Johnson Mcintosh MD        Subjective:     Principal Problem:Acute on chronic respiratory failure        HPI:  70 year old female with chronic obstructive pulmonary disease, chronic respiratory failure on home O2, obesity, diabetes mellitus type 2 and atrial fibrillation who presents from home with shortness of breath and lower extremity edema.  Patient unable to provide history at this time as she is somnolent and on NIPPV. I called her daughter, Britt, who states she last saw patient 2 days ago and appeared to be at her normal state. States patient lives with her brother Alli. I called Alli. He states patient has not been doing well lately. He adds that she has been "out of it", "falls asleep on her chair", "can't get up", forgetful and an episode of nose bleed. Also mentioned she might be short of breath despite using home O2. He cannot recall all of her meds but will get back to us once he has her list. He is worried about her when on her own as he works. Per chart review, patient was initiated on Xarelto likely for stroke prophylaxis for AFib. Apparently patient stopped taking it Xarelto due to an episode of nose bleed and only taking aspirin.     Per ED staff, EMS reported saturation of 70% on arrival.  She was placed on CPAP and transported to our emergency department.  On arrival, patient was tachypneic.  Placed on BiPAP and given a dose of IV furosemide.  Patient nods yes to feeling a little better but not ready to come off BiPAP.  Admitted to ICU for further management.    Overview/Hospital Course:  Ms Mancini presented with acute on chronic respiratory failure.  This is likely due to a combination of progression av COPD and acute on chronic diastolic " heart failure.  Patient was placed on a BiPAP, initiated on IV furosemide and admitted to ICU.  Patient improved with IV diuresis and de-escalated to vapotherm cannula and BiPAP q.h.s..  Pulmonology consulted.    Interval History:  Feeling better every day. Does complain of back pain. Still on vapotherm    Review of Systems   Constitutional: Negative.    Respiratory: Negative.    Cardiovascular: Positive for leg swelling.   Gastrointestinal: Negative.    Neurological: Positive for weakness.     Objective:     Vital Signs (Most Recent):  Temp: 98 °F (36.7 °C) (06/29/20 1600)  Pulse: 70 (06/29/20 1800)  Resp: (!) 29 (06/29/20 1800)  BP: (!) 164/69 (06/29/20 1800)  SpO2: (!) 91 % (06/29/20 1800) Vital Signs (24h Range):  Temp:  [97.7 °F (36.5 °C)-98 °F (36.7 °C)] 98 °F (36.7 °C)  Pulse:  [62-88] 70  Resp:  [12-32] 29  SpO2:  [82 %-99 %] 91 %  BP: (128-164)/(59-78) 164/69     Weight: 129.3 kg (285 lb 0.9 oz)  Body mass index is 46.01 kg/m².    Intake/Output Summary (Last 24 hours) at 6/29/2020 1841  Last data filed at 6/29/2020 1800  Gross per 24 hour   Intake 890 ml   Output 3700 ml   Net -2810 ml      Physical Exam  Vitals signs and nursing note reviewed.   Constitutional:       Appearance: Normal appearance. She is obese. She is not ill-appearing.   Cardiovascular:      Rate and Rhythm: Normal rate and regular rhythm.   Pulmonary:      Effort: Pulmonary effort is normal.      Breath sounds: No wheezing or rales.      Comments: On Vapotherm  Distant breath sounds  Able to speak full sentences.  Abdominal:      Comments: Pitting edema to lower quadrants and panus  Hernia that is soft and reducible    Musculoskeletal:         General: Swelling (+ 3 up to abdomen, but better) present.   Skin:     General: Skin is warm and dry.      Capillary Refill: Capillary refill takes less than 2 seconds.      Findings: No erythema.      Comments:      Neurological:      Mental Status: She is alert and oriented to person, place, and  time.         Significant Labs: All pertinent labs within the past 24 hours have been reviewed.    Significant Imaging: I have reviewed all pertinent imaging results/findings within the past 24 hours.  I have reviewed and interpreted all pertinent imaging results/findings within the past 24 hours.      Assessment/Plan:      * Acute on chronic respiratory failure  Known severe COPD on home O2  Hypercapnia but with renal compensation and acceptable pH  Very alert and conversational. Also very appropriate  Oxygenation is biggest issue  Agree with vapotherm for goal O2 sats 88-92% and BiPAP nightly  Continue IV diuresis as she is evidently volume overload  D-dimer is elevated. Bilateral lower extremity ultrasound negative for DVT.  Patient has clinically improved but if unable to wean off vapotherm, will obtain CTA of chest to rule out pulmonary embolism.  PT/OT consulted. Doing well. Working on skilled nursing NH placement in [place] (patient aware and in agreement)  Pulm on board for co-management          DNR (do not resuscitate) discussion  I spent > 16 min discussing code status with patient.  Patient states she has had discussions with several for providers regarding respiratory failure.  She states she has been explained by all of these providers that if ever intubated would be very difficult to wean off ventilator.  Patient states that she is very aware of how bad her COPD is and is accepting of her condition.  States that she would not undergo any measures that would prolong suffering or cause harm.  This includes chest compressions, intubation, shock and drug protocol.  Changed code status to DNR.  Patient's son, Alli, is aware.      Chronic respiratory failure with hypoxia, on home O2 therapy  as above      Other persistent atrial fibrillation  Rate controlled  Resume oral diltiazem  Patient not interested in resuming anticoagulation as she has experienced epistaxis and rectal bleed (not severe; 2/2 hemorrhoids).  Aware of high risk for stroke  Will do aspirin in the meantime  Cardiac monitoring.  Reviewed electrolytes      Type 2 diabetes mellitus without complication, without long-term current use of insulin  Hemoglobin A1c pending  Hold metformin  Insulin regimen and adjust as needed for goal glucose 140-180      Hypertension, essential  Blood pressure high on arrival however patient was in respiratory distress  Address respiratory issues and resume diltiazem  Will add antihypertensives if needed      COPD (chronic obstructive pulmonary disease)  Classified as severe by her pulmonologist  We do not have thophylline in our facility  Doing nebulized treatments. Steroids dc'd        VTE Risk Mitigation (From admission, onward)         Ordered     heparin (porcine) injection 5,000 Units  Every 12 hours      06/25/20 1711     IP VTE HIGH RISK PATIENT  Once      06/25/20 1614     Place sequential compression device  Until discontinued      06/25/20 1614                Critical care time spent on the evaluation and treatment of severe organ dysfunction, review of pertinent labs and imaging studies, discussions with consulting providers and discussions with patient/family: > 35 minutes.      Bee Khan MD  Department of Hospital Medicine   [facility]

## 2020-06-29 NOTE — CARE UPDATE
[facility]  ICU Shift Summary  Date: 6/29/2020      COVID Test: (--) 6/25  Isolation: No active isolations     Prehospitalization: Home  Admit Date / LOS : 6/25/2020/ 4 days    Diagnosis: Acute on chronic respiratory failure    Consults:        Active: OT, PT, Pulm CC and SW       Needed: Wound Care (order placed and Ada Misael called)     Code Status: DNR   Advanced Directive: <no information>    LDA: BIPAP, Anderson and PIV       Central Lines/Site/Justification:Patient Does Not Have Central Line       Urinary Cath/Order/Justification:Critically Ill in the ICU and requiring intensive monitoring    Vasopressors/Infusions:        GOALS: Volume/ Hemodynamic: N/A                     RASS: 0  alert and calm    Pain Management: PO       Pain Controlled: yes     Rhythm: A-Fib    Respiratory Device: Vapotherm and Bipap    Oxygen Concentration (%):  [40-50] 40             Most Recent SBT/ SAT: N/A       MOVE Screen: PT Consult    VTE Prophylaxis: Pharm and Ambulation  Mobility: Ambulatory, OOB to Chair and A: Assist  Stress Ulcer Prophylaxis: Yes    Dietary: PO  Tolerance: yes  /  Advancement: yes    I & O (24h):    Intake/Output Summary (Last 24 hours) at 6/29/2020 0631  Last data filed at 6/29/2020 0609  Gross per 24 hour   Intake 1570 ml   Output 5575 ml   Net -4005 ml        Restraints: No    Significant Dates:  Post Op Date: N/A  Rescue Date: N/A  Imaging/ Diagnostics: N/A    Noteworthy Labs:  Co2 42    CBC/Anemia Labs: Coags:    Recent Labs   Lab 06/25/20  1351   WBC 6.89   HGB 10.5*   HCT 38.6      MCV 88   RDW 19.4*    Recent Labs   Lab 06/25/20  1351   INR 1.1   APTT 24.5        Chemistries:   Recent Labs   Lab 06/25/20  1351  06/28/20  0228 06/29/20  0356      < > 142 139   K 3.8   < > 4.2 4.2   CL 91*   < > 89* 90*   CO2 43*   < > 47* 42*   BUN 21   < > 31* 24*   CREATININE 0.8   < > 0.8 0.7   CALCIUM 9.2   < > 8.6* 8.6*   PROT 7.4   < > 6.1 6.3   BILITOT 0.6   < > 0.4 0.4   ALKPHOS  142*   < > 104 100   ALT 33   < > 18 20   AST 35   < > 21 26   MG 1.6  --   --   --     < > = values in this interval not displayed.        Cardiac Enzymes: Ejection Fractions:    No results for input(s): CPK, CPKMB, MB, TROPONINI in the last 72 hours. Nuc Stress EF   Date Value Ref Range Status   10/15/2018 73 % Final        POCT Glucose: HbA1c:    Recent Labs   Lab 06/28/20  1123 06/28/20  1727 06/28/20  2110   POCTGLUCOSE 103 134* 168*    Hemoglobin A1C   Date Value Ref Range Status   06/26/2020 6.1 (H) 4.0 - 5.6 % Final     Comment:     ADA Screening Guidelines:  5.7-6.4%  Consistent with prediabetes  >or=6.5%  Consistent with diabetes  High levels of fetal hemoglobin interfere with the HbA1C  assay. Heterozygous hemoglobin variants (HbS, HgC, etc)do  not significantly interfere with this assay.   However, presence of multiple variants may affect accuracy.             ICU LOS 3d 13h  Level of Care: OK to Transfer    Shift Summary/Plan for the shift: Patient remains in ICU overnight. Afib on cardiac monitor. VSS. Pt wore Vapotherm 25 L and 50% FiO2, refused to wear bipap overnight despite education on rationale for order.  Pt asked to be placed on Bipap at 0600, stating she felt SOB. Wound on R lower leg cleansed and redressed with gauze and kerlix. Wound care consult placed, left voicemail for Ada Misael. 2L UO, no BM. No falls, injury, or skin breakdown.

## 2020-06-29 NOTE — ASSESSMENT & PLAN NOTE
I spent > 16 min discussing code status with patient.  Patient states she has had discussions with several for providers regarding respiratory failure.  She states she has been explained by all of these providers that if ever intubated would be very difficult to wean off ventilator.  Patient states that she is very aware of how bad her COPD is and is accepting of her condition.  States that she would not undergo any measures that would prolong suffering or cause harm.  This includes chest compressions, intubation, shock and drug protocol.  Changed code status to DNR.  Patient's son, Alli, is aware.

## 2020-06-29 NOTE — PROGRESS NOTES
Pt daughter in law Rajani Wilkins at [phone] contacted AIME and informed her that she put the pt on the waiting list for [facility] in [facility]. AIME sent pt referral via [facility] to the Nursing Home.

## 2020-06-29 NOTE — PLAN OF CARE
Patient awake and alert  oriented time 4. Rested well this am while on bipap and woke up refreshed. Patient Ate well and sat up on side of bed with feet dangling for about 3 hours. Patient declined to have wound care nurse look at wound on right leg. Patient remains in afib but rate is controlled. Patient has decided to go to nursing home when discharged from hospital. Family is upset with her according to patient. Vital signs stable and patient diuresed over liter and half today. No falls or injuries this shift. No new skin breakdown this shift.. Plan of care reviewed with patient and family at  bedside.

## 2020-06-29 NOTE — ASSESSMENT & PLAN NOTE
Known severe COPD on home O2  Hypercapnia but with renal compensation and acceptable pH  Very alert and conversational. Also very appropriate  Oxygenation is biggest issue  Agree with vapotherm for goal O2 sats 88-92% and BiPAP nightly  Continue IV diuresis as she is evidently volume overload  D-dimer is elevated. Bilateral lower extremity ultrasound negative for DVT.  Patient has clinically improved but if unable to wean off vapotherm, will obtain CTA of chest to rule out pulmonary embolism.  PT/OT consulted. Doing well. Working on [facility] NH placement in [place] (patient aware and in agreement)  Pulm on board for co-management

## 2020-06-29 NOTE — PLAN OF CARE
06/29/20 1157   Discharge Reassessment   Assessment Type Discharge Planning Reassessment   Provided patient/caregiver education on the expected discharge date and the discharge plan Yes   Do you have any problems affording any of your prescribed medications? No   Discharge Plan A [facility] placement [facility]   Discharge Plan B Home with family;Home Health   DME Needed Upon Discharge  none   Patient choice form signed by patient/caregiver N/A   Anticipated Discharge Disposition [facility]   Can the patient/caregiver answer the patient profile reliably? Yes, cognitively intact   How does the patient rate their overall health at the present time? Fair   Describe the patient's ability to walk at the present time. Major restrictions/daily assistance from another person   How often would a person be available to care for the patient? Whenever needed   Number of comorbid conditions (as recorded on the chart) Five or more

## 2020-06-29 NOTE — CONSULTS
Nursing consult for wounds right shin  A 70 year old female admitted 6/25/20 from home with acute on chronic respiratory failure; chronic respiratory failure with hypoxia-on home O2; other persistent atrial fibrillation; DM II; HTN; COPD  PMH: Articular gout; Afib; carotid artery occlusion; CHF; COPD; DM II; HLD; HTN; osteoporosis; sleep apnea  6/25 WBC 6.89 Hgb 10.5 Hct 38.6    6/29 Alb 2.7    6/26 A1C 6.1  On admission, patient complained of bilateral leg swelling with weeping  On Isolibrium mattress  Assessment:  Sitting up on side of bed with legs in dependent position. Nursing states patient has been sitting up for approximately 2 hours. Patient asked that legs be assessed tomorrow since dressing changed today per nursing.   Patient reports ambulatory at home. Sleeps in a bed, but during night must put legs in dependent position due to tingling. States legs swell and drain at home, but denies having blisters in the past.   Plan:  Assess right leg wound tomorrow and develop treatment plan. Nursing informed of plan.

## 2020-06-30 PROBLEM — L97.909 VENOUS STASIS ULCERS: Status: ACTIVE | Noted: 2020-01-01

## 2020-06-30 PROBLEM — I83.009 VENOUS STASIS ULCERS: Status: ACTIVE | Noted: 2020-01-01

## 2020-06-30 PROBLEM — R53.81 DEBILITY: Status: ACTIVE | Noted: 2020-01-01

## 2020-06-30 NOTE — SUBJECTIVE & OBJECTIVE
Interval History:  Feeling better every day. Vapotherm weaned down to 15L and 50%. Now with pain in legs after getting wound care.    Review of Systems   Constitutional: Negative for chills and fever.   Cardiovascular: Positive for leg swelling.   Neurological: Positive for weakness.     Objective:     Vital Signs (Most Recent):  Temp: 97.5 °F (36.4 °C) (06/30/20 0737)  Pulse: 74 (06/30/20 1148)  Resp: 11 (06/30/20 1148)  BP: 135/61 (06/30/20 1100)  SpO2: (!) 92 % (06/30/20 1148) Vital Signs (24h Range):  Temp:  [97.4 °F (36.3 °C)-98.2 °F (36.8 °C)] 97.5 °F (36.4 °C)  Pulse:  [56-88] 74  Resp:  [9-32] 11  SpO2:  [85 %-99 %] 92 %  BP: (121-164)/() 135/61     Weight: 129 kg (284 lb 6.3 oz)  Body mass index is 45.9 kg/m².    Intake/Output Summary (Last 24 hours) at 6/30/2020 1525  Last data filed at 6/30/2020 1118  Gross per 24 hour   Intake 580 ml   Output 2235 ml   Net -1655 ml      Physical Exam  Vitals signs and nursing note reviewed.   Constitutional:       Appearance: She is obese. She is not ill-appearing.      Comments: Chronically ill appearing   HENT:      Head: Normocephalic.      Mouth/Throat:      Pharynx: Oropharynx is clear.   Eyes:      Conjunctiva/sclera: Conjunctivae normal.   Cardiovascular:      Rate and Rhythm: Normal rate and regular rhythm.   Pulmonary:      Effort: Pulmonary effort is normal.      Breath sounds: No wheezing or rales.      Comments: On Vapotherm, distant breath sounds, able to speak in full sentences  Abdominal:      Palpations: Abdomen is soft.      Tenderness: There is no abdominal tenderness.      Comments: Pitting edema to lower quadrants and panus  Hernia that is soft and reducible    Musculoskeletal:         General: Swelling (+ 2 up to abdomen, but better) present.   Skin:     General: Skin is warm and dry.      Capillary Refill: Capillary refill takes less than 2 seconds.      Findings: No erythema.      Comments:      Neurological:      Mental Status: She is alert  and oriented to person, place, and time.         Significant Labs: All pertinent labs within the past 24 hours have been reviewed.    Significant Imaging: I have reviewed all pertinent imaging results/findings within the past 24 hours.  I have reviewed and interpreted all pertinent imaging results/findings within the past 24 hours.

## 2020-06-30 NOTE — ASSESSMENT & PLAN NOTE
Dr. Khan spent > 16 min discussing code status with patient.  Patient states she has had discussions with several providers regarding respiratory failure.    She states she has been explained by all of these providers that if ever intubated, she would be very difficult to wean off ventilator.  Patient states that she is very aware of how bad her COPD is and is accepting of her condition.  States that she would not undergo any measures that would prolong suffering or cause harm.    This includes chest compressions, intubation, shock and drug protocol.    Changed code status to DNR.  Patient's son, Alli, is aware.  Consulted palliative care for ongoing discussion, and support   Will need completed LaPOST done prior to discharge

## 2020-06-30 NOTE — PROGRESS NOTES
"[facility] Hospital Medicine  Progress Note    Patient Name: Johanna Mancini  MRN: [id number]  Patient Class: IP- Inpatient   Admission Date: 6/25/2020  Length of Stay: 5 days  Attending Physician: Jinny Keita MD  Primary Care Provider: Johnson Mcintosh MD        Subjective:     Principal Problem:Acute on chronic respiratory failure        HPI:  70 year old female with chronic obstructive pulmonary disease, chronic respiratory failure on home O2, obesity, diabetes mellitus type 2 and atrial fibrillation who presents from home with shortness of breath and lower extremity edema.  Patient unable to provide history at this time as she is somnolent and on NIPPV. I called her daughter, Britt, who states she last saw patient 2 days ago and appeared to be at her normal state. States patient lives with her brother Alli. I called Alli. He states patient has not been doing well lately. He adds that she has been "out of it", "falls asleep on her chair", "can't get up", forgetful and an episode of nose bleed. Also mentioned she might be short of breath despite using home O2. He cannot recall all of her meds but will get back to us once he has her list. He is worried about her when on her own as he works. Per chart review, patient was initiated on Xarelto likely for stroke prophylaxis for AFib. Apparently patient stopped taking it Xarelto due to an episode of nose bleed and only taking aspirin.     Per ED staff, EMS reported saturation of 70% on arrival.  She was placed on CPAP and transported to our emergency department.  On arrival, patient was tachypneic.  Placed on BiPAP and given a dose of IV furosemide.  Patient nods yes to feeling a little better but not ready to come off BiPAP.  Admitted to ICU for further management.    Overview/Hospital Course:  Ms. Mancini presented with acute on chronic respiratory failure due to a combination of progression of her severe COPD and acute on chronic diastolic heart " failure.  Patient was placed on a BiPAP, initiated on IV furosemide and admitted to ICU.  Patient improved with IV diuresis and de-escalated to vapotherm cannula and BiPAP q.h.s.  Pulmonology consulted. Diuresis changed to diamox given increased bicarb. Vapotherm being weaned.    Interval History:  Feeling better every day. Vapotherm weaned down to 15L and 50%. Now with pain in legs after getting wound care.    Review of Systems   Constitutional: Negative for chills and fever.   Cardiovascular: Positive for leg swelling.   Neurological: Positive for weakness.     Objective:     Vital Signs (Most Recent):  Temp: 97.5 °F (36.4 °C) (06/30/20 0737)  Pulse: 74 (06/30/20 1148)  Resp: 11 (06/30/20 1148)  BP: 135/61 (06/30/20 1100)  SpO2: (!) 92 % (06/30/20 1148) Vital Signs (24h Range):  Temp:  [97.4 °F (36.3 °C)-98.2 °F (36.8 °C)] 97.5 °F (36.4 °C)  Pulse:  [56-88] 74  Resp:  [9-32] 11  SpO2:  [85 %-99 %] 92 %  BP: (121-164)/() 135/61     Weight: 129 kg (284 lb 6.3 oz)  Body mass index is 45.9 kg/m².    Intake/Output Summary (Last 24 hours) at 6/30/2020 1525  Last data filed at 6/30/2020 1118  Gross per 24 hour   Intake 580 ml   Output 2235 ml   Net -1655 ml      Physical Exam  Vitals signs and nursing note reviewed.   Constitutional:       Appearance: She is obese. She is not ill-appearing.      Comments: Chronically ill appearing   HENT:      Head: Normocephalic.      Mouth/Throat:      Pharynx: Oropharynx is clear.   Eyes:      Conjunctiva/sclera: Conjunctivae normal.   Cardiovascular:      Rate and Rhythm: Normal rate and regular rhythm.   Pulmonary:      Effort: Pulmonary effort is normal.      Breath sounds: No wheezing or rales.      Comments: On Vapotherm, distant breath sounds, able to speak in full sentences  Abdominal:      Palpations: Abdomen is soft.      Tenderness: There is no abdominal tenderness.      Comments: Pitting edema to lower quadrants and panus  Hernia that is soft and reducible     Musculoskeletal:         General: Swelling (+ 2 up to abdomen, but better) present.   Skin:     General: Skin is warm and dry.      Capillary Refill: Capillary refill takes less than 2 seconds.      Findings: No erythema.      Comments:      Neurological:      Mental Status: She is alert and oriented to person, place, and time.         Significant Labs: All pertinent labs within the past 24 hours have been reviewed.    Significant Imaging: I have reviewed all pertinent imaging results/findings within the past 24 hours.  I have reviewed and interpreted all pertinent imaging results/findings within the past 24 hours.      Assessment/Plan:      * Acute on chronic respiratory failure  Known severe COPD on home O2  Hypercapnia but with renal compensation and acceptable pH  Alert and conversational. Also very appropriate  Oxygenation is biggest issue  D-dimer was elevated. Bilateral LE U/S negative for DVT, and suspicion low for PE  Improved with diuresis, weaned off BIPAP to Vapotherm  Vapotherm goal with O2 sats 88-92% and BiPAP nightly  Clinically improving and Vapotherm being weaned down, currently at 15L and 50%  Stable for step down to floor today  Will continue with diuresis as tolerated with diamox  Continue to wean down O2 requirement, goal to get to 4-5L via NC which is her baseline requirement  Will likely need few more days to reach baseline levels for O2 needs  PT/OT consulted and  following for discharge planning  Plan for [facility] NH placement in [place] (patient aware and in agreement)    Venous stasis ulcers, right lower extremity  Wound care daily    Debility  PT and OT    DNR (do not resuscitate) discussion  Dr. Khan spent > 16 min discussing code status with patient.  Patient states she has had discussions with several providers regarding respiratory failure.    She states she has been explained by all of these providers that if ever intubated, she would be very difficult to wean off  ventilator.  Patient states that she is very aware of how bad her COPD is and is accepting of her condition.  States that she would not undergo any measures that would prolong suffering or cause harm.    This includes chest compressions, intubation, shock and drug protocol.    Changed code status to DNR.  Patient's son, Alli, is aware.  Consulted palliative care for ongoing discussion, and support   Will need completed LaPOST done prior to discharge    Chronic respiratory failure with hypoxia, on home O2 therapy  As discussed above  Baseline O2 requirement of 4-5L via NC    Other persistent atrial fibrillation  Rate controlled on oral diltiazem  Patient not interested in resuming anticoagulation as she has experienced epistaxis and rectal bleed (not severe; 2/2 hemorrhoids).   Aware of high risk for stroke and accepts this risk  Continue aspirin daily  Cardiac monitoring.  Stable.    Type 2 diabetes mellitus without complication, without long-term current use of insulin  Hemoglobin A1C   Date Value Ref Range Status   06/26/2020 6.1 (H) 4.0 - 5.6 % Final   Well controlled and currently at targets  Holding metformin  Insulin regimen with SSI only   Will make further adjustments as needed for goal glucose of 140-180    Hypertension, essential  Blood pressure was high on arrival when patient was in respiratory distress  Address respiratory issues and resumed diltiazem  Blood pressure well controlled    COPD (chronic obstructive pulmonary disease)  Classified as severe by her pulmonologist  Thophylline not used in our facility  Doing nebulized treatments. Steroids stopped    VTE Risk Mitigation (From admission, onward)         Ordered     heparin (porcine) injection 5,000 Units  Every 12 hours      06/25/20 1711     IP VTE HIGH RISK PATIENT  Once      06/25/20 1614     Place sequential compression device  Until discontinued      06/25/20 1614                Critical care time spent on the evaluation and treatment of  severe organ dysfunction, review of pertinent labs and imaging studies, discussions with consulting providers and discussions with patient/family: 45 minutes.      Jinny Keita MD  Department of Hospital Medicine   [facility]

## 2020-06-30 NOTE — ASSESSMENT & PLAN NOTE
Blood pressure was high on arrival when patient was in respiratory distress  Address respiratory issues and resumed diltiazem  Blood pressure well controlled

## 2020-06-30 NOTE — CONSULTS
"Consult Note  Palliative Care      Consult Requested By: Jinny Keita MD  Reason for Consult:      Goals of care, advance     SUBJECTIVE:     History of Present Illness:          Chief Complaint   Patient presents with    Leg Swelling       bilateral leg swelling with weeping     Shortness of Breath       Pt initial 70% on 4L with "20feet" of tubing. Pt placed on CPAP currently 91%     HPI   This 70-year-old female presents to the emergency room complaining shortness of breath chest pain and swelling of the legs.  The patient has a history of atrial fibrillation and congestive heart failure as well as chronic obstructive pulmonary disease she has wheezing.  She had oxygen saturations of 70% in the field on arrival.  She was transported on BiPAP.    Principal Problem:Acute on chronic respiratory failure           HPI:  70 year old female with chronic obstructive pulmonary disease, chronic respiratory failure on home O2, obesity, diabetes mellitus type 2 and atrial fibrillation who presents from home with shortness of breath and lower extremity edema.  Patient unable to provide history at this time as she is somnolent and on NIPPV. I called her daughter, Britt, who states she last saw patient 2 days ago and appeared to be at her normal state. States patient lives with her brother Alli. I called Alli. He states patient has not been doing well lately. He adds that she has been "out of it", "falls asleep on her chair", "can't get up", forgetful and an episode of nose bleed. Also mentioned she might be short of breath despite using home O2. He cannot recall all of her meds but will get back to us once he has her list. He is worried about her when on her own as he works. Per chart review, patient was initiated on Xarelto likely for stroke prophylaxis for AFib. Apparently patient stopped taking it Xarelto due to an episode of nose bleed and only taking aspirin.      Per ED staff, EMS reported saturation of 70% on " arrival.  She was placed on CPAP and transported to our emergency department.  On arrival, patient was tachypneic.  Placed on BiPAP and given a dose of IV furosemide.  Patient nods yes to feeling a little better but not ready to come off BiPAP.  Admitted to ICU for further management.     Overview/Hospital Course:  Ms. Mancini presented with acute on chronic respiratory failure due to a combination of progression of her severe COPD and acute on chronic diastolic heart failure.  Patient was placed on a BiPAP, initiated on IV furosemide and admitted to ICU.  Patient improved with IV diuresis and de-escalated to vapotherm cannula and BiPAP q.h.s.  Pulmonology consulted. Diuresis changed to diamox given increased bicarb. Vapotherm being weaned.     Interval History:  Feeling better every day. Vapotherm weaned down to 15L and 50%. Now with pain in legs after getting wound care.    Palliative Care has been consulted to assist with goals of care and advance care planning.      Past Medical History:   Diagnosis Date    Articular gout     Atrial fibrillation     Carotid artery occlusion     CHF (congestive heart failure)     COPD (chronic obstructive pulmonary disease)     Diabetes mellitus, type 2     Hyperlipidemia     Hypertension     Osteopetrosis     Osteoporosis     Sleep apnea      Past Surgical History:   Procedure Laterality Date    CHOLECYSTECTOMY      HYSTERECTOMY      MINERVA equivalent     Family History   Problem Relation Age of Onset    Heart disease Mother     Cancer Father     Cancer Sister         breast     Social History     Tobacco Use    Smoking status: Former Smoker    Smokeless tobacco: Never Used   Substance Use Topics    Alcohol use: No    Drug use: No       Mental Status:  AAO x 4, follows commands, conversant.      Palliative Performance Score:  30-40 (in ICU)       OBJECTIVE:     Pain Assessment: SOB 2/10 at rest on VT 20L/50%. Some fatigue noted with prolonged conversation and uses  "slight purse lipped technique.  States "I am used to this."   Reports 0 pain at present, but has longstanding issues with leg discomfort (will do more indepth assessment after transfer to regular room).  She wears home O2 and uses a walker - says "I've been on my feet all my life and I just try to keep on going" - she has become accustomed to "pushing through".  See MAR for medication regimen.      Decision-Making Capacity:  Patient is fully conversant, understands her illnesses, and makes her own decisions.      Advanced Directives:  Living Will:  NO.  We will get LaPOST prior to discharge.    Do Not Resuscitate Status:  DNR per patient wishes.    Medical Power of :  NO.  GAVE VERBAL (will complete document prior to discharge) - names #1 Rajani Wilkins who is her daughter-in-law ( to son Uziel) , #2 daughter Britt Albright and #3 son Alli Wilkins.        Living Arrangements:  She was living with son Uziel.      Psychosocial, Spiritual, Cultural:      To be assessed javed next visit    Patient's most important priorities:      Patient's biggest concerns/fears:      Previous death/end of life care history:      Patient's goals/hopes:      ASSESSMENT/PLAN:     Brief bedside visit.  Introduced myself and explained Palliative Medicine role in her care, provided brochure.  She is lying in bed, appears older than stated age and looks chronically ill.  She is resting comfortably on Vapothern 20L/50% with sat 95% and in no acute distress, though she does show signs of fatigue and increased SOB with prolonged conversation.      Advance Care Planning     [facility]  Patient able to discuss her main medical conditions - COPD/CHF/afib/DM.  She states she plans to go to a nursing home in [place] (because [place] likes [place] living") and "I want more nursing care".  We will continue more indepth goals of care and options available to her at our next meeting.  She was able to talk about some family dynamics and her living " "situation.  She voices a lot of concerns about her earthly affairs - insurance policies, and the like.       Code Status  She reaffirms she is DNR - she does NOT want cpr/resuscitation if her heart or breathing stops.  She wants a natural and peaceful passing when the time comes.  We will complete a LaPOST prior to discharge and once final goals of care have been established.    Ample time was allowed for patient to speak freely about her hospital stay and home issues and some plans going forward.  We will discuss details of GOC tomorrow. Explained we can include her children by telephone if desired. She is getting ready to transfer to the floor and wants to eat supper, says "I am always hungry!"        Plan:  Educate.  Literature.  Goals of care and code status discussion.  Support.  Consult .    Recommendations:   Continue current treatment plan/supportive care.   DNR reaffirmed.  We will complete LaPOST prior to discharge.   HCPOA's discussed and named (see above; documents will be completed tomorrow).     Patient agrees to NH placement ([facility] preference)   Palliative Care will continue to follow.    Thank you for this consult and the opportunity to participate in Ms. Mancini' care.      Ninoska Glover, ALECIAN, RN, CCRN, CHPN   Palliative Care Nurse Coordinator   [facility]  [phone]      Time Spent:  5 minute bedside assessment, 20 minute discussion of illness, advance care planning, 20 minutes record review and charting.      "

## 2020-06-30 NOTE — ASSESSMENT & PLAN NOTE
Rate controlled on oral diltiazem  Patient not interested in resuming anticoagulation as she has experienced epistaxis and rectal bleed (not severe; 2/2 hemorrhoids).   Aware of high risk for stroke and accepts this risk  Continue aspirin daily  Cardiac monitoring.  Stable.

## 2020-06-30 NOTE — CONSULTS
"Patient with legs up in bed. Dressing applied yesterday saturated with serosangineous fluid. Complained of pain at site of wound.  Assessment:  Photodocumentation    Right anterior lower leg- Broken blister 6 cm(L) 6 cm(W) with granular base. Draining a large amount serosangineous fluid without odor. No surrounding erythema/induration. Pitting edema lower leg.  Treatment:  Cleansed wound with Vashe. Applied 3M Cavilon No Sting Film Barrier to wound edges. Covered wound with Hydrofera Blue Ready. Wrapped with Kerlix roll and 4" Coban.   Instructed patient not to keep leg in dependent position > 1 hour intervals. May sit on side of bed for no longer than 1 hour then elevate leg. Discussed with nursing.   To monitor amount of drainage from wound and determine frequency of dressing changes to contain drainage.   Treatment plan discussed with nursing.   "

## 2020-06-30 NOTE — PROGRESS NOTES
AIME contacted [facility] at [phone] to complete the LOCET, AIME faxed PASRR to [phone]. Awaiting 142.

## 2020-06-30 NOTE — NURSING
1830  P atient showing signs of being short of breath and feeling of doom. Tip of patient's nose and lips bluish in color and o2 sats at 85%. Vaportherm turned back up to 20 liters and sats now greater than 88%.      1845 Awaiting transport. Report given to on coming nurse.

## 2020-06-30 NOTE — ASSESSMENT & PLAN NOTE
Classified as severe by her pulmonologist  Thophylline not used in our facility  Doing nebulized treatments. Steroids stopped

## 2020-06-30 NOTE — PT/OT/SLP PROGRESS
Occupational Therapy   Treatment    Name: Johanna Mancini  MRN: [id number]  Admitting Diagnosis:  Acute on chronic respiratory failure       Recommendations:     Discharge Recommendations: nursing facility, skilled  Discharge Equipment Recommendations:  other (see comments)(TBD)  Barriers to discharge:  Decreased caregiver support    Assessment:     Johanna Mancini is a 70 y.o. female with a medical diagnosis of Acute on chronic respiratory failure.  She presents with decreased independence for self-care and functional transfers. Performance deficits affecting function are weakness, impaired endurance, impaired self care skills, impaired functional mobilty, impaired balance, decreased upper extremity function, decreased lower extremity function, decreased safety awareness, pain, impaired skin, impaired joint extensibility, orthopedic precautions.     Rehab Prognosis:  Good; patient would benefit from acute skilled OT services to address these deficits and reach maximum level of function.       Plan:     Patient to be seen (3-5x/week) to address the above listed problems via self-care/home management, therapeutic exercises, therapeutic activities  · Plan of Care Expires: 07/21/20  · Plan of Care Reviewed with: patient    Subjective     Pain/Comfort:  · Pain Rating 1: (unable to rate)  · Location - Side 1: Bilateral  · Location - Orientation 1: lower  · Location 1: leg  · Pain Addressed 1: Reposition, Distraction, Cessation of Activity    Objective:     Communicated with: nurse Cochran prior to session.  Patient found supine with blood pressure cuff, pulse ox (continuous), oxygen, bed alarm, rizo catheter upon OT entry to room.    General Precautions: Standard, fall, respiratory   Orthopedic Precautions:N/A   Braces: N/A     Occupational Performance:     Bed Mobility:    · Patient completed Rolling/Turning to Left with  contact guard assistance  · Patient completed Rolling/Turning to Right with contact guard  assistance  · Patient completed Scooting/Bridging with stand by assistance and minimum assistance  · Patient completed Supine to Sit with minimum assistance  · Patient completed Sit to Supine with moderate assistance     Functional Mobility/Transfers:  · Patient completed Sit <> Stand Transfer with moderate assistance  with  rolling walker   · Functional Mobility: unable to maintain stand or take steps    Activities of Daily Living:  · Feeding:  modified independence    · Grooming: minimum assistance    · Upper Body Dressing: moderate assistance        AMPAC 6 Click ADL: 15    Treatment & Education:  Therapeutic activity    Patient left supine with all lines intact and call button in reachEducation:      GOALS:   Multidisciplinary Problems     Occupational Therapy Goals        Problem: Occupational Therapy Goal    Goal Priority Disciplines Outcome Interventions   Occupational Therapy Goal     OT, PT/OT Ongoing, Progressing    Description: Goals to be met by: 7/14/2020    Patient will increase functional independence with ADLs by performing:    UE Dressing with Modified [place].  LE Dressing with Modified [place].  Grooming while seated with Modified [place].  Toileting from bedside commode with Modified [place] for hygiene and clothing management.   Toilet transfer to bedside commode with Modified [place].  Upper extremity exercise program x 8 reps per handout, with independence, using ECt tech as neded to minimize SOb and maintain O2 >88%..                     Time Tracking:     OT Date of Treatment: 06/30/20  OT Start Time: 1416  OT Stop Time: 1446  OT Total Time (min): 30 min    Billable Minutes:Therapeutic Activity 15 minutes with PTA    SARAH Blas, MS  6/30/2020

## 2020-06-30 NOTE — CARE UPDATE
[facility]  ICU Shift Summary  Date: 6/30/2020      COVID Test: (--)  Isolation: No active isolations     Prehospitalization: Home  Admit Date / LOS : 6/25/2020/ 5 days    Diagnosis: Acute on chronic respiratory failure    Consults:        Active: OT, PT, SW and Wound Care       Needed: N/A     Code Status: DNR   Advanced Directive: <no information>    LDA: Anderson and PIV       Central Lines/Site/Justification:Patient Does Not Have Central Line       Urinary Cath/Order/Justification:Critically Ill in the ICU and requiring intensive monitoring    Vasopressors/Infusions:        GOALS: Volume/ Hemodynamic: N/A                     RASS: 0  alert and calm    Pain Management: PO, Patch       Pain Controlled: yes     Rhythm: A-Fib    Respiratory Device: Vapotherm and Bipap    Oxygen Concentration (%):  [40-50] 50             Most Recent SBT/ SAT: N/A      VTE Prophylaxis: Pharm  Mobility: Ambulatory, OOB to Chair and A: Assist  Stress Ulcer Prophylaxis: Yes    Dietary: PO  Tolerance: yes  /  Advancement: @ goal    I & O (24h):    Intake/Output Summary (Last 24 hours) at 6/30/2020 0610  Last data filed at 6/30/2020 0600  Gross per 24 hour   Intake 470 ml   Output 2765 ml   Net -2295 ml        Restraints: No    Significant Dates:  Post Op Date: N/A  Rescue Date: N/A  Imaging/ Diagnostics: N/A    Noteworthy Labs: CO2 43    CBC/Anemia Labs: Coags:    Recent Labs   Lab 06/25/20  1351   WBC 6.89   HGB 10.5*   HCT 38.6      MCV 88   RDW 19.4*    Recent Labs   Lab 06/25/20  1351   INR 1.1   APTT 24.5        Chemistries:   Recent Labs   Lab 06/25/20  1351  06/29/20  0356 06/30/20  0429      < > 139 139   K 3.8   < > 4.2 3.6   CL 91*   < > 90* 92*   CO2 43*   < > 42* 43*   BUN 21   < > 24* 20   CREATININE 0.8   < > 0.7 0.7   CALCIUM 9.2   < > 8.6* 8.7   PROT 7.4   < > 6.3 6.0   BILITOT 0.6   < > 0.4 0.4   ALKPHOS 142*   < > 100 100   ALT 33   < > 20 19   AST 35   < > 26 18   MG 1.6  --   --   --     <  > = values in this interval not displayed.        Cardiac Enzymes: Ejection Fractions:    No results for input(s): CPK, CPKMB, MB, TROPONINI in the last 72 hours. Nuc Stress EF   Date Value Ref Range Status   10/15/2018 73 % Final        POCT Glucose: HbA1c:    Recent Labs   Lab 06/29/20  1129 06/29/20  1648 06/29/20 2014   POCTGLUCOSE 92 144* 178*    Hemoglobin A1C   Date Value Ref Range Status   06/26/2020 6.1 (H) 4.0 - 5.6 % Final     Comment:     ADA Screening Guidelines:  5.7-6.4%  Consistent with prediabetes  >or=6.5%  Consistent with diabetes  High levels of fetal hemoglobin interfere with the HbA1C  assay. Heterozygous hemoglobin variants (HbS, HgC, etc)do  not significantly interfere with this assay.   However, presence of multiple variants may affect accuracy.             ICU LOS 4d 12h  Level of Care: OK to Transfer    Shift Summary/Plan for the shift:  Pt slept well Bipap worn for 7+ hours, transitioned to Vapotherm at 0530 per patient request. Urinary output greater than 1L. Daily weights trending down. VSS. No complaints of pain overnight.  Dressing to RLE remains intact. Safety measures maintained.

## 2020-06-30 NOTE — ASSESSMENT & PLAN NOTE
Known severe COPD on home O2  Hypercapnia but with renal compensation and acceptable pH  Alert and conversational. Also very appropriate  Oxygenation is biggest issue  D-dimer was elevated. Bilateral LE U/S negative for DVT, and suspicion low for PE  Improved with diuresis, weaned off BIPAP to Vapotherm  Vapotherm goal with O2 sats 88-92% and BiPAP nightly  Clinically improving and Vapotherm being weaned down, currently at 15L and 50%  Stable for step down to floor today  Will continue with diuresis as tolerated with diamox  Continue to wean down O2 requirement, goal to get to 4-5L via NC which is her baseline requirement  Will likely need few more days to reach baseline levels for O2 needs  PT/OT consulted and  following for discharge planning  Plan for [facility] NH placement in [place] (patient aware and in agreement)

## 2020-06-30 NOTE — PLAN OF CARE
Patient awakens easily after wearing bipap all night. Vital signs have been stable and patient tolerating slow wean of the vaportherm. Palliative care was consulted for future planning regarding end of life wishes. Patient voicing concerns regarding burial insurance and  consulted by palliative care for discussion. Wound care came today to redress wound and leave wound care orders.PT and OT worked with patient today . No falls or injuies this shift. No further skin breakdown noted. Plan of care reviewed with patient at bedside.

## 2020-06-30 NOTE — NURSING TRANSFER
Nursing Transfer Note      6/30/2020     Transfer To: Room 405    Transfer via bed    Transfer with  O2, cardiac monitoring    Transported by Nurse and transport    Medicines sent: yes  Chart send with patient: Yes    Notified:Family notified by patient    Patient reassessed at: 06/30/20 1600           Upon arrival to floor:  [place] to room

## 2020-06-30 NOTE — ASSESSMENT & PLAN NOTE
Hemoglobin A1C   Date Value Ref Range Status   06/26/2020 6.1 (H) 4.0 - 5.6 % Final   Well controlled and currently at targets  Holding metformin  Insulin regimen with SSI only   Will make further adjustments as needed for goal glucose of 140-180

## 2020-06-30 NOTE — PLAN OF CARE
Problem: Occupational Therapy Goal  Goal: Occupational Therapy Goal  Description: Goals to be met by: 7/14/2020    Patient will increase functional independence with ADLs by performing:    UE Dressing with Modified [place].  LE Dressing with Modified [place].  Grooming while seated with Modified [place].  Toileting from bedside commode with Modified [place] for hygiene and clothing management.   Toilet transfer to bedside commode with Modified [place].  Upper extremity exercise program x 8 reps per handout, with independence, using ECt tech as neded to minimize SOb and maintain O2 >88%..    Outcome: Ongoing, Progressing    Patient tolerated treatment well, performed well, decreased endurance noted, may benefit from continued therapy following discharge from acute care. SARAH Blas, MS

## 2020-06-30 NOTE — PT/OT/SLP PROGRESS
Physical Therapy Treatment    Patient Name:  Johanna Mancini   MRN:  [id number]    Recommendations:     Discharge Recommendations:  nursing facility, basic   Discharge Equipment Recommendations: wheelchair, bedside commode(bariatric w/c, bariatric BSC)   Barriers to discharge: Decreased caregiver support    Assessment:     Johanna Mancini is a 70 y.o. female admitted with a medical diagnosis of Acute on chronic respiratory failure.  She presents with the following impairments/functional limitations:  weakness, impaired endurance, impaired self care skills, impaired functional mobilty, gait instability, impaired balance, decreased upper extremity function, decreased lower extremity function, decreased safety awareness, pain, decreased ROM, impaired skin, edema, impaired cardiopulmonary response to activity .    Rehab Prognosis: Fair; patient would benefit from acute skilled PT services to address these deficits and reach maximum level of function.    Recent Surgery: * No surgery found *      Plan:     During this hospitalization, patient to be seen 6 x/week to address the identified rehab impairments via gait training, therapeutic activities, therapeutic exercises, neuromuscular re-education, wheelchair management/training and progress toward the following goals:    · Plan of Care Expires:  07/12/20    Subjective     Chief Complaint: RLE pain   Patient/Family Comments/goals: pt agreeable to treatment   Pain/Comfort:  · Location - Side 1: Right  · Location 1: foot  · Pain Addressed 1: Pre-medicate for activity, Reposition, Distraction, Cessation of Activity, Nurse notified      Objective:     Communicated with nurse Cochran prior to session.  Patient found HOB elevated with blood pressure cuff, rizo catheter, oxygen (vapotherm , 20L 50%) , pulse ox (continuous), telemetry upon PT entry to room.     General Precautions: Standard, fall, respiratory   Orthopedic Precautions:N/A   Braces: N/A     Functional Mobility:  · Bed  Mobility:     · Rolling Right: contact guard assistance  · Scooting: contact guard assistance to scoot anteriorly , maximal assistance and of   Persons to scoot posteriorly in bed   · Bridging: maximal assistance and of 2 persons with bed rails and trendelenburg position.   · Supine to Sit: minimum assistance, HOB elevated, bedside rail   · Sit to Supine: maximal assistance and of 2 persons  · Transfers:     · Sit to Stand:  minimum assistance and of 2 persons with rolling walker. V/T cues for safety technique and walker management.   · Gait:  Unable to perform 2* RLE pain .   · Balance: Fair + in sitting, poor in standing.        AM-PAC 6 CLICK MOBILITY  Turning over in bed (including adjusting bedclothes, sheets and blankets)?: 3  Sitting down on and standing up from a chair with arms (e.g., wheelchair, bedside commode, etc.): 2  Moving from lying on back to sitting on the side of the bed?: 2  Moving to and from a bed to a chair (including a wheelchair)?: 2  Need to walk in hospital room?: 2  Climbing 3-5 steps with a railing?: 1  Basic Mobility Total Score: 12       Therapeutic Activities and Exercises:   Pt performed seated BLE 10 reps :   AP, LAQ, Hip abd/add and supine BLE x 10 reps : AP . V/T's cues for technique and sequence.   SpO2 decreased to 86% on exertions despite on vapotherm 20 L 50%, however able to recover to 92-93% with rest breaks and pursed lip breathing technique   Educated pt on safety awareness with all OOB mobility , transfer and gait training.     Patient left with bed in chair position with BUE/BLE elevated on pillow  all lines intact, call button in reach and nurse notified..    GOALS:   Multidisciplinary Problems     Physical Therapy Goals        Problem: Physical Therapy Goal    Goal Priority Disciplines Outcome Goal Variances Interventions   Physical Therapy Goal     PT, PT/OT Ongoing, Progressing     Description: Goals to be met by: 7/12/20     Patient will increase functional  independence with mobility by performin. Supine to sit with Stand-by Assistance  2. Sit to supine with Stand-by Assistance  3. Rolling to Left and Right with Modified [place].  4. Sit to stand transfer with Supervision and RW  5. Gait  x 50 feet with Contact Guard Assistance using Rolling Walker.   6. Wheelchair propulsion x200 feet with Supervision using bilateral uppper extremities                     Time Tracking:     PT Received On: 20  PT Start Time: 1416     PT Stop Time: 1448  PT Total Time (min): 32 min     Billable Minutes: Therapeutic Exercise 16 and Total Time 32 min co-treat with OT    Treatment Type: Treatment  PT/PTA: PTA     PTA Visit Number: 1     Tram T Loraine, PTA  2020

## 2020-07-01 NOTE — NURSING
I rounded on pt at approx 1245. Pt was unresponsive to tactile or verbal stimuli.. Immediately notified Dory Kathy, charge nurse and notified Dr Miguel Mahajan of pts status. Pt's code status is DNR. Will perform care as needed.

## 2020-07-01 NOTE — PT/OT/SLP DISCHARGE
Occupational Therapy Discharge Summary    Johanna Mancini  MRN: [id number]   Principal Problem: Acute on chronic respiratory failure      Patient Discharged from acute Occupational Therapy on 2020.  Please refer to prior OT note dated 2020 for functional status.    Assessment:      Patient is     Objective:     GOALS:   Multidisciplinary Problems     Occupational Therapy Goals        Problem: Occupational Therapy Goal    Goal Priority Disciplines Outcome Interventions   Occupational Therapy Goal     OT, PT/OT Ongoing, Progressing    Description: Goals to be met by: 2020    Patient will increase functional independence with ADLs by performing:    UE Dressing with Modified [place].  LE Dressing with Modified [place].  Grooming while seated with Modified [place].  Toileting from bedside commode with Modified [place] for hygiene and clothing management.   Toilet transfer to bedside commode with Modified [place].  Upper extremity exercise program x 8 reps per handout, with independence, using ECt tech as neded to minimize SOb and maintain O2 >88%..                     Reasons for Discontinuation of Therapy Services  Patient is       Plan:     Patient Discharged to:     SARAH Blas, MS  2020

## 2020-07-01 NOTE — SIGNIFICANT EVENT
DEATH DECLARATION     I received a pager beep around 1:10 AM 2020 from Kevin Dorsey.   The page was responded and he said the patient is unresponsive and needs to be seen by a physician. The patient was then seen and examined at the bed side in the presence of charge nurse and the nurse Mr. Brown.     The patient is unresponsive, not responding to verbal or tactile stimuli.   Corneal, light, and gag reflexes are negative.   The skin is mottles on the distal lower extremities and upper extremities and on chest.   Heart is without heart sound on auscultation   Chest wall movements absent  Breath sounds absent on auscultation   Neruo: No brain stem reflexes.     Assessment. The patient has . The time of announcement is 1:25 AM, 2020

## 2020-07-16 NOTE — DISCHARGE SUMMARY
"[facility] Hospital Medicine  Discharge Summary      Patient Name: Johanna Mancini  MRN: [id number]  Admission Date: 6/25/2020  Hospital Length of Stay: 6 days  Discharge Date and Time: 7/1/2020  2:00 AM  Attending Physician: No att. providers found   Discharging Provider: Jinny Keita MD  Primary Care Provider: Johnson Mcintosh MD      HPI:   70 year old female with chronic obstructive pulmonary disease, chronic respiratory failure on home O2, obesity, diabetes mellitus type 2 and atrial fibrillation who presents from home with shortness of breath and lower extremity edema.  Patient unable to provide history at this time as she is somnolent and on NIPPV. I called her daughter, Britt, who states she last saw patient 2 days ago and appeared to be at her normal state. States patient lives with her brother Alli. I called Alli. He states patient has not been doing well lately. He adds that she has been "out of it", "falls asleep on her chair", "can't get up", forgetful and an episode of nose bleed. Also mentioned she might be short of breath despite using home O2. He cannot recall all of her meds but will get back to us once he has her list. He is worried about her when on her own as he works. Per chart review, patient was initiated on Xarelto likely for stroke prophylaxis for AFib. Apparently patient stopped taking it Xarelto due to an episode of nose bleed and only taking aspirin.     Per ED staff, EMS reported saturation of 70% on arrival.  She was placed on CPAP and transported to our emergency department.  On arrival, patient was tachypneic.  Placed on BiPAP and given a dose of IV furosemide.  Patient nods yes to feeling a little better but not ready to come off BiPAP.  Admitted to ICU for further management.    * No surgery found *      Hospital Course:   Ms. Mancini presented with acute on chronic respiratory failure due to a combination of progression of her severe COPD and acute on chronic " diastolic heart failure.  Patient was placed on a BiPAP, initiated on IV furosemide and admitted to ICU.  Patient improved with IV diuresis and de-escalated to vapotherm cannula and BiPAP q.h.s.  Pulmonology followed. Diuresis changed to diamox given increased bicarb. Vapotherm being weaned.  Since her D-dimer was elevated, she underwent evaluation with ultrasound lower extremity which was negative for DVT.  Suspicion was low for PE.  Patient steadily improved she was weaned to 15 L and 50% FiO2 Vapotherm and was stable for step down to the floor.  Patient had extensive conversation throughout hospitalization and requested DNR status.  She had good insight into her medical disease with end-stage COPD.  After patient was transfer the floor, receiving nurse noted she was stable.  Later that evening, she was found unresponsive, asystolic, apneic with pupil fixed and dilated.  Time of death was 1:25 AM, on July 1, 2020.     Consults:   Consults (From admission, onward)        Status Ordering Provider     Inpatient consult to Palliative Care  Once     Provider:  Tarah Helton NP    Completed CATHY FLORES     Inpatient consult to Pulmonology  Once     Provider:  Marco Antonio Michael MD    Completed DREW BOWEN      .  Service: Hospital Medicine    Final Active Diagnoses:    Diagnosis Date Noted POA    PRINCIPAL PROBLEM:  Acute on chronic respiratory failure [J96.20] 06/25/2020 Yes    Debility [R53.81] 06/30/2020 Yes    Venous stasis ulcers, right lower extremity [I83.009, L97.909] 06/30/2020 Yes    DNR (do not resuscitate) discussion [Z71.89] 06/27/2020 Not Applicable    Chronic respiratory failure with hypoxia, on home O2 therapy [J96.11, Z99.81] 06/25/2020 Not Applicable    Other persistent atrial fibrillation [I48.19] 01/20/2020 Yes    Hypertension, essential [I10] 06/05/2017 Yes    Type 2 diabetes mellitus without complication, without long-term current use of insulin [E11.9] 06/05/2017 Yes     COPD (chronic obstructive pulmonary disease) [J44.9] 2014 Yes      Problems Resolved During this Admission:       Discharged Condition:     Disposition:     Follow Up: N/A    Patient Instructions:   No discharge procedures on file.    Significant Diagnostic Studies:    Pending Diagnostic Studies:     None         Medications:  None (patient  at medical facility)    Indwelling Lines/Drains at time of discharge:   Lines/Drains/Airways     Drain                 Urethral Catheter 20 21 days                Time spent on the discharge of patient:  < 30 minutes       Jinny Keita MD  Department of Hospital Medicine  [facility]

## 2020-07-28 ENCOUNTER — TELEPHONE (OUTPATIENT)
Dept: FAMILY MEDICINE | Facility: CLINIC | Age: 70
End: 2020-07-28

## 2020-07-28 NOTE — TELEPHONE ENCOUNTER
Jess from [facility] is calling to see if PCP can sign death certificate? It is in [place]

## 2020-07-28 NOTE — TELEPHONE ENCOUNTER
That has to be signed by hospitalist as she passed away in the hospital and I have not seen her in several months    Thanks  Dr. Mcintosh

## 2022-09-22 NOTE — TELEPHONE ENCOUNTER
----- Message from Rajani Leary sent at 2/1/2019  7:03 AM CST -----  Contact: self  [phone]  PINK DOT !!! PINK DOT    Pt has a bad cough, body aches, running nose,chest congestion  She is requesting a script. [facility] call ...    [employer] [address] [place] [address] [place] [address] AT [address]  [place] [address]  Phone: [phone] Fax: [phone]    Thanks........Trinity    
Attempted to reach patient. LMOVM     PATIENT NEEDS TO COME IN FOR APPT. DR. TRUJILLO DOES NOT CALL IN MEDS WITHOUT BEING SEEN.   
PROVIDER:[TOKEN:[[id number]]]
